# Patient Record
Sex: FEMALE | Race: WHITE | Employment: FULL TIME | URBAN - METROPOLITAN AREA
[De-identification: names, ages, dates, MRNs, and addresses within clinical notes are randomized per-mention and may not be internally consistent; named-entity substitution may affect disease eponyms.]

---

## 2019-12-16 LAB
ANTIBODY SCREEN, EXTERNAL: NEGATIVE
CHLAMYDIA, EXTERNAL: NEGATIVE
HBSAG, EXTERNAL: NEGATIVE
HIV, EXTERNAL: NEGATIVE
N. GONORRHEA, EXTERNAL: NEGATIVE
RPR, EXTERNAL: NORMAL
RUBELLA, EXTERNAL: NORMAL
TYPE, ABO & RH, EXTERNAL: NORMAL

## 2020-06-29 LAB — GRBS, EXTERNAL: NEGATIVE

## 2020-07-07 ENCOUNTER — HOSPITAL ENCOUNTER (OUTPATIENT)
Age: 25
Discharge: HOME | End: 2020-07-07
Attending: OBSTETRICS & GYNECOLOGY | Admitting: OBSTETRICS & GYNECOLOGY
Payer: COMMERCIAL

## 2020-07-07 VITALS
WEIGHT: 203 LBS | TEMPERATURE: 97.6 F | DIASTOLIC BLOOD PRESSURE: 68 MMHG | RESPIRATION RATE: 16 BRPM | HEIGHT: 67 IN | HEART RATE: 73 BPM | SYSTOLIC BLOOD PRESSURE: 114 MMHG | BODY MASS INDEX: 31.86 KG/M2

## 2020-07-07 PROBLEM — Z33.1 IUP (INTRAUTERINE PREGNANCY), INCIDENTAL: Status: ACTIVE | Noted: 2020-07-07
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
## 2020-07-07 PROBLEM — Z3A.37 37 WEEKS GESTATION OF PREGNANCY: Status: ACTIVE | Noted: 2020-07-07

## 2020-07-07 PROCEDURE — 99282 EMERGENCY DEPT VISIT SF MDM: CPT

## 2020-07-07 PROCEDURE — 74011250637 HC RX REV CODE- 250/637: Performed by: ADVANCED PRACTICE MIDWIFE

## 2020-07-07 PROCEDURE — 59025 FETAL NON-STRESS TEST: CPT

## 2020-07-07 RX ORDER — ACETAMINOPHEN 500 MG
1000 TABLET ORAL ONCE
Status: COMPLETED | OUTPATIENT
Start: 2020-07-07 | End: 2020-07-07

## 2020-07-07 RX ORDER — FAMOTIDINE 20 MG/1
20 TABLET, FILM COATED ORAL 2 TIMES DAILY
COMMUNITY
End: 2020-07-17

## 2020-07-07 RX ORDER — CALCIUM CARBONATE/VITAMIN D3 600 MG-125
TABLET ORAL
COMMUNITY
End: 2021-05-11 | Stop reason: HOSPADM

## 2020-07-07 RX ORDER — GLUCOSAMINE SULFATE 1500 MG
POWDER IN PACKET (EA) ORAL DAILY
COMMUNITY
End: 2021-05-11 | Stop reason: HOSPADM

## 2020-07-07 RX ORDER — ONDANSETRON 4 MG/1
4 TABLET, FILM COATED ORAL
COMMUNITY
End: 2020-07-17

## 2020-07-07 RX ADMIN — ACETAMINOPHEN 1000 MG: 500 TABLET ORAL at 16:08

## 2020-07-07 NOTE — PROGRESS NOTES
presents at 37. 3 weeks gestation due to a fall that occurred at 1400 20 while entering her home. EFM applied. Hx confirmed. Pt oriented to room.

## 2020-07-07 NOTE — PROGRESS NOTES
Discharge instructions given. Pt gives verbal teach back of all instructions. Pt denies further needs at this time. Pt ambulated off the unit at this time in stable condition undelivered to follow up as scheduled.

## 2020-07-07 NOTE — PROGRESS NOTES
FHR tracing for the past 3 hours reviewed with REHAN Naranjo CNM. Verbal oreders received for discharge at this time. No further orders received at this time.

## 2020-07-07 NOTE — PROGRESS NOTES
Kimmy Corona CNM on the phone and updated regarding pt's arrival and fall. Telephone orders received for 1000mg PO 1 time dose of Tylenol and continuous EFM until 1800 today. No further orders received at this time.

## 2020-07-13 ENCOUNTER — HOSPITAL ENCOUNTER (OUTPATIENT)
Age: 25
Discharge: HOME | End: 2020-07-13
Attending: OBSTETRICS & GYNECOLOGY | Admitting: OBSTETRICS & GYNECOLOGY
Payer: COMMERCIAL

## 2020-07-13 VITALS — HEIGHT: 67 IN | BODY MASS INDEX: 45.99 KG/M2 | WEIGHT: 293 LBS

## 2020-07-13 LAB
APPEARANCE UR: CLEAR
BILIRUB UR QL: NEGATIVE
COLOR UR: ABNORMAL
GLUCOSE UR QL STRIP.AUTO: NEGATIVE MG/DL
KETONES UR-MCNC: NEGATIVE MG/DL
LEUKOCYTE ESTERASE UR QL STRIP: NEGATIVE
NITRITE UR QL: NEGATIVE
PH UR: 7 [PH] (ref 5–9)
PROT UR QL: NEGATIVE MG/DL
RBC # UR STRIP: ABNORMAL /UL
SERVICE CMNT-IMP: ABNORMAL
SP GR UR: 1.02 (ref 1–1.02)
UROBILINOGEN UR QL: 0.2 EU/DL (ref 0.2–1)

## 2020-07-13 PROCEDURE — 81003 URINALYSIS AUTO W/O SCOPE: CPT

## 2020-07-13 PROCEDURE — 59025 FETAL NON-STRESS TEST: CPT

## 2020-07-13 PROCEDURE — 99283 EMERGENCY DEPT VISIT LOW MDM: CPT

## 2020-07-13 RX ORDER — CALCIUM CARBONATE 200(500)MG
1 TABLET,CHEWABLE ORAL DAILY
COMMUNITY
End: 2021-05-11 | Stop reason: HOSPADM

## 2020-07-13 RX ORDER — ACETAMINOPHEN 500 MG
1000 TABLET ORAL
COMMUNITY

## 2020-07-14 NOTE — PROGRESS NOTES
HPI:    Subjective:     Tyler Gonzales, a  30 y.o.   at 38w2d presents to L&D with c/o Uterine contractions: seen in office today with v/e 2 cm, ctx every 2-3 minutes since office visit. Denies VB, LOF, reports good FM    Assessment:  Past Medical History:   Diagnosis Date    Heart abnormality     Heart ablasion     Past Surgical History:   Procedure Laterality Date    HX OTHER SURGICAL      cyst removal L eye     HX SVT ABLATION N/A 2019    Cardiac Ablation due to PVC's. Allergies   Allergen Reactions    Amitriptyline Swelling     Prior to Admission medications    Medication Sig Start Date End Date Taking? Authorizing Provider   calcium carbonate (TUMS) 200 mg calcium (500 mg) chew Take 1 Tab by mouth daily. Yes Provider, Historical   acetaminophen (Tylenol Extra Strength) 500 mg tablet Take 1,000 mg by mouth every six (6) hours as needed for Pain. Indications: backache   Yes Provider, Historical   PNV No12-Iron-FA-DSS-OM-3 29 mg iron-1 mg -50 mg CPKD Take  by mouth. Yes Provider, Historical   calcium-cholecalciferol, d3, (CALCIUM 600 + D) 600-125 mg-unit tab Take  by mouth. Yes Provider, Historical   cholecalciferol (Vitamin D3) 25 mcg (1,000 unit) cap Take  by mouth daily. Yes Provider, Historical   ondansetron hcl (Zofran) 4 mg tablet Take 4 mg by mouth every eight (8) hours as needed for Nausea or Vomiting. Yes Provider, Historical   famotidine (Pepcid) 20 mg tablet Take 20 mg by mouth two (2) times a day. Yes Provider, Historical        Objective:     Vitals:  Vitals:    20   Weight: (!) 199 kg (438 lb 11.5 oz)   Height: 5' 7\" (1.702 m)        Physical Exam:  Patient without distress.   Abdomen: soft, nontender  Fundus: soft and non tender  Perineum: blood absent, amniotic fluid absent  Cervical Exam: 3 cm dilated    80% effaced    -3 station    Presenting Part: cephalic  Cervical Position: mid position  Consistency: Soft  Membranes:  Intact  Fetal Heart Rate: Reactive  Baseline: 145 per minute  Variability: moderate  Accelerations: yes  Decelerations: none  Uterine contractions: regular, every 1.5-3 minutes    U/A: Urine dipstick shows positive for red blood cells, S.G. 1.020. Assessment/Plan: no e/o active labor, reassuring fetal status     Plan: DC home with standard & ER labor precautions. Follow-up in office for routine visit.      Signed By:  Lenny Covington CNM     July 13, 2020

## 2020-07-14 NOTE — PROGRESS NOTES
2000 Received to L & D unit with c/o ctx every 3-4 minutes and spotting since appointment in office today. 38+2 G1 [facility] patient. 2040 BEBA Hadley informed of arrival, SVE and UA results. Orders received to  ambulate and recheck cervix in about 1 hour. 2045 Up to ambulate in triage. Birthing ball given. 2147 Placed back in EF states pain level has increased to 3-4/10.     2255 No change in SVE. Desires to be DC'd to home since plan is for a un medicated birth. Orders from BEBA Milligan Received. 2210  Written and verbal DC instructions given to include daily fetal kick counts, labor precautions. Verbalizes understanding. All questions answered. Up to get dressed. 26 DC'd to home ambulatory with .

## 2020-07-14 NOTE — DISCHARGE INSTRUCTIONS
Patient Education        Michelle Guerreros Contractions: Care Instructions  Your Care Instructions     Zhang Maddox contractions prepare your uterus for labor. Think of them as a \"warm-up\" exercise that your body does. You may begin to feel them between the 28th and 30th weeks of your pregnancy. But they start as early as the 20th week. Elma Maddox contractions usually occur more often during the ninth month. They may go away when you are active and return when you rest. These contractions are like mild contractions of true labor, but they occur less often. (You feel fewer than 8 in an hour.) They don't cause your cervix to open. It may be hard for you to tell the difference between Michelle More contractions and true labor, especially in your first pregnancy. Follow-up care is a key part of your treatment and safety. Be sure to make and go to all appointments, and call your doctor if you are having problems. It's also a good idea to know your test results and keep a list of the medicines you take. How can you care for yourself at home? · Try a warm bath to help relieve muscle tension and reduce pain. · Change positions every 30 minutes. Take breaks if you must sit for a long time. Get up and walk around. · Drink plenty of water, enough so that your urine is light yellow or clear like water. · Taking short walks may help you feel better. Your doctor needs to check any contractions that are getting stronger or closer together. Where can you learn more? Go to http://declan-ervin.info/  Enter Z402 in the search box to learn more about \"Elma Maddox Contractions: Care Instructions. \"  Current as of: February 11, 2020               Content Version: 12.5  © 5454-5440 [employer]. Care instructions adapted under license by [employer] (which disclaims liability or warranty for this information).  If you have questions about a medical condition or this instruction, always ask your healthcare professional. [address] any warranty or liability for your use of this information. Counting Your Baby's Kicks: Care Instructions  Your Care Instructions     Counting your baby's kicks is one way your doctor can tell that your baby is healthy. Most women--especially in a first pregnancy--feel their baby move for the first time between 16 and 22 weeks. The movement may feel like flutters rather than kicks. Your baby may move more at certain times of the day. When you are active, you may notice less kicking than when you are resting. At your prenatal visits, your doctor will ask whether the baby is active. In your last trimester, your doctor may ask you to count the number of times you feel your baby move. Follow-up care is a key part of your treatment and safety. Be sure to make and go to all appointments, and call your doctor if you are having problems. It's also a good idea to know your test results and keep a list of the medicines you take. How do you count fetal kicks? · A common method of checking your baby's movement is to count the number of kicks or moves you feel in 1 hour. Ten movements (such as kicks, flutters, or rolls) in 1 hour are normal. Some doctors suggest that you count in the morning until you get to 10 movements. Then you can quit for that day and start again the next day. · Pick your baby's most active time of day to count. This may be any time from morning to evening. · If you do not feel 10 movements in an hour, your baby may be sleeping. Wait for the next hour and count again. When should you call for help? Call your doctor now or seek immediate medical care if:  · You noticed that your baby has stopped moving or is moving much less than normal.  Watch closely for changes in your health, and be sure to contact your doctor if you have any problems. Where can you learn more?   Go to http://declan-ervin.info/  Enter [id number] in the search box to learn more about \"Counting Your Baby's Kicks: Care Instructions. \"  Current as of: February 11, 2020               Content Version: 12.5  © 9373-2512 [employer]. Care instructions adapted under license by [employer] (which disclaims liability or warranty for this information). If you have questions about a medical condition or this instruction, always ask your healthcare professional. [place] [address] any warranty or liability for your use of this information.

## 2020-07-15 ENCOUNTER — ANESTHESIA (OUTPATIENT)
Dept: LABOR AND DELIVERY | Age: 25
End: 2020-07-15
Payer: COMMERCIAL

## 2020-07-15 ENCOUNTER — ANESTHESIA EVENT (OUTPATIENT)
Dept: LABOR AND DELIVERY | Age: 25
End: 2020-07-15
Payer: COMMERCIAL

## 2020-07-15 ENCOUNTER — HOSPITAL ENCOUNTER (INPATIENT)
Age: 25
LOS: 2 days | Discharge: HOME | End: 2020-07-17
Attending: OBSTETRICS & GYNECOLOGY | Admitting: OBSTETRICS & GYNECOLOGY
Payer: COMMERCIAL

## 2020-07-15 DIAGNOSIS — R10.31 RIGHT INGUINAL PAIN: Primary | ICD-10-CM

## 2020-07-15 LAB
ABO + RH BLD: NORMAL
BASOPHILS # BLD: 0 K/UL (ref 0–0.1)
BASOPHILS NFR BLD: 0 % (ref 0–2)
BLOOD GROUP ANTIBODIES SERPL: NORMAL
DIFFERENTIAL METHOD BLD: ABNORMAL
EOSINOPHIL # BLD: 0.1 K/UL (ref 0–0.4)
EOSINOPHIL NFR BLD: 1 % (ref 0–5)
ERYTHROCYTE [DISTWIDTH] IN BLOOD BY AUTOMATED COUNT: 13.1 % (ref 11.6–14.5)
HCT VFR BLD AUTO: 37.3 % (ref 35–45)
HGB BLD-MCNC: 12.3 G/DL (ref 12–16)
LYMPHOCYTES # BLD: 2.4 K/UL (ref 0.9–3.6)
LYMPHOCYTES NFR BLD: 23 % (ref 21–52)
MCH RBC QN AUTO: 31.7 PG (ref 24–34)
MCHC RBC AUTO-ENTMCNC: 33 G/DL (ref 31–37)
MCV RBC AUTO: 96.1 FL (ref 74–97)
MONOCYTES # BLD: 1 K/UL (ref 0.05–1.2)
MONOCYTES NFR BLD: 9 % (ref 3–10)
NEUTS SEG # BLD: 7.1 K/UL (ref 1.8–8)
NEUTS SEG NFR BLD: 67 % (ref 40–73)
PLATELET # BLD AUTO: 146 K/UL (ref 135–420)
PMV BLD AUTO: 11.6 FL (ref 9.2–11.8)
RBC # BLD AUTO: 3.88 M/UL (ref 4.2–5.3)
SPECIMEN EXP DATE BLD: NORMAL
WBC # BLD AUTO: 10.5 K/UL (ref 4.6–13.2)

## 2020-07-15 PROCEDURE — 99281 EMR DPT VST MAYX REQ PHY/QHP: CPT

## 2020-07-15 PROCEDURE — 74011000250 HC RX REV CODE- 250: Performed by: SPECIALIST

## 2020-07-15 PROCEDURE — 0HQ9XZZ REPAIR PERINEUM SKIN, EXTERNAL APPROACH: ICD-10-PCS | Performed by: OBSTETRICS & GYNECOLOGY

## 2020-07-15 PROCEDURE — 4A1HXCZ MONITORING OF PRODUCTS OF CONCEPTION, CARDIAC RATE, EXTERNAL APPROACH: ICD-10-PCS | Performed by: OBSTETRICS & GYNECOLOGY

## 2020-07-15 PROCEDURE — 65270000029 HC RM PRIVATE

## 2020-07-15 PROCEDURE — 74011000250 HC RX REV CODE- 250: Performed by: OBSTETRICS & GYNECOLOGY

## 2020-07-15 PROCEDURE — 74011250637 HC RX REV CODE- 250/637: Performed by: MIDWIFE

## 2020-07-15 PROCEDURE — 75410000002 HC LABOR FEE PER 1 HR

## 2020-07-15 PROCEDURE — 77030007879 HC KT SPN EPDRL TELE -B: Performed by: SPECIALIST

## 2020-07-15 PROCEDURE — 86901 BLOOD TYPING SEROLOGIC RH(D): CPT

## 2020-07-15 PROCEDURE — 85025 COMPLETE CBC W/AUTO DIFF WBC: CPT

## 2020-07-15 PROCEDURE — 76060000078 HC EPIDURAL ANESTHESIA

## 2020-07-15 PROCEDURE — 75410000000 HC DELIVERY VAGINAL/SINGLE

## 2020-07-15 PROCEDURE — 74011250636 HC RX REV CODE- 250/636: Performed by: SPECIALIST

## 2020-07-15 PROCEDURE — 00HU33Z INSERTION OF INFUSION DEVICE INTO SPINAL CANAL, PERCUTANEOUS APPROACH: ICD-10-PCS | Performed by: SPECIALIST

## 2020-07-15 PROCEDURE — 74011250636 HC RX REV CODE- 250/636: Performed by: OBSTETRICS & GYNECOLOGY

## 2020-07-15 PROCEDURE — 75410000003 HC RECOV DEL/VAG/CSECN EA 0.5 HR

## 2020-07-15 PROCEDURE — 74011250637 HC RX REV CODE- 250/637: Performed by: OBSTETRICS & GYNECOLOGY

## 2020-07-15 PROCEDURE — 59025 FETAL NON-STRESS TEST: CPT

## 2020-07-15 PROCEDURE — 77030040830 HC CATH URETH FOL MDII -A

## 2020-07-15 RX ORDER — NALBUPHINE HYDROCHLORIDE 10 MG/ML
2.5 INJECTION, SOLUTION INTRAMUSCULAR; INTRAVENOUS; SUBCUTANEOUS
Status: DISCONTINUED | OUTPATIENT
Start: 2020-07-15 | End: 2020-07-15 | Stop reason: HOSPADM

## 2020-07-15 RX ORDER — NALOXONE HYDROCHLORIDE 0.4 MG/ML
0.2 INJECTION, SOLUTION INTRAMUSCULAR; INTRAVENOUS; SUBCUTANEOUS AS NEEDED
Status: DISCONTINUED | OUTPATIENT
Start: 2020-07-15 | End: 2020-07-15 | Stop reason: HOSPADM

## 2020-07-15 RX ORDER — MINERAL OIL
30 OIL (ML) ORAL AS NEEDED
Status: COMPLETED | OUTPATIENT
Start: 2020-07-15 | End: 2020-07-15

## 2020-07-15 RX ORDER — BUTORPHANOL TARTRATE 2 MG/ML
2 INJECTION INTRAMUSCULAR; INTRAVENOUS
Status: DISCONTINUED | OUTPATIENT
Start: 2020-07-15 | End: 2020-07-15 | Stop reason: HOSPADM

## 2020-07-15 RX ORDER — LIDOCAINE HYDROCHLORIDE 10 MG/ML
INJECTION INFILTRATION; PERINEURAL AS NEEDED
Status: DISCONTINUED | OUTPATIENT
Start: 2020-07-15 | End: 2020-07-15 | Stop reason: HOSPADM

## 2020-07-15 RX ORDER — METHYLERGONOVINE MALEATE 0.2 MG/ML
0.2 INJECTION INTRAVENOUS AS NEEDED
Status: DISCONTINUED | OUTPATIENT
Start: 2020-07-15 | End: 2020-07-15 | Stop reason: HOSPADM

## 2020-07-15 RX ORDER — IBUPROFEN 400 MG/1
800 TABLET ORAL
Status: DISCONTINUED | OUTPATIENT
Start: 2020-07-15 | End: 2020-07-17 | Stop reason: HOSPADM

## 2020-07-15 RX ORDER — FAMOTIDINE 20 MG/1
20 TABLET, FILM COATED ORAL EVERY 12 HOURS
Status: DISCONTINUED | OUTPATIENT
Start: 2020-07-15 | End: 2020-07-17 | Stop reason: HOSPADM

## 2020-07-15 RX ORDER — NALBUPHINE HYDROCHLORIDE 10 MG/ML
10 INJECTION, SOLUTION INTRAMUSCULAR; INTRAVENOUS; SUBCUTANEOUS
Status: DISCONTINUED | OUTPATIENT
Start: 2020-07-15 | End: 2020-07-15 | Stop reason: HOSPADM

## 2020-07-15 RX ORDER — ZOLPIDEM TARTRATE 5 MG/1
5 TABLET ORAL
Status: DISCONTINUED | OUTPATIENT
Start: 2020-07-15 | End: 2020-07-17 | Stop reason: HOSPADM

## 2020-07-15 RX ORDER — FENTANYL CITRATE 50 UG/ML
100 INJECTION, SOLUTION INTRAMUSCULAR; INTRAVENOUS ONCE
Status: DISPENSED | OUTPATIENT
Start: 2020-07-15 | End: 2020-07-15

## 2020-07-15 RX ORDER — OXYCODONE AND ACETAMINOPHEN 5; 325 MG/1; MG/1
2 TABLET ORAL
Status: DISCONTINUED | OUTPATIENT
Start: 2020-07-15 | End: 2020-07-17 | Stop reason: HOSPADM

## 2020-07-15 RX ORDER — FENTANYL CITRATE 50 UG/ML
INJECTION, SOLUTION INTRAMUSCULAR; INTRAVENOUS AS NEEDED
Status: DISCONTINUED | OUTPATIENT
Start: 2020-07-15 | End: 2020-07-15 | Stop reason: HOSPADM

## 2020-07-15 RX ORDER — PHENYLEPHRINE HCL IN 0.9% NACL 1 MG/10 ML
80 SYRINGE (ML) INTRAVENOUS AS NEEDED
Status: DISCONTINUED | OUTPATIENT
Start: 2020-07-15 | End: 2020-07-15 | Stop reason: HOSPADM

## 2020-07-15 RX ORDER — TERBUTALINE SULFATE 1 MG/ML
0.25 INJECTION SUBCUTANEOUS
Status: DISCONTINUED | OUTPATIENT
Start: 2020-07-15 | End: 2020-07-15 | Stop reason: HOSPADM

## 2020-07-15 RX ORDER — OXYTOCIN/0.9 % SODIUM CHLORIDE 20/1000 ML
999 PLASTIC BAG, INJECTION (ML) INTRAVENOUS ONCE
Status: COMPLETED | OUTPATIENT
Start: 2020-07-15 | End: 2020-07-15

## 2020-07-15 RX ORDER — SODIUM CHLORIDE, SODIUM LACTATE, POTASSIUM CHLORIDE, CALCIUM CHLORIDE 600; 310; 30; 20 MG/100ML; MG/100ML; MG/100ML; MG/100ML
125 INJECTION, SOLUTION INTRAVENOUS CONTINUOUS
Status: DISCONTINUED | OUTPATIENT
Start: 2020-07-15 | End: 2020-07-15 | Stop reason: HOSPADM

## 2020-07-15 RX ORDER — HYDROMORPHONE HYDROCHLORIDE 1 MG/ML
1 INJECTION, SOLUTION INTRAMUSCULAR; INTRAVENOUS; SUBCUTANEOUS
Status: DISCONTINUED | OUTPATIENT
Start: 2020-07-15 | End: 2020-07-15 | Stop reason: HOSPADM

## 2020-07-15 RX ORDER — AMOXICILLIN 250 MG
1 CAPSULE ORAL
Status: DISCONTINUED | OUTPATIENT
Start: 2020-07-15 | End: 2020-07-17 | Stop reason: HOSPADM

## 2020-07-15 RX ORDER — FENTANYL/ROPIVACAINE/NS/PF 2MCG/ML-.1
1-15 PLASTIC BAG, INJECTION (ML) EPIDURAL
Status: DISCONTINUED | OUTPATIENT
Start: 2020-07-15 | End: 2020-07-15 | Stop reason: HOSPADM

## 2020-07-15 RX ORDER — OXYTOCIN/0.9 % SODIUM CHLORIDE 20/1000 ML
125 PLASTIC BAG, INJECTION (ML) INTRAVENOUS CONTINUOUS
Status: DISCONTINUED | OUTPATIENT
Start: 2020-07-15 | End: 2020-07-15 | Stop reason: HOSPADM

## 2020-07-15 RX ORDER — PROMETHAZINE HYDROCHLORIDE 25 MG/ML
25 INJECTION, SOLUTION INTRAMUSCULAR; INTRAVENOUS
Status: DISCONTINUED | OUTPATIENT
Start: 2020-07-15 | End: 2020-07-17 | Stop reason: HOSPADM

## 2020-07-15 RX ORDER — LIDOCAINE HYDROCHLORIDE AND EPINEPHRINE 15; 5 MG/ML; UG/ML
INJECTION, SOLUTION EPIDURAL AS NEEDED
Status: DISCONTINUED | OUTPATIENT
Start: 2020-07-15 | End: 2020-07-15 | Stop reason: HOSPADM

## 2020-07-15 RX ORDER — DIPHENHYDRAMINE HYDROCHLORIDE 50 MG/ML
25 INJECTION, SOLUTION INTRAMUSCULAR; INTRAVENOUS
Status: DISCONTINUED | OUTPATIENT
Start: 2020-07-15 | End: 2020-07-15 | Stop reason: HOSPADM

## 2020-07-15 RX ORDER — LIDOCAINE HYDROCHLORIDE 10 MG/ML
20 INJECTION, SOLUTION EPIDURAL; INFILTRATION; INTRACAUDAL; PERINEURAL AS NEEDED
Status: COMPLETED | OUTPATIENT
Start: 2020-07-15 | End: 2020-07-15

## 2020-07-15 RX ORDER — ACETAMINOPHEN 325 MG/1
650 TABLET ORAL
Status: DISCONTINUED | OUTPATIENT
Start: 2020-07-15 | End: 2020-07-17 | Stop reason: HOSPADM

## 2020-07-15 RX ADMIN — LIDOCAINE HYDROCHLORIDE,EPINEPHRINE BITARTRATE 4.5 ML: 15; .005 INJECTION, SOLUTION EPIDURAL; INFILTRATION; INTRACAUDAL; PERINEURAL at 04:56

## 2020-07-15 RX ADMIN — ACETAMINOPHEN 650 MG: 325 TABLET ORAL at 20:19

## 2020-07-15 RX ADMIN — FENTANYL CITRATE 100 MCG: 50 INJECTION, SOLUTION INTRAMUSCULAR; INTRAVENOUS at 04:56

## 2020-07-15 RX ADMIN — LIDOCAINE HYDROCHLORIDE 20 ML: 10 INJECTION, SOLUTION EPIDURAL; INFILTRATION; INTRACAUDAL; PERINEURAL at 14:41

## 2020-07-15 RX ADMIN — FAMOTIDINE 20 MG: 20 TABLET, FILM COATED ORAL at 07:47

## 2020-07-15 RX ADMIN — LIDOCAINE HYDROCHLORIDE 6 ML: 10 INJECTION, SOLUTION INFILTRATION; PERINEURAL at 04:48

## 2020-07-15 RX ADMIN — Medication 19980 MILLI-UNITS/HR: at 14:37

## 2020-07-15 RX ADMIN — FAMOTIDINE 20 MG: 20 TABLET, FILM COATED ORAL at 20:19

## 2020-07-15 RX ADMIN — IBUPROFEN 800 MG: 400 TABLET, FILM COATED ORAL at 16:23

## 2020-07-15 RX ADMIN — SODIUM CHLORIDE, SODIUM LACTATE, POTASSIUM CHLORIDE, AND CALCIUM CHLORIDE 125 ML/HR: 600; 310; 30; 20 INJECTION, SOLUTION INTRAVENOUS at 04:53

## 2020-07-15 RX ADMIN — SODIUM CHLORIDE, SODIUM LACTATE, POTASSIUM CHLORIDE, AND CALCIUM CHLORIDE 125 ML/HR: 600; 310; 30; 20 INJECTION, SOLUTION INTRAVENOUS at 08:54

## 2020-07-15 RX ADMIN — MINERAL 30 ML: 999 OIL ORAL at 13:54

## 2020-07-15 RX ADMIN — Medication 15 ML/HR: at 05:00

## 2020-07-15 RX ADMIN — SODIUM CHLORIDE, SODIUM LACTATE, POTASSIUM CHLORIDE, AND CALCIUM CHLORIDE 1000 ML: 600; 310; 30; 20 INJECTION, SOLUTION INTRAVENOUS at 03:00

## 2020-07-15 NOTE — PROGRESS NOTES
0710 Bedside and Verbal shift change report given to [facility], RN (oncoming nurse) by CECILIA Celaya RN (offgoing nurse). Report included the following information SBAR, Kardex, Intake/Output, MAR and Recent Results. Patient resting in bed on right side with peanut ball between her legs. Significant other is at bedside. Call bell is within reach. No further needs at this time. Will continue to monitor. Patients significant other came out to the nurses station and asked if the patient could get something for heartburn/reflux. [facility] with KIRAN Menendez CNM and received order for Pepcid Q12.    0800 Patient stated she is feeling pretty constant pressure. SVE 7-8/100/-1. Assisted patient to left lateral side with right leg in stirrup. EFM and TOCO adjusted. 1005 SVE 9.5/100/0    1008 Assisted patient to left lateral side with right leg in stirrup. 1153 At bedside with CNM. SVE unchanged. 1157 Assisted patient to left sided cowgirl position with peanut ball between legs    1305 CNM at bedside. SVE complete    1309 Pushing started. CNM and RN at bedside continuously monitoring FHT    1325 CNM left room. Pushing continued. RN continuously monitoring FHT    1346 CNM at bedside. Pushing continued. CNM and RN continuously monitoring FHT    329 3803 Joyner removed. 350 mL    1354 Mineral oil applied. 0 CNM and RN at bedside continuously monitoring FHT    1434  of viable male infant. Tight nuchal noted and delivered through. Cord also wrapped around infant's right hand. Infant placed skin to skin on mother's abdomen. Tactile stimulation applied. 1437 Cord clamped and cut. Infant placed skin to skin on mother's chest.    1439  of placenta. 1442 Repair of first degree perineal laceration started. [address] care provided. Ice pack and pad applied. 1610 Epidural catheter removed. Tip intact.     1616 Patient has non-skid socks on, assisted up out of bed and ambulated to bathroom without difficulty. Patient voided urine. Sepideh care and teaching, ice sepideh pad, pad, mesh panties provided. Assisted patient back to bed. 1830 TRANSFER - OUT REPORT:    Verbal report given to MONSE Maza (name) on Tim Weiner  being transferred to mother/baby (unit) for routine progression of care       Report consisted of patients Situation, Background, Assessment and   Recommendations(SBAR). Information from the following report(s) SBAR, Kardex, Intake/Output, MAR and Recent Results was reviewed with the receiving nurse. Lines:   Peripheral IV 07/15/20 Left;Posterior Hand (Active)   Site Assessment Clean, dry, & intact 07/15/20 0712   Phlebitis Assessment 0 07/15/20 0712   Infiltration Assessment 0 07/15/20 0712   Dressing Status Clean, dry, & intact 07/15/20 0712   Dressing Type Tape;Trach dressing 07/15/20 7996   Hub Color/Line Status Pink; Infusing 07/15/20 3831   Action Taken Open ports on tubing capped 07/15/20 0229   Alcohol Cap Used Yes 07/15/20 2932        Opportunity for questions and clarification was provided.       Patient transported with:   Registered Nurse

## 2020-07-15 NOTE — ROUTINE PROCESS
1830 TRANSFER - IN REPORT:    Verbal report received from KIRAN Morrow RN (name) on Zonia Noyola  being received from L&D (unit) for routine progression of care      Report consisted of patients Situation, Background, Assessment and   Recommendations(SBAR). Information from the following report(s) SBAR, Kardex, Procedure Summary, Intake/Output, MAR, Accordion and Recent Results was reviewed with the receiving nurse. Opportunity for questions and clarification was provided. Assessment completed upon patients arrival to unit and care assumed. 1845 Pt ambulating to the bathroom, Voiding. Lulu care, new pad and ice pack provided. 1855 Pt in low fowlers. Pt oriented to the room and call bell. Call bell within reach. 1910 Bedside and Verbal shift change report given to JOSE Connelly RN (oncoming nurse) by Can Schroeder RN (offgoing nurse). Report included the following information SBAR, Kardex, Procedure Summary, Intake/Output, MAR, Accordion and Recent Results.

## 2020-07-15 NOTE — PROGRESS NOTES
Problem: Patient Education: Go to Patient Education Activity  Goal: Patient/Family Education  Outcome: Progressing Towards Goal     Problem: Vaginal Delivery: Day of Deliver-Laboring  Goal: Off Pathway (Use only if patient is Off Pathway)  Outcome: Progressing Towards Goal  Goal: Activity/Safety  Outcome: Progressing Towards Goal  Goal: Consults, if ordered  Outcome: Progressing Towards Goal  Goal: Diagnostic Test/Procedures  Outcome: Progressing Towards Goal  Goal: Nutrition/Diet  Outcome: Progressing Towards Goal  Goal: Discharge Planning  Outcome: Progressing Towards Goal  Goal: Medications  Outcome: Progressing Towards Goal  Goal: Respiratory  Outcome: Progressing Towards Goal  Goal: Treatments/Interventions/Procedures  Outcome: Progressing Towards Goal  Goal: *Vital signs within defined limits  Outcome: Progressing Towards Goal  Goal: *Labs within defined limits  Outcome: Progressing Towards Goal  Goal: *Hemodynamically stable  Outcome: Progressing Towards Goal  Goal: *Optimal pain control at patient's stated goal  Outcome: Progressing Towards Goal     Problem: Pain  Goal: *Control of Pain  Outcome: Progressing Towards Goal  Goal: *PALLIATIVE CARE:  Alleviation of Pain  Outcome: Progressing Towards Goal     Problem: Patient Education: Go to Patient Education Activity  Goal: Patient/Family Education  Outcome: Progressing Towards Goal

## 2020-07-15 NOTE — ANESTHESIA PROCEDURE NOTES
Epidural Block    Start time: 7/15/2020 4:48 AM  End time: 7/15/2020 4:59 AM  Performed by: Pranav Donis MD  Authorized by: Pranav Donis MD     Pre-Procedure  Indication: labor epidural    Preanesthetic Checklist: risks and benefits discussed and timeout performed      Epidural:   Patient position:  Seated  Prep region:  Lumbar  Prep: Chlorhexidine    Location:  L3-4    Needle and Epidural Catheter:   Needle Type:  Tuohy  Needle Gauge:  17 G  Injection Technique:  Loss of resistance using saline  Attempts:  1  Catheter Size:  20 G  Events: no blood with aspiration, no cerebrospinal fluid with aspiration, no paresthesia and negative aspiration test    Test Dose:  Negative    Assessment:   Catheter Secured:  Tegaderm and tape  Insertion:  Uncomplicated  Patient tolerance:  Patient tolerated the procedure well with no immediate complications    Andrei Rios   = [id number]  CSN = [id number]    Epidural Catheter Placement    Risks, benefits, and alternatives discussed including:  A severe headache that can last for days or weeks  Permanent paralysis  Injury to baby  Chronic back pain    Time out performed, correct patient and site identified. Standard monitors applied. 1% lidocaine infiltrated,     Loss of Resistance distinct first pass @ 5.5 cm  NS 10 ml injected prior to catheter placement. Catheter 10.5 cm at skin. Negative test dose through filter. Secured  BP check  Pump Started  Patient tolerated the procedure well, VSS throughout, no apparent complications.       Andrei Rios   = [id number]  CSN = [id number]

## 2020-07-15 NOTE — L&D DELIVERY NOTE
Delivery Summary    Patient: Gayle Aguayo MRN: [id number]  SSN: [id number]    YOB: 1995  Age: 30 y.o.   Sex: female       Information for the patient's :  Hampton Fonder [[id number]]       Labor Events:    Labor: No    Steroids:     Cervical Ripening Date/Time:       Cervical Ripening Type: None   Antibiotics During Labor:     Rupture Identifier:      Rupture Date/Time: 7/15/2020 12:30 AM   Rupture Type: SROM   Amniotic Fluid Volume:      Amniotic Fluid Description: Clear    Amniotic Fluid Odor: None    Induction: None       Induction Date/Time:        Indications for Induction:      Augmentation: None   Augmentation Date/Time:      Indications for Augmentation:     Labor complications: None       Additional complications:        Delivery Events:  Indications For Episiotomy:     Episiotomy: None   Perineal Laceration(s): 1st   Repaired: Yes   Periurethral Laceration Location:      Repaired:     Labial Laceration Location:     Repaired:     Sulcal Laceration Location:     Repaired:     Vaginal Laceration Location:     Repaired:     Cervical Laceration Location:     Repaired:     Repair Suture: Vicryl 3-0   Number of Repair Packets:     Estimated Blood Loss (ml):  ml   Quantitative Blood Loss (ml)                Delivery Date: 7/15/2020    Delivery Time: 2:34 PM  Delivery Type: Vaginal, Spontaneous  Sex:  Male    Gestational Age: 38w3d   Delivery Clinician:  Harpal Limon  Living Status: Living   Delivery Location: L&D            APGARS  One minute Five minutes Ten minutes   Skin color: 1   1        Heart rate: 2   2        Grimace: 2   2        Muscle tone: 2   2        Breathin   2        Totals: 9   9            Presentation: Vertex    Position:   Occiput Anterior  Resuscitation Method:  Tactile Stimulation     Meconium Stained: None      Cord Information: 3 Vessels  Complications: Nuchal Cord With Compressions  Cord around: right upper extremity  Delayed cord clamping? Yes  Cord clamped date/time:   Disposition of Cord Blood:      Blood Gases Sent?: No    Placenta:  Date/Time: 7/15/2020  2:39 PM  Removal: Spontaneous      Appearance: Intact      Measurements:  Birth Weight:        Birth Length:        Head Circumference:        Chest Circumference:       Abdominal Girth: Other Providers:   NEHEMIAS CUEVAS;ALEE SMITH;JACK ESPINOSA;YAHAIRA COSBY, Primary Nurse;Primary [place] Nurse; Anesthesiologist;Midwife           Group B Strep:   Lab Results   Component Value Date/Time    GrJANEtrep, External Negative 2020     Information for the patient's :  Yunior Springerwhitney [[id number]]   No results found for: ABORH, PCTABR, PCTDIG, BILI, ABORHEXT, ABORH     No results for input(s): PCO2CB, PO2CB, HCO3I, SO2I, IBD, PTEMPI, SPECTI, PHICB, ISITE, IDEV, IALLEN in the last 72 hours.          Delivery Note    Obstetrician:  Delaine Soulier, CNM    Assistant: none    Pre-Delivery Diagnosis: Term pregnancy, Spontaneous labor, Single fetus and Uncomplicated pregnancy    Post-Delivery Diagnosis: Living  infant(s) and Male    Intrapartum Event: None    Procedure: Spontaneous vaginal delivery    Epidural: YES    Monitor:  Fetal Heart Tones - External and Uterine Contractions - External    Indications for instrumental delivery: none    Estimated Blood Loss: 300    Episiotomy: n/a    Laceration(s):  1st degree    Laceration(s) repair: YES    Presentation: Cephalic    Fetal Description: granda    Fetal Position: Occiput Anterior    Birth Weight: not yet assessed    Birth Length: not yet asessed    Apgar - One Minute: 9    Apgar - Five Minutes: 9    Umbilical Cord: Nuchal Cord x  1 and right arm, 3 vessels present, Cord blood sent to lab for type, Rh, and Cheyanne' test   Specimens: none  Complications:  none           Cord Blood Results:   Information for the patient's :  Yunior Springerwhitney [[id number]]   No results found for: PCTABR, PCTDIG, BILI, ABORH     Prenatal Labs:     Lab Results   Component Value Date/Time    ABO/Rh(D) O POSITIVE 07/15/2020 01:54 AM    HBsAg, External Negative 12/16/2019    HIV, External Negative 12/16/2019    Rubella, External Immune 12/16/2019    RPR, External Non-reactive 12/16/2019    Gonorrhea, External Negative 12/16/2019    Chlamydia, External Negative 12/16/2019    GrBStrep, External Negative 06/29/2020        Attending Attestation: I was present and scrubbed for the entire procedure

## 2020-07-15 NOTE — ANESTHESIA PREPROCEDURE EVALUATION
Relevant Problems   No relevant active problems       Anesthetic History   No history of anesthetic complications     Pertinent negatives: No PONV       Review of Systems / Medical History  Patient summary reviewed, nursing notes reviewed and pertinent labs reviewed    Pulmonary              Pertinent negatives: No COPD and smoker     Neuro/Psych   Within defined limits           Cardiovascular  Within defined limits          Dysrhythmias ( s/p ablation) : PVC    Pertinent negatives: No hypertension, past MI and CAD       GI/Hepatic/Renal  Within defined limits           Pertinent negatives: No liver disease and renal disease   Endo/Other  Within defined limits        Pertinent negatives: No diabetes   Other Findings              Physical Exam    Airway  Mallampati: II  TM Distance: > 6 cm  Neck ROM: normal range of motion   Mouth opening: Normal     Cardiovascular               Dental         Pulmonary                 Abdominal         Other Findings            Anesthetic Plan    ASA: 2  Anesthesia type: epidural            Anesthetic plan and risks discussed with: Patient      Epidural Risks    Risks, benefits, and alternatives discussed including:  Some work better than others. Some can work for a while then quit. A severe headache that can last for days or weeks  Permanent paralysis  Injury to baby  Chronic back pain    no questions.

## 2020-07-15 NOTE — PROGRESS NOTES
0155-Pt is a 30 y.o.  at 38w4d, arrived to L&D triage with c/o of SROM. EFM and TOCO applied, Nitrazine positive. Pt admitted. 0300-EFM tracing reactive. CNM Brault on site and okay with intermittent monitoring. TOCO and US removed. Pt given birthday ball.     0402-Paged Dr. Rhonda Suazo for epidural placement    0440-Dr Rhonda Suazo at bedside for epidural placement  044-Time out completed at this time  0455-100mcg Fentanyl given to Dr. Rhonda Suazo  0456-Test dose/bolus dose given at this time  0458-epidural complete      -Bedside and verbal shift change report given to KIRAN Castellano, RN (oncoming nurse) by CECILIA Reece RN (offgoing nurse). Report included the following information SBAR, Kardex and MAR.

## 2020-07-15 NOTE — LACTATION NOTE
This note was copied from a baby's chart. 1640 Mom educated on breastfeeding basics--hunger cues, feeding on demand, waking baby if baby sleeps too long between feeds, importance of skin to skin, positioning and latching, risk of pacifier use and supplemental feedings, and importance of rooming in--and use of log sheet. Mom also educated on benefits of breastfeeding for herself and baby. Mom verbalized understanding. No questions at this time. Per mom, infant latching and nursing well for 30 minutes. Will page for next feeding. 200 LC called to room, but mom had already fed for 15 minutes. Encouraged to call for next feeding.  [facility] asked to come to room. Attempted to get  to latch as  was displaying hunger signs.  was not willing to latch at this time. Placed  skin to skin with mom at . Encouraged mom to attempt again in an hour. Mom verbalized understanding.  Taught hand expressing and spoon fed 2/3 spoon of colostrum to .  Parents verbalized understanding of education

## 2020-07-16 LAB
HCT VFR BLD AUTO: 30.7 % (ref 35–45)
HGB BLD-MCNC: 10 G/DL (ref 12–16)

## 2020-07-16 PROCEDURE — 85018 HEMOGLOBIN: CPT

## 2020-07-16 PROCEDURE — 85014 HEMATOCRIT: CPT

## 2020-07-16 PROCEDURE — 36415 COLL VENOUS BLD VENIPUNCTURE: CPT

## 2020-07-16 PROCEDURE — 74011250637 HC RX REV CODE- 250/637: Performed by: MIDWIFE

## 2020-07-16 PROCEDURE — 65270000029 HC RM PRIVATE

## 2020-07-16 RX ORDER — OXYCODONE AND ACETAMINOPHEN 5; 325 MG/1; MG/1
1 TABLET ORAL
Qty: 10 TAB | Refills: 0 | Status: SHIPPED | OUTPATIENT
Start: 2020-07-16 | End: 2020-07-19

## 2020-07-16 RX ORDER — IBUPROFEN 800 MG/1
800 TABLET ORAL
Qty: 40 TAB | Refills: 0 | Status: SHIPPED | OUTPATIENT
Start: 2020-07-16 | End: 2021-05-11 | Stop reason: HOSPADM

## 2020-07-16 RX ADMIN — IBUPROFEN 800 MG: 400 TABLET, FILM COATED ORAL at 00:04

## 2020-07-16 RX ADMIN — OXYCODONE HYDROCHLORIDE AND ACETAMINOPHEN 2 TABLET: 5; 325 TABLET ORAL at 23:46

## 2020-07-16 RX ADMIN — FAMOTIDINE 20 MG: 20 TABLET, FILM COATED ORAL at 08:43

## 2020-07-16 RX ADMIN — FAMOTIDINE 20 MG: 20 TABLET, FILM COATED ORAL at 20:57

## 2020-07-16 RX ADMIN — ACETAMINOPHEN 650 MG: 325 TABLET ORAL at 00:04

## 2020-07-16 RX ADMIN — IBUPROFEN 800 MG: 400 TABLET, FILM COATED ORAL at 12:40

## 2020-07-16 RX ADMIN — IBUPROFEN 800 MG: 400 TABLET, FILM COATED ORAL at 20:57

## 2020-07-16 RX ADMIN — OXYCODONE HYDROCHLORIDE AND ACETAMINOPHEN 2 TABLET: 5; 325 TABLET ORAL at 06:57

## 2020-07-16 NOTE — PROGRESS NOTES
1930-Bedside and Verbal shift change report given to PRASAD Kennedy RN  (oncoming nurse) by CECILIA Campbell LPN (offgoing nurse). Report given with SBAR, Kardex, Intake/Output, MAR and Recent Results. 0715- Bedside and Verbal shift change report given to SRINATH Campbell LPN (oncoming nurse) by Cata Nails RN (offgoing nurse). Report included the following information SBAR, Intake/Output, MAR and Recent Results.

## 2020-07-16 NOTE — PROGRESS NOTES
Bedside and Verbal shift change report given to JOSE Connelly,Rn (oncoming nurse) by MONSE Fernandez RN (offgoing nurse). Report included the following information SBAR, Kardex, Procedure Summary, Intake/Output, MAR and Recent Results. 1945 Assessment completed    0140 Pt assisted to bathroom. Large amount of urine voided. Lulu-care and clean pads provided. 0524 Pt requests this RN to come to bedside. PT states she has pain in pelvic/hips bilateral. Pt has decreased range of motion and asks this RN to turn her on her back. Asked pt to move legs upward and pt states: \"I have not been able to do that since delivery\". KIRAN Menendez CNM called and informed her of symptoms above and requests bedside assessment. Orders received to administer percocet and consult anesthesiology for assessment and might  possibly have to start PT.     0650 Pt assisted to bathroom. Pt takes a long time getting out of bed. Difficulty shifting hips. Lulu-care and clean pads provided. 0720 Bedside and Verbal shift change report given to KIRAN Villareal (oncoming nurse) by Abdifatah Cruz (offgoing nurse). Report included the following information SBAR, Kardex, Procedure Summary, Intake/Output, MAR and Recent Results.

## 2020-07-16 NOTE — PROGRESS NOTES
Problem: Patient Education: Go to Patient Education Activity  Goal: Patient/Family Education  Outcome: Progressing Towards Goal     Problem: Vaginal Delivery: Day of Deliver-Laboring  Goal: Consults, if ordered  Outcome: Progressing Towards Goal     Problem: Lactation Care Plan  Goal: *Infant latching appropriately  Outcome: Progressing Towards Goal  Goal: *Weight loss less than 10% of birth weight  Outcome: Progressing Towards Goal     Problem: Patient Education: Go to Patient Education Activity  Goal: Patient/Family Education  Outcome: Progressing Towards Goal

## 2020-07-16 NOTE — PROGRESS NOTES
Problem: Pain  Goal: *Control of Pain  Outcome: Progressing Towards Goal     Problem: Vaginal Delivery: Day of Deliver-Laboring  Goal: Off Pathway (Use only if patient is Off Pathway)  Outcome: Resolved/Met  Goal: Activity/Safety  Outcome: Resolved/Met  Goal: Consults, if ordered  Outcome: Resolved/Met  Goal: Diagnostic Test/Procedures  Outcome: Resolved/Met  Goal: Nutrition/Diet  Outcome: Resolved/Met  Goal: Discharge Planning  Outcome: Resolved/Met  Goal: Medications  Outcome: Resolved/Met  Goal: Respiratory  Outcome: Resolved/Met  Goal: Treatments/Interventions/Procedures  Outcome: Resolved/Met  Goal: *Vital signs within defined limits  Outcome: Resolved/Met  Goal: *Labs within defined limits  Outcome: Resolved/Met  Goal: *Hemodynamically stable  Outcome: Resolved/Met  Goal: *Optimal pain control at patient's stated goal  Outcome: Resolved/Met     Problem: Vaginal Delivery: Day of Delivery-Post delivery  Goal: Off Pathway (Use only if patient is Off Pathway)  Outcome: Resolved/Met  Goal: Activity/Safety  Outcome: Resolved/Met  Goal: Consults, if ordered  Outcome: Resolved/Met  Goal: Nutrition/Diet  Outcome: Resolved/Met  Goal: Discharge Planning  Outcome: Resolved/Met  Goal: Medications  Outcome: Resolved/Met  Goal: Treatments/Interventions/Procedures  Outcome: Resolved/Met  Goal: *Vital signs within defined limits  Outcome: Resolved/Met  Goal: *Labs within defined limits  Outcome: Resolved/Met  Goal: *Hemodynamically stable  Outcome: Resolved/Met  Goal: *Optimal pain control at patient's stated goal  Outcome: Resolved/Met  Goal: *Participates in infant care  Outcome: Resolved/Met  Goal: *Demonstrates progressive activity  Outcome: Resolved/Met  Goal: *Tolerating diet  Outcome: Resolved/Met

## 2020-07-16 NOTE — PROGRESS NOTES
Assumed care of pt.  0835-assessment completed. Denies needs. 0940-asleep in bed. NAD. 1015-asleep in bed. 1140-resting in bed.    1235-baby out to room. Pt sitting in chair. 1240-pain med given. 1340-pain med effective. Denies any other needs. 1435-sitting in chair. Baby to nsy. Pt denies needs. 1525-baby out to room. Bands verified. Denies needs. 1625-fundus firm, lochia small. Denies needs. 1715-resting in bed. Denies needs. 1755-holding infant. Notified pt pt will be in to see her in am.  Denies needs. 1920-Bedside and Verbal shift change report given to PRASAD Kennedy RN (oncoming nurse) by CECILIA Campbell LPN (offgoing nurse). Report given with SBAR, Kardex, Intake/Output, MAR and Recent Results.

## 2020-07-16 NOTE — PROGRESS NOTES
Progress Note    Patient: Elizabeth Galvez MRN: [id number]     YOB: 1995  Age: 30 y.o. Subjective:     Postpartum Day: 1    The patient is feeling well. Pain is  well controlled with current medications. Baby is feeding via breast without difficulty. Urinary output is adequate. Objective:      Patient Vitals for the past 8 hrs:   BP Temp Pulse Resp SpO2   20 0920 125/60 97.9 °F (36.6 °C) 77 17 98 %     General:    alert, cooperative   Lochia:  appropriate   Uterine Fundus:  Ext:   firm @ U-2  FROM except for right leg/groin. Continues to have increased leg pain in same area that she had it before delivery. Hx of fall where she stretched that area. Also states she felt \"popping\" today when walking to the bathroom. Perineum:  Intact    DVT Evaluation:  No evidence of DVT seen on physical exam.     Lab/Data Review:  Recent Results (from the past 24 hour(s))   HEMOGLOBIN    Collection Time: 20  2:00 AM   Result Value Ref Range    HGB 10.0 (L) 12.0 - 16.0 g/dL   HEMATOCRIT    Collection Time: 20  2:00 AM   Result Value Ref Range    HCT 30.7 (L) 35.0 - 45.0 %     All lab results for the last 24 hours reviewed. Assessment:     Delivery:   Groin pain    Plan:     Doing well postpartum vaginal delivery. Pt reports severe groin pain on right side. States this hurt before delivery but is now having a hard time walking and getting comfortable. Requests to work with PT today. Has brace at home and recommended to use. Also discussed using chiropractor as soon as possible due perhaps needing pelvic realignment. Pt agrees. Will send home with a few Percocet to take to help rest until she can get in. Will call office if she needs referral to outpatient PT or ortho. REquests however to go home this evening if baby can go so she can get more comfortable in her own bed or sofa. Continue current postpartum care. Encouraged hydration, nutrition and ambulation.  DC home this evening if baby cleared by pediatrician. Follow-up in office in 6 weeks. Call prn. Current Discharge Medication List      START taking these medications    Details   ibuprofen (MOTRIN) 800 mg tablet Take 1 Tab by mouth every eight (8) hours as needed for Pain. Qty: 40 Tab, Refills: 0      oxyCODONE-acetaminophen (PERCOCET) 5-325 mg per tablet Take 1 Tab by mouth every six (6) hours as needed for Pain for up to 3 days. Max Daily Amount: 4 Tabs. Indications: groin/leg/hip pain, ongoing since before delivery  Qty: 10 Tab, Refills: 0    Associated Diagnoses: Right inguinal pain         CONTINUE these medications which have NOT CHANGED    Details   calcium carbonate (TUMS) 200 mg calcium (500 mg) chew Take 1 Tab by mouth daily. acetaminophen (Tylenol Extra Strength) 500 mg tablet Take 1,000 mg by mouth every six (6) hours as needed for Pain. Indications: backache      PNV No12-Iron-FA-DSS-OM-3 29 mg iron-1 mg -50 mg CPKD Take  by mouth.      calcium-cholecalciferol, d3, (CALCIUM 600 + D) 600-125 mg-unit tab Take  by mouth. cholecalciferol (Vitamin D3) 25 mcg (1,000 unit) cap Take  by mouth daily. STOP taking these medications       ondansetron hcl (Zofran) 4 mg tablet Comments:   Reason for Stopping:         famotidine (Pepcid) 20 mg tablet Comments:   Reason for Stopping:               Reviewed education: S/sx of DVT, mastitis, and pp depression. Also reviewed reasons to call including DVT, issues with breasts, fever above 101, pp depression, increased bleeding (pad/hr) after rest. Included continuing good hydration at home, diet, rest when baby is sleeping, and pericare.      Signed By: Rakel Gil CNM     July 16, 2020

## 2020-07-16 NOTE — PROGRESS NOTES
Problem: Pain  Goal: *Control of Pain  7/16/2020 0018 by Hubert Seth RN  Outcome: Progressing Towards Goal  7/16/2020 0017 by Hubert Seth RN  Outcome: Progressing Towards Goal     Problem: Patient Education: Go to Patient Education Activity  Goal: Patient/Family Education  7/16/2020 0018 by Hubert Seth RN  Outcome: Progressing Towards Goal  7/16/2020 0017 by Hubert Seth RN  Outcome: Progressing Towards Goal     Problem: Lactation Care Plan  Goal: *Infant latching appropriately  7/16/2020 0018 by Hubert Seth RN  Outcome: Progressing Towards Goal  7/16/2020 0017 by Hubert Seth RN  Outcome: Progressing Towards Goal  Goal: *Weight loss less than 10% of birth weight  7/16/2020 0018 by Hubert Seth RN  Outcome: Progressing Towards Goal  7/16/2020 0017 by Hubert Seth RN  Outcome: Progressing Towards Goal     Problem: Patient Education: Go to Patient Education Activity  Goal: Patient/Family Education  7/16/2020 0018 by Hubert Seth RN  Outcome: Progressing Towards Goal  7/16/2020 0017 by Hubert Seth RN  Outcome: Progressing Towards Goal     Problem: Vaginal Delivery: Day of Delivery-Post delivery  Goal: Off Pathway (Use only if patient is Off Pathway)  Outcome: Progressing Towards Goal  Goal: Activity/Safety  Outcome: Progressing Towards Goal  Goal: Nutrition/Diet  Outcome: Progressing Towards Goal  Goal: Discharge Planning  Outcome: Progressing Towards Goal  Goal: Medications  Outcome: Progressing Towards Goal  Goal: Treatments/Interventions/Procedures  Outcome: Progressing Towards Goal  Goal: *Vital signs within defined limits  Outcome: Progressing Towards Goal  Goal: *Labs within defined limits  Outcome: Progressing Towards Goal  Goal: *Hemodynamically stable  Outcome: Progressing Towards Goal  Goal: *Optimal pain control at patient's stated goal  Outcome: Progressing Towards Goal  Goal: *Participates in infant care  Outcome: Progressing Towards Goal  Goal: *Demonstrates progressive activity  Outcome: Progressing Towards Goal  Goal: *Tolerating diet  Outcome: Progressing Towards Goal

## 2020-07-16 NOTE — PROGRESS NOTES
7/16/2020 PT note: consult received and chart reviewed. Evaluation attempted. Pt received call from physician while interviewing pt. Advised pt spouse PT will f/u at later time to allow pt time for conversation with physician. Nurse Catia Ruiz notified of above. Pt most likely will spend tonight at [facility] as well. Will f/u at later time as pt schedule allows for PT evaluation. Thank you.    Katie Anton, PT

## 2020-07-16 NOTE — LACTATION NOTE
Per mom, was sleepy last night so spoonfed infant colostrum, but was able to nurse this morning. Educated on normal  behaviors and DOL expectations. Will page for feeds. [address] [facility] well at [phone] for 7 solid minutes, but then got hiccups and would not continue nursing. Encouraged to burp infant and if hunger cues are present after hiccups subside, then to latch on same breast. Mom verbalized understanding.

## 2020-07-16 NOTE — DISCHARGE SUMMARY
Discharge Summary     Patient: Elizabeth Galvez MRN: [id number]  SSN: [id number]    YOB: 1995  Age: 30 y.o. Sex: female       Admit Date: 7/15/2020    Discharge Date: 7/16/2020      Admission Diagnoses: IUP (intrauterine pregnancy), incidental [Z33.1]    Discharge Diagnoses:   Problem List as of 7/16/2020 Never Reviewed          Codes Class Noted - Resolved    * (Principal) Vaginal delivery ICD-10-CM: O80  ICD-9-CM: 650  7/16/2020 - Present        Postpartum care following vaginal delivery ICD-10-CM: Z39.2  ICD-9-CM: V24.2  7/16/2020 - Present        Fall ICD-10-CM: W19. Renae Vazquez  ICD-9-CM: E888.9  7/7/2020 - Present        IUP (intrauterine pregnancy), incidental ICD-10-CM: Z33.1  ICD-9-CM: V22.2  7/7/2020 - Present        37 weeks gestation of pregnancy ICD-10-CM: Z3A.37  ICD-9-CM: V22.2  7/7/2020 - Present               Discharge Condition: Good    Hospital Course: uncomplicated    Consults: None    Significant Diagnostic Studies: labs: none    Disposition: home    Discharge Medications:   Current Discharge Medication List      START taking these medications    Details   ibuprofen (MOTRIN) 800 mg tablet Take 1 Tab by mouth every eight (8) hours as needed for Pain. Qty: 40 Tab, Refills: 0      oxyCODONE-acetaminophen (PERCOCET) 5-325 mg per tablet Take 1 Tab by mouth every six (6) hours as needed for Pain for up to 3 days. Max Daily Amount: 4 Tabs. Indications: groin/leg/hip pain, ongoing since before delivery  Qty: 10 Tab, Refills: 0    Associated Diagnoses: Right inguinal pain         CONTINUE these medications which have NOT CHANGED    Details   calcium carbonate (TUMS) 200 mg calcium (500 mg) chew Take 1 Tab by mouth daily. acetaminophen (Tylenol Extra Strength) 500 mg tablet Take 1,000 mg by mouth every six (6) hours as needed for Pain.  Indications: backache      PNV No12-Iron-FA-DSS-OM-3 29 mg iron-1 mg -50 mg CPKD Take  by mouth.      calcium-cholecalciferol, d3, (CALCIUM 600 + D) 600-125 mg-unit tab Take  by mouth. cholecalciferol (Vitamin D3) 25 mcg (1,000 unit) cap Take  by mouth daily. STOP taking these medications       ondansetron hcl (Zofran) 4 mg tablet Comments:   Reason for Stopping:         famotidine (Pepcid) 20 mg tablet Comments:   Reason for Stopping:               Activity: Activity as tolerated and pelvic rest  Diet: Regular Diet  Wound Care: pericare    Follow-up Appointments   Procedures    FOLLOW UP VISIT Appointment in: 6 Weeks     Standing Status:   Standing     Number of Occurrences:   1     Order Specific Question:   Appointment in     Answer:   6 Weeks     Pt states she is having a hard time walking due pre-existing groin/leg pain. Has brace at home. Will put in PT consult for today but wishes to go home-thinks she will be more comfortable. Recommended trying chiropractor. If continues to have issues will call office for referral to PT and ortho. Sending home with a few Percocet to help with pain during the night until she can be seen. Pt understand she will not receive any more after this prescription.      Signed By: Jocelyn Woody CNM     July 16, 2020

## 2020-07-16 NOTE — LACTATION NOTE
This note was copied from a baby's chart. 1625 Attempted to get  latched for several minutes. The  will roll in the bottom lip when latching, causing a shallow latch. Attempted to assist with rolling out the lower lip once latched, but this causes the  to un-latch. Elizabeth and mom both becoming frustrated. Placed  skin to skin at 1640 with mom. Encouraged to call for assistance. 1730 infant latched and nursed well for 25 minutes. Breastfeeding discharge teaching completed to include feeding on demand, foremilk and hindmilk importance, engorgement, mastitis, clogged ducts, pumping, breastmilk storage, and returning to work. Information given about unit and office phone numbers and encouraged mom to reach out if concerns arise, but that  [facility] would be calling her in the next few days to follow up on breastfeeding. Mom verbalized understanding and no questions at this time.

## 2020-07-17 VITALS
SYSTOLIC BLOOD PRESSURE: 117 MMHG | BODY MASS INDEX: 31.23 KG/M2 | TEMPERATURE: 98 F | DIASTOLIC BLOOD PRESSURE: 60 MMHG | OXYGEN SATURATION: 98 % | HEART RATE: 72 BPM | RESPIRATION RATE: 17 BRPM | WEIGHT: 199 LBS | HEIGHT: 67 IN

## 2020-07-17 PROCEDURE — 74011250637 HC RX REV CODE- 250/637: Performed by: MIDWIFE

## 2020-07-17 PROCEDURE — 97161 PT EVAL LOW COMPLEX 20 MIN: CPT

## 2020-07-17 PROCEDURE — 97162 PT EVAL MOD COMPLEX 30 MIN: CPT

## 2020-07-17 PROCEDURE — 97116 GAIT TRAINING THERAPY: CPT

## 2020-07-17 RX ADMIN — IBUPROFEN 800 MG: 400 TABLET, FILM COATED ORAL at 05:04

## 2020-07-17 RX ADMIN — FAMOTIDINE 20 MG: 20 TABLET, FILM COATED ORAL at 08:50

## 2020-07-17 NOTE — LACTATION NOTE
Infant latched and nursing well, but concerned that baby \"ate a lot last night. \" Discussed normal  behaviors for second night and cluster feeding. Breastfeeding discharge teaching completed to include feeding on demand, foremilk and hindmilk importance, engorgement, mastitis, clogged ducts, pumping, breastmilk storage, and returning to work. Information given about unit and office phone numbers and encouraged mom to reach out if concerns arise, but that [facility] would be calling her in the next few days to follow up on breastfeeding. Mom verbalized understanding and no questions at this time.

## 2020-07-17 NOTE — DISCHARGE INSTRUCTIONS
POST DELIVERY DISCHARGE INSTRUCTIONS    Name: Lulu Duarte  YOB: 1995  Primary Diagnosis: Principal Problem:    Vaginal delivery (7/16/2020)    Active Problems:    IUP (intrauterine pregnancy), incidental (7/7/2020)      Postpartum care following vaginal delivery (7/16/2020)        General:     Diet/Diet Restrictions:  Eight 8-ounce glasses of fluid daily (water, juices); avoid excessive caffeine intake. Meals/snacks as desired which are high in fiber and carbohydrates and low in fat and cholesterol. Physical Activity / Restrictions / Safety:     Avoid heavy lifting, no more than the baby alone (not the baby in the car seat). Avoid intercourse until you are seen at your postpartum visit. No douching or tampon use. Check with obstetrician before starting or resuming an exercise program.         Discharge Instructions/Special Treatment/Home Care Needs:     Continue prenatal vitamins. Continue to use squirt bottle with warm water on your perineum after each bathroom use until bleeding stops. Call your doctor for the following:     Fever over 101 degrees by mouth. Vaginal bleeding that soaks two pads per hour for more than one hour. Red streaks or increased swelling of legs, painful red streaks on your breast.  If you feel extremely anxious or overwhelmed. If you have thoughts of harming yourself and/or your baby. Pain Management:     Pain Management:   Take Acetaminophen (Tylenol) or Ibuprofen (Advil, Motrin), as directed for pain. Use a warm Sitz bath 3 times daily to relieve perineal or hemorrhoidal discomfort. For hemorrhoidal discomfort, use Tucks and Anusol cream as needed and directed.     Follow-Up Care:     Appointment with MD:   Follow-up Appointments   Procedures    FOLLOW UP VISIT Appointment in: 6 Weeks     Standing Status:   Standing     Number of Occurrences:   1     Order Specific Question:   Appointment in     Answer:   6 Weeks     Telephone number: [phone]      Signed By: Drew Carrillo CNM                                                                                                      Patient armband removed and given to patient to take home.   Patient was informed of the privacy risks if armband lost or stolen

## 2020-07-17 NOTE — PROGRESS NOTES
Problem: Patient Education: Go to Patient Education Activity  Goal: Patient/Family Education  7/17/2020 1133 by Angelia Vargas LPN  Outcome: Resolved/Met  7/17/2020 1133 by Angelia Vargas LPN  Outcome: Resolved/Met     Problem: Pain  Goal: *Control of Pain  7/17/2020 1133 by Angelia Vargas LPN  Outcome: Resolved/Met  7/17/2020 1133 by Angelia Vargas LPN  Outcome: Resolved/Met  Goal: *PALLIATIVE CARE:  Alleviation of Pain  7/17/2020 1133 by Angelia Vargas LPN  Outcome: Resolved/Met  7/17/2020 1133 by Angelia Vargas LPN  Outcome: Resolved/Met     Problem: Patient Education: Go to Patient Education Activity  Goal: Patient/Family Education  Outcome: Resolved/Met     Problem: Lactation Care Plan  Goal: *Infant latching appropriately  Outcome: Resolved/Met  Goal: *Weight loss less than 10% of birth weight  Outcome: Resolved/Met     Problem: Patient Education: Go to Patient Education Activity  Goal: Patient/Family Education  Outcome: Resolved/Met     Problem: Vaginal Delivery: Postpartum Day 1  Goal: Activity/Safety  Outcome: Resolved/Met  Goal: Consults, if ordered  Outcome: Resolved/Met  Goal: Diagnostic Test/Procedures  Outcome: Resolved/Met  Goal: Nutrition/Diet  Outcome: Resolved/Met  Goal: Discharge Planning  Outcome: Resolved/Met  Goal: Medications  Outcome: Resolved/Met  Goal: Treatments/Interventions/Procedures  Outcome: Resolved/Met  Goal: Psychosocial  Outcome: Resolved/Met  Goal: *Vital signs within defined limits  Outcome: Resolved/Met  Goal: *Labs within defined limits  Outcome: Resolved/Met  Goal: *Hemodynamically stable  Outcome: Resolved/Met  Goal: *Optimal pain control at patient's stated goal  Outcome: Resolved/Met  Goal: *Participates in infant care  Outcome: Resolved/Met  Goal: *Demonstrates progressive activity  Outcome: Resolved/Met  Goal: *Performs self perineal care  Outcome: Resolved/Met  Goal: *Appropriate parent-infant bonding  Outcome: Resolved/Met  Goal: *Tolerating diet  Outcome: Resolved/Met  Goal: *Performs self breast care  Outcome: Resolved/Met     Problem: Falls - Risk of  Goal: *Absence of Falls  Description: Document Geronimo Fall Risk and appropriate interventions in the flowsheet.   Outcome: Resolved/Met     Problem: Patient Education: Go to Patient Education Activity  Goal: Patient/Family Education  Outcome: Resolved/Met     Problem: Vaginal Delivery: Postpartum 2  Goal: Activity/Safety  Outcome: Resolved/Met  Goal: Consults, if ordered  Outcome: Resolved/Met  Goal: Nutrition/Diet  Outcome: Resolved/Met  Goal: Discharge Planning  Outcome: Resolved/Met  Goal: Medications  Outcome: Resolved/Met  Goal: Treatments/Interventions/Procedures  Outcome: Resolved/Met  Goal: Psychosocial  Outcome: Resolved/Met     Problem: Vaginal Delivery: Discharge Outcomes  Goal: *Verbalizes name, dosage, time, side effects, and number of days to continue medications  Outcome: Resolved/Met  Goal: *Describes available resources and support systems  Outcome: Resolved/Met  Goal: *No signs and symptoms of infection  Outcome: Resolved/Met  Goal: *Birth certificate information completed  Outcome: Resolved/Met  Goal: *Received and verbalizes understanding of discharge plan and instructions  Outcome: Resolved/Met  Goal: *Vital signs within defined limits  Outcome: Resolved/Met  Goal: *Labs within defined limits  Outcome: Resolved/Met  Goal: *Hemodynamically stable  Outcome: Resolved/Met  Goal: *Optimal pain control at patient's stated goal  Outcome: Resolved/Met  Goal: *Participates in infant care  Outcome: Resolved/Met  Goal: *Demonstrates progressive activity  Outcome: Resolved/Met  Goal: *Appropriate parent-infant bonding  Outcome: Resolved/Met  Goal: *Tolerating diet  Outcome: Resolved/Met

## 2020-07-17 NOTE — PROGRESS NOTES
Problem: Mobility Impaired (Adult and Pediatric)  Goal: *Acute Goals and Plan of Care (Insert Text)  Description: Physical Therapy Goals   Initiated 2020 and to be accomplished within 1 day(s)  1. Patient will ambulate 100 feet with LRAD/S for improved functional mobility/safe discharge. Outcome: Resolved/Met  PHYSICAL THERAPY EVALUATION & DISCHARGE    Patient: Alonso Ortiz (38 y.o. female)  Date: 2020  Primary Diagnosis: IUP (intrauterine pregnancy), incidental [Z33.1]  Precautions:Fall    ASSESSMENT AND RECOMMENDATIONS:  Based on the objective data described below, the patient is a 29 yo F post partum 1 day with c/o bilat groin pain since trip and near fall ~ 1 wk ago. Pt with increased difficulty ambulating since delivery (epidural utilized), though reports significant improvement today, noting ability to ambulate without assist today, whereas yesterday  Needed assist of spouse to walk. Pt found seated EOB. Reports pain 1-2/10 at rest; increases to 6/10 with in/OOB. Pt with decreased AROM hip flexion/abduction/adduction, limited by pain. PROM grossly decreased but functional. Pt completes STS with mod I. Able to participate with GT in pearl with S/mod I. Gait antalgic, with slow, reciprocal angelo and lateral trunk sway. Returned to bed and to supine with min/mod A to LE's primarily d/t decreased proximal pelvic girdle/hip strength and discomfort. Pain most likely related to proximal LE soft tissue injury during near fall, in addition to probable jt laxity d/t hormonal changes r/t pregnancy. Pt educated in gentle HEP as noted below and reports she has appt with  Chiropractor specializing in post partum issues on next Tuesday. Advised to avoid carrying  at this time while gait slow/antalgic with gt deficit as noted below for safety reasons. Pt expressed understanding. No further IP PT indicated at this time. Nurse Catia Ruiz notified.       Pt Education: Role of physical therapy in acute care setting, fall prevention and safety/technique during functional mobility tasks   Discharge Recommendations: pt has appointment with Chiropractor specializing in post partum issues on next Tuesday  Further Equipment Recommendations for Discharge: N/A      SUBJECTIVE:   Patient stated I'm doing much better today.     OBJECTIVE DATA SUMMARY:     Past Medical History:   Diagnosis Date    Heart abnormality 2019    Heart ablasion     Past Surgical History:   Procedure Laterality Date    HX OTHER SURGICAL      cyst removal L eye 1996    HX SVT ABLATION N/A 2019    Cardiac Ablation due to PVC's. Barriers to Learning/Limitations: None  Compensate with: visual, verbal, tactile, kinesthetic cues/model  Prior Level of Function/Home Situation: amb slowly with discomfort since trip and near fall ~ 1 wk ago  [address]: Private residence  # Steps to Enter: 3  One/Two Story Residence: Two story(will be downstairs for next few days)  Living Alone: No  Support Systems: Spouse/Significant Other/Partner  Patient Expects to be Discharged to[de-identified] Private residence  Current DME Used/Available at Home: None  Critical Behavior:  Neurologic State: Alert; Appropriate for age  Orientation Level: Oriented X4  Cognition: Follows commands; Appropriate safety awareness; Appropriate for age attention/concentration; Appropriate decision making  Safety/Judgement: Awareness of environment; Fall prevention  Psychosocial  Patient Behaviors: Calm; Cooperative  Family  Behaviors: Calm;Supportive  Purposeful Interaction: Yes  Caritas Process: Nurture loving kindness;Establish trust;Teaching/learning  Caring Interventions: Reassure  Reassure: Informing; Acceptance  Family  Behaviors: Calm;Supportive  Strength:    Strength: Generally decreased, functional(LE's)  Tone & Sensation:   Tone: Normal  Sensation: Intact  Range Of Motion:  AROM: Generally decreased, functional(LE's )  PROM: Generally decreased, functional(LE's)  Functional Mobility:  Bed Mobility:  Sit to Supine: Minimum assistance(LE's)  Transfers:  Sit to Stand: Modified independent  Stand to Sit: Modified independent  Balance:   Sitting: Intact  Standing: Intact; Without support  Standing - Static: Good  Standing - Dynamic : Good(/good -)  Ambulation/Gait Training:  Distance (ft): 100 Feet (ft)  Assistive Device: Gait belt  Ambulation - Level of Assistance: Supervision;Modified independent  Gait Description (WDL): Exceptions to WDL  Gait Abnormalities: Antalgic;Decreased step clearance;Trunk sway increased(lateral sway L/R)  Base of Support: Widened  Speed/Lluvia: Pace decreased (<100 feet/min)  Step Length: Right shortened;Left shortened  Swing Pattern: Right asymmetrical;Left asymmetrical  Interventions: Safety awareness training  Therapeutic Exercises:   Pt educated in AP (frequently performed) and gentle HS 5-10 rep 2-3x/day as tolerated and performed same accuraely  Pain:  Pain Scale 1: Numeric (0 - 10)  Pain Intensity 1: 1(1-2/10 at rest; increases to 6/10 wiht bed mobility/gt)  Pain Location 1: Groin  Pain Orientation 1: Right;Left  Pain Description 1: Aching  Activity Tolerance:   Fair   Please refer to the flowsheet for vital signs taken during this treatment. After treatment:   []         Patient left in no apparent distress sitting up in chair  [x]         Patient left in no apparent distress in bed  [x]         Call bell left within reach  [x]         Nursing notified-Neris  []         Caregiver present  []         Bed alarm activated    COMMUNICATION/EDUCATION:   [x]         Fall prevention education was provided and the patient/caregiver indicated understanding. []         Patient/family have participated as able in goal setting and plan of care. []         Patient/family agree to work toward stated goals and plan of care. []         Patient understands intent and goals of therapy, but is neutral about his/her participation.   []         Patient is unable to participate in goal setting and plan of care.     Eval Complexity: History: MEDIUM  Complexity : 1-2 comorbidities / personal factors will impact the outcome/ POC Exam:MEDIUM Complexity : 3 Standardized tests and measures addressing body structure, function, activity limitation and / or participation in recreation  Presentation: MEDIUM Complexity : Evolving with changing characteristics  Clinical Decision Making:Medium Complexity    Overall Complexity:MEDIUM    Thank you for this referral.  Turner Barahona, PT   Time Calculation: 25 mins

## 2020-10-20 ENCOUNTER — HOSPITAL ENCOUNTER (OUTPATIENT)
Dept: PHYSICAL THERAPY | Age: 25
Discharge: HOME | End: 2020-10-20
Payer: COMMERCIAL

## 2020-10-20 PROCEDURE — 97162 PT EVAL MOD COMPLEX 30 MIN: CPT

## 2020-10-20 PROCEDURE — 97112 NEUROMUSCULAR REEDUCATION: CPT

## 2020-10-20 PROCEDURE — 97110 THERAPEUTIC EXERCISES: CPT

## 2020-10-20 NOTE — PROGRESS NOTES
Physical Therapy Evaluation        Patient Name: Vielka Gonzalez  Date:10/20/2020  : 1995  [x]  Patient  Verified  Payor: Thierno Tao / Plan: Declan Cleveland / Product Type: HMO /    In time:930  Out time:101  Total Treatment Time (min): 45  Visit #: 1 of 16    Medicare/BCBS Only   Total Timed Codes (min):  25 1:1 Treatment Time:  45       Treatment Area: Pelvic pain [R10.2]    SUBJECTIVE  Pain Level (0-10 scale): 4  []constant []intermittent []improving []worsening []no change since onset    Any medication changes, allergies to medications, adverse drug reactions, diagnosis change, or new procedure performed?: [x] No    [] Yes (see summary sheet for update)  Subjective functional status/changes:     PLOF: prior to pregnancy, no pelvic pain, functional with ADLs and IADLs   Limitations to PLOF: Decreased strength, coordination and endurance  Mechanism of Injury: insidious onset pregnancy and worsened/changed since delivery   Current symptoms/Complaints: Patient states she started having pelvic pain during recent pregnancy for no apparent reason; worsening after a \"twist and fall\" just before delivery.   States pain following delivery, noted worsening pubic symphysis pain, somewhat resolving with chirpractic care but still painful with sleeping on side and walking   Previous Treatment/Compliance: support belt in pregnancy no resolve; chiropractic care, minimal resolution; still breastfeeding  PMHx/Surgical Hx: July 15, 2020 vaginal delivery; tearing no known grade \"minor\"  Work Hx: mental health therapist light duty, just returned to work  Living Situation: spouse and child  Pt Goals: \" eliminate pain and be able to work out again\"   Barriers: []pain []financial [x]time []transportation []other  Motivation: high  Substance use: [x]Alcohol []Tobacco []other:   Cognition: A & O x 4    Other:    Current urinary complaint  None, normal urinary function and void intervals    Patient has failed previous pelvic floor muscle training? [] Yes    [x] No    Bowel function:  Regular BM, 5-7 per week  Stool Type ([place]): 4  Toileting position/modifications: standard, no issues with BM    Pain complaint:  suprapubic pain/discomfort  lower abdomen  sporadic vaginal/rectal with walking \"shooting\"    Pain scale:  Verbal Analog scale: average daily pain 4, best day 2, worst pain 6    Pain description:  daily: constant  worsens with: increased activity/movement and walking; sitting with bend/twist; transfers, stairs  improves with: rest and chiropractic    Diet:well balanced diet    Fluid intake:    Fluid  Amount per day   Water 60 oz   Caffeine    Alcohol occassional             Physical Exam Objective Findings:  Other Tests / Comments:   supine BERNARDINO with head raise at umbilicus just over 2 fingers; less than 2 fingers 1\" above/below umbilicus    Pelvic Floor Assessment  Patient was educated on pelvic floor anatomy, structure, and function and implications for current presentation of s/s. Patient consents to pelvic floor assessment.     Pelvic girdle alignment:  level pelvis, no SI joint tenderness, no dysfunction or obliquity       Postural assessment:  neutral sitting and standing posture- good alignment    External trigger point/ muscle tenderness:  bilateral adductors  Superficial PFM tenderness/restriction   Right Left   Bulbocavernosus (bulbospongiosus) mod min   Ischiocavernosus none none   Superficial Transverse Perineal min mod   Perineal body none na   Levator Ani no no              PFM Screen:    Skin Integrity:  [x] Healthy [] Red  [] Labia Atrophy [] Fragile    Sensation: [x] Intact [] Diminished:    Muscle Bulk: [x] Symmetrical  [] Well-developed [] Atrophied:  []L   []R   []B    Ability to actively bulge: min  Bulge with cough min; bulge min with knack prior to cough    Pelvic floor manual exam: Performed via internal vaginal assessment  female-upon vaginal palpation of the pelvic floor, the following was noted: moderate hypertonicity throughout with painful palpation but tolerated  Introitus restriction/TTP (reported as hands on a clock): 10-11      Deep PFM Tenderness/Restriction:    Right Left   pubococcygeus min no   Ischiococcygeus min min   Iliococcygeus min min   Obturator Internus mod mod   coccyx NT NT            Pelvic floor MMT  3 - good contraction, 3-5 seconds  PERF (Performance/Endurance/Repetitions//Flicks): 8/2/9//5  Good isolation; poor endurance      Pelvic muscle release pattern  1 - poor release, no sensation of release    EMG Evaluation of pelvic floor musculature    Not performed at this visit           20 min [x]Eval                  []Re-Eval       5 min Self Care: Review and handout provided on the following: PFM anatomy, structure and function as it pertains to current s/s, complaints and condition. Reviewed expectations for PFPT and POC. Provided todays therex and PFM HEP   Rationale:  Increase awareness and understanding of current condition to improve patients ability to independently and effectively attain goals and progress towards long term management of current condition.      10 min Therapeutic Exercise:  [] See flow sheet :   Rationale: increase ROM to improve the patients ability to decrease pain with walking and functional activities     10 min Neuromuscular Re-education:  []  See flow sheet : pelvic floor muscles via internal digital facilitation   Rationale: increase strength, improve coordination and increase proprioception  to improve the patients ability to improve pelvic stability            With   [] TE   [] TA   [] neuro   [] other: Patient Education: [x] Review HEP  : seated 3 and PFM 5/5/10, 2/2/10 BID  [] Progressed/Changed HEP based on:   [] positioning   [] body mechanics   [] transfers   [] heat/ice application    [] other:           Pain Level (0-10 scale) post treatment: 2    ASSESSMENT/Changes in Function: Patient is a 30year old female who presents to Physical Therapy with pelvic pain. Patient presents with soft tissue restrictions, scar adhesions, and strength. Patient also presents with tightness and tenderness of the pelvic floor muscles, and decreased ability to contract, relax, and elongate the pelvic floor muscles. I feel patient will benefit from skilled therapeutic intervention to improve their ability to function at highest level possible. Patient will continue to benefit from skilled PT services to modify and progress therapeutic interventions, address functional mobility deficits, address ROM deficits, address strength deficits, analyze and address soft tissue restrictions, analyze and cue movement patterns, analyze and modify body mechanics/ergonomics and assess and modify postural abnormalities to attain remaining goals.      [x]  See Plan of Care  []  See progress note/recertification  []  See Discharge Summary         Progress towards goals / Updated goals:  See POC    PLAN  []  Upgrade activities as tolerated     [x]  Continue plan of care  []  Update interventions per flow sheet       []  Discharge due to:_  []  Other:_      Rad Cornell, PT 10/20/2020  9:36 AM

## 2020-10-20 NOTE — PROGRESS NOTES
[facility]  [address] [place], [address] [place]  Ph [phone]  Fx [phone]    Plan of Care/ Statement of Necessity for Physical Therapy Services    Patient name: Gurdeep Beauchamp Start of Care: 10/20/2020   Referral source: Simon Hernandez CNM : 1995    Medical Diagnosis: Pelvic pain [R10.2]   Onset Date:recent pregnancy, worsening since 2020 delivery    Treatment Diagnosis: Pelvic and perineal pain                        ICD-10: R10.2      Prior Hospitalization: see medical history Provider#: [id number]   Medications: Verified on Patient summary List    Comorbidities: July 15, 2020 vaginal delivery; tearing no known grade \"minor\", breastfeeding, heart palpitations   Prior Level of Function:  prior to pregnancy, no pelvic pain; functional with ADLs and IADLs             The Plan of Care and following information is based on the information from the initial evaluation. Assessment/ key information: Patient is a 30year old female who presents to Physical Therapy with pelvic pain. Patient presents with soft tissue restrictions, scar adhesions, and strength. Patient also presents with tightness and tenderness of the pelvic floor muscles, and decreased ability to contract, relax, and elongate the pelvic floor muscles. I feel patient will benefit from skilled therapeutic intervention to improve their ability to function at highest level possible.      Patient will continue to benefit from skilled PT services to modify and progress therapeutic interventions, address functional mobility deficits, address ROM deficits, address strength deficits, analyze and address soft tissue restrictions, analyze and cue movement patterns, analyze and modify body mechanics/ergonomics and assess and modify postural abnormalities to attain remaining goals.          Evaluation Complexity History MEDIUM  Complexity : 1-2 comorbidities / personal factors will impact the outcome/ POC ; Examination MEDIUM Complexity : 3 Standardized tests and measures addressing body structure, function, activity limitation and / or participation in recreation  ;Presentation MEDIUM Complexity : Evolving with changing characteristics  ; Clinical Decision Making MEDIUM Complexity : FOTO score of 26-74  Overall Complexity Rating: MEDIUM    Problem List: Pelvic pain/dysfunction, Decreased pelvic floor mm awareness, Decreased pelvic floor mm strength and Hypertonus of pelvic floor    Treatment Plan may include any combination of the following:   Therapeutic exercise, Neuromuscular re-education, Manual therapy, Physical agent/modality and Patient education  Patient / Family readiness to learn indicated by: asking questions, trying to perform skills and interest    Persons(s) to be included in education: patient (P)    Barriers to Learning/Limitations: None  Measures taken if barriers to learning: NA    Patient Goal (s): eliminate pain and be able to work out again    Patient Self Reported Health Status: excellent    Rehabilitation Potential: good    Short Term Goals: To be accomplished in 4 weeks:  Patient will be able to walk/run 1 mile with decreased pelvic pain, no more than 2/10  Status at eval: patient walk/run VAS pain rating 6/10    Patient will be able to maintain pelvic floor contraction for 10 seconds, 10 repetitions with no accessory substitution or breath holding. Status at eval: [facility] 3 seconds, 4 repetitions      Patient will be able to perform proper TrA contraction with functional activities ie squat, transfers independently with no VC/TC  Status at eval: patient requires TC/VC to perform TrA contraction in sitting/supine    Long Term Goals: To be accomplished in 8 weeks:  Patient will be able to lift 40# from floor with proper mechanics and no report of pain  Status at eval: Patient unable to lift/exercise without report of pain.     Patient will tolerate advanced ADLs including social, housework, and sporting/exercise activities with no abdominal pain or limitation. Status at eval: patient unable to run or perform previous exercise 2/2 pelvic discomfort      Patient will demonstrate pelvic floor muscle contraction 4/5 and ability to maintain contraction for 10 seconds, 10 repetitions. Status at eval: PFM 3/5, 3 second hold, 4 repetitions, with substitution  Frequency / Duration: Patient to be seen 2 times per week for 8 weeks. Patient/ Caregiver education and instruction: Diagnosis, prognosis, Proper Voiding Habits, Diet, Pain Management, Exercises and Bladder Retraining   [x]  Plan of care has been reviewed with PTA    Certification Period: KYLER To, PT 10/20/2020 9:35 AM    ________________________________________________________________________    I certify that the above Therapy Services are being furnished while the patient is under my care. I agree with the treatment plan and certify that this therapy is necessary.     Physician's Signature:____________________  Date:____________Time:____________    Please sign and return to [facility]  [address], [address] [place]  Ph [phone]  Fx [phone]

## 2020-10-23 ENCOUNTER — APPOINTMENT (OUTPATIENT)
Dept: PHYSICAL THERAPY | Age: 25
End: 2020-10-23
Payer: COMMERCIAL

## 2020-10-23 ENCOUNTER — HOSPITAL ENCOUNTER (OUTPATIENT)
Dept: PHYSICAL THERAPY | Age: 25
Discharge: HOME | End: 2020-10-23
Payer: COMMERCIAL

## 2020-10-23 PROCEDURE — 97110 THERAPEUTIC EXERCISES: CPT

## 2020-10-23 PROCEDURE — 97140 MANUAL THERAPY 1/> REGIONS: CPT

## 2020-10-23 NOTE — PROGRESS NOTES
PT DAILY TREATMENT NOTE    Patient Name: Shaka Polanco  MDAX:  : 1995  [x]  Patient  Verified  Payor: Raoul Banerjee / Plan: Ken Donahue / Product Type: HMO /    In time:930  Out time:102  Total Treatment Time (min): 50  Total Timed Codes (min): 50  1:1 Treatment Time ( [address] only): 50   Visit #: 2 of 16    Treatment Area: Pelvic pain [R10.2]    SUBJECTIVE  Pain Level (0-10 scale): 2/10 SP  Any medication changes, allergies to medications, adverse drug reactions, diagnosis change, or new procedure performed?: [x] No    [] Yes (see summary sheet for update)  Subjective functional status/changes:   [] No changes reported  Patient reports:  compliance with current HEP. Stretches have been easy, pain about same primarily with walking and rolling over at night. OBJECTIVE      15 min Therapeutic Exercise:  [x] See flow sheet :   Rationale: increase strength to improve the patients ability to improve stability and decrease pain and dysfunction    35 min Manual Therapy:  Bilat STM: iliopsoas, QL, piriformis, glut med, ITB, adductors   Rationale: increase ROM, increase tissue extensibility and decrease trigger points to improve the patients ability to restore pelvic symmetry and improve function            With   [] TE   [] TA   [] neuro   [] other: Patient Education: [x] Review HEP    [] Progressed/Changed HEP based on:   [] positioning   [] body mechanics   [] transfers   [] heat/ice application    [] other:      Other Objective/Functional Measures: initially with L ant/R post innominate; corrected with STM and stabs     Pain Level (0-10 scale) post treatment: 0    ASSESSMENT/Changes in Function: Patient tolerated today's session well with no c/o pain. Patient demonstrated understanding of today's instruction, education, and recommendations. Patient is progressing appropriately towards goals. Pelvic landmarks symmetrical, no torsions noted following session.  Patient reported decrease SP pain with bed mobility and transfers. Patient will continue to benefit from skilled PT services to modify and progress therapeutic interventions, address functional mobility deficits, address ROM deficits, address strength deficits, analyze and address soft tissue restrictions, analyze and cue movement patterns, analyze and modify body mechanics/ergonomics and assess and modify postural abnormalities to attain remaining goals. [x]  See Plan of Care  []  See progress note/recertification  []  See Discharge Summary         Progress towards goals / Updated goals:  Short Term Goals: To be accomplished in 4 weeks:  Patient will be able to walk/run 1 mile with decreased pelvic pain, no more than 2/10  Status at eval: patient walk/run VAS pain rating 6/10     Patient will be able to maintain pelvic floor contraction for 10 seconds, 10 repetitions with no accessory substitution or breath holding. Status at eval: PFMC 3 seconds, 4 repetitions       Patient will be able to perform proper TrA contraction with functional activities ie squat, transfers independently with no VC/TC  Status at eval: patient requires TC/VC to perform TrA contraction in sitting/supine  10/23/2020 introduced TrA contraction today with transfers and intro to core stabs.      Long Term Goals: To be accomplished in 8 weeks:  Patient will be able to lift 40# from floor with proper mechanics and no report of pain  Status at eval: Patient unable to lift/exercise without report of pain.     Patient will tolerate advanced ADLs including social, housework, and sporting/exercise activities with no abdominal pain or limitation. Status at eval: patient unable to run or perform previous exercise 2/2 pelvic discomfort       Patient will demonstrate pelvic floor muscle contraction 4/5 and ability to maintain contraction for 10 seconds, 10 repetitions.    Status at eval: PFM 3/5, 3 second hold, 4 repetitions, with substitution    PLAN  []  Upgrade activities as tolerated     [x]  Continue plan of care  [x]  Update interventions per flow sheet       []  Discharge due to:_  []  Other:_  Progress core staryan/SANTOSH Doss, PT 10/23/2020  9:35 AM    Future Appointments   Date Time Provider Viraj Ramirez   10/26/2020 11:15 AM Miguel A Hemami, PT [facility]   10/28/2020  8:45 AM Miguel A Hemami, PT [facility]   11/3/2020  9:30 AM Miguel A Heft, PT [facility]   11/5/2020  9:30 AM Miguel A Heft, PT [facility]

## 2020-10-26 ENCOUNTER — HOSPITAL ENCOUNTER (OUTPATIENT)
Dept: PHYSICAL THERAPY | Age: 25
Discharge: HOME | End: 2020-10-26
Payer: COMMERCIAL

## 2020-10-26 PROCEDURE — 97112 NEUROMUSCULAR REEDUCATION: CPT

## 2020-10-26 PROCEDURE — 97110 THERAPEUTIC EXERCISES: CPT

## 2020-10-26 NOTE — PROGRESS NOTES
PT DAILY TREATMENT NOTE    Patient Name: Saul Cerda  WCCZ:  : 1995  [x]  Patient  Verified  Payor: Consuelo Frazier / Plan: Brenda Miller / Product Type: HMO /    In time:1115  Out time:1200  Total Treatment Time (min): 45  Total Timed Codes (min): 45  1:1 Treatment Time ( [address] only): 39   Visit #: 3 of 16    Treatment Area: Pelvic pain [R10.2]    SUBJECTIVE  Pain Level (0-10 scale): 3 PS and R LQ  Any medication changes, allergies to medications, adverse drug reactions, diagnosis change, or new procedure performed?: [x] No    [] Yes (see summary sheet for update)  Subjective functional status/changes:   [] No changes reported  Patient reports:  compliance with current HEP. States she had no pain following last session for about 2 hours. States she went for a walk following last appt and was able to make it further in the walk than normal before having pain, 5 minutes vs usually about 90 seconds prior to pain onset. OBJECTIVE      15 min Therapeutic Exercise:  [x] See flow sheet :   Rationale: increase ROM and increase strength to improve the patients ability to decrease pain and increase functional stability      5 min Self Care: Thorough review and handout provided on the following: KT care and instructions   Rationale:  Increase awareness and understanding of current condition to improve patients ability to independently and effectively attain goals and progress towards long term management of current condition.       25 min Neuromuscular Re-education:  [x]  See flow sheet : TrA and breathing; stabilization   Rationale: increase strength and improve coordination  to improve the patients ability to decrease pain and increase functional stability and endurance      With   [] TE   [] TA   [] neuro   [] other: Patient Education: [x] Review HEP    [] Progressed/Changed HEP based on:   [] positioning   [] body mechanics   [] transfers   [] heat/ice application    [] other:      Other Objective/Functional Measures: Pelvic ladmarks neutral, no rotations or torsions noted     Pain Level (0-10 scale) post treatment: 1-2    ASSESSMENT/Changes in Function: Patient tolerated today's session well with no c/o pain. Patient demonstrated understanding of today's instruction, education, and recommendations. Patient is progressing appropriately towards goals. Patient still demo decreased endurance of postural stabilizers    Patient will continue to benefit from skilled PT services to modify and progress therapeutic interventions, address functional mobility deficits, address ROM deficits, address strength deficits, analyze and address soft tissue restrictions, analyze and cue movement patterns, analyze and modify body mechanics/ergonomics and assess and modify postural abnormalities to attain remaining goals. [x]  See Plan of Care  []  See progress note/recertification  []  See Discharge Summary         Progress towards goals / Updated goals:  Short Term Goals: To be accomplished in 4 weeks:  Patient will be able to walk/run 1 mile with decreased pelvic pain, no more than 2/10  Status at eval: patient walk/run VAS pain rating 6/10     Patient will be able to maintain pelvic floor contraction for 10 seconds, 10 repetitions with no accessory substitution or breath holding.   Status at eval: PFMC 3 seconds, 4 repetitions       Patient will be able to perform proper TrA contraction with functional activities ie squat, transfers independently with no VC/TC  Status at eval: patient requires TC/VC to perform TrA contraction in sitting/supine  10/26/2020 pt continues to demo decreased endurance and delayed firing with TrA during functional activities and therex     Long Term Goals: To be accomplished in 8 weeks:  Patient will be able to lift 40# from floor with proper mechanics and no report of pain  Status at eval: Patient unable to lift/exercise without report of pain.     Patient will tolerate advanced ADLs including social, housework, and sporting/exercise activities with no abdominal pain or limitation. Status at eval: patient unable to run or perform previous exercise 2/2 pelvic discomfort       Patient will demonstrate pelvic floor muscle contraction 4/5 and ability to maintain contraction for 10 seconds, 10 repetitions. Status at eval: PFM 3/5, 3 second hold, 4 repetitions, with substitution    PLAN  []  Upgrade activities as tolerated     [x]  Continue plan of care  [x]  Update interventions per flow sheet       []  Discharge due to:_  []  Other:_  Progress functional strengthening and stabs; PFM?     Phan Samaniego, PT 10/26/2020  11:19 AM    Future Appointments   Date Time Provider Viraj Ramirez   10/28/2020  8:45 AM Karina To, PT [facility]   11/3/2020  9:30 AM Tomas Arreaga PT [facility]   11/5/2020  9:30 AM Tomas Arreaga PT [facility]

## 2020-10-28 ENCOUNTER — HOSPITAL ENCOUNTER (OUTPATIENT)
Dept: PHYSICAL THERAPY | Age: 25
Discharge: HOME | End: 2020-10-28
Payer: COMMERCIAL

## 2020-10-28 PROCEDURE — 97140 MANUAL THERAPY 1/> REGIONS: CPT

## 2020-10-28 PROCEDURE — 97530 THERAPEUTIC ACTIVITIES: CPT

## 2020-10-28 PROCEDURE — 97110 THERAPEUTIC EXERCISES: CPT

## 2020-10-28 NOTE — PROGRESS NOTES
PT DAILY TREATMENT NOTE    Patient Name: Daniela Raza  ITIU:  : 1995  [x]  Patient  Verified  Payor: Ashlie Linares / Plan: Gregory Crawford / Product Type: HMO /    In MXIX:[id number]  Out BGWY:[id number]  Total Treatment Time (min): 70  Total Timed Codes (min): 70  1:1 Treatment Time ([facility] only): 79   Visit #: 4 of 16    Treatment Area: Pelvic pain [R10.2]    SUBJECTIVE  Pain Level (0-10 scale): 3/0 SP and RLQ  Any medication changes, allergies to medications, adverse drug reactions, diagnosis change, or new procedure performed?: [x] No    [] Yes (see summary sheet for update)  Subjective functional status/changes:   [] No changes reported  Patient reports:  compliance with current HEP. States exercises are getting easier and can feel abdominals firing more. Like KT from last session as she felt it stabilized her well. States pain is about the same. OBJECTIVE      15 min Therapeutic Exercise:  [x] See flow sheet : SIJ stabs   Rationale: increase strength to improve the patients ability to maintain proper pelvic alignment for decreased pain and increased function      10 min Therapeutic Activity:  [x]  See flow sheet :  TrA with transfers  KT SIJ and PS, taught pt how to apply for self-management  [facility] dena provided and instructions given for HEP   Rationale: improve coordination and learn self management tools  to improve the patients ability to manage pain and improve function     45 min Manual Therapy:  MFR lower abdomen and lumbopelvic region  Bilat STM Iliacus, psoas, QL, piriformis, glut med, ITB, adductors, obt internus (s/l and in figure 4)   Rationale: decrease pain, increase ROM, increase tissue extensibility, decrease trigger points and increase postural awareness to improve the patients ability to decrease pain and restore PLOF    With   [x] TE   [x] TA   [] neuro   [] other: Patient Education: [x] Review HEP    [] Progressed/Changed HEP based on: SIJ stabs added today  [] positioning   [] body mechanics   [] transfers   [] heat/ice application    [] other:      Other Objective/Functional Measures: no pelvic torsions/rotations noted     Pain Level (0-10 scale) post treatment: 0    ASSESSMENT/Changes in Function: Patient tolerated today's session well with no c/o pain. Patient demonstrated understanding of today's instruction, education, and recommendations. Patient is progressing appropriately towards goals. Patient will continue to benefit from skilled PT services to modify and progress therapeutic interventions, address functional mobility deficits, address ROM deficits, address strength deficits, analyze and address soft tissue restrictions, analyze and cue movement patterns, analyze and modify body mechanics/ergonomics and assess and modify postural abnormalities to attain remaining goals. [x]  See Plan of Care  []  See progress note/recertification  []  See Discharge Summary         Progress towards goals / Updated goals:  Short Term Goals: To be accomplished in 4 weeks:  Patient will be able to walk/run 1 mile with decreased pelvic pain, no more than 2/10  Status at eval: patient walk/run VAS pain rating 6/10     Patient will be able to maintain pelvic floor contraction for 10 seconds, 10 repetitions with no accessory substitution or breath holding.   Status at eval: PFMC 3 seconds, 4 repetitions       Patient will be able to perform proper TrA contraction with functional activities ie squat, transfers independently with no VC/TC  Status at eval: patient requires TC/VC to perform TrA contraction in sitting/supine  10/26/2020 pt continues to demo decreased endurance and delayed firing with TrA during functional activities and therex     Long Term Goals: To be accomplished in 8 weeks:  Patient will be able to lift 40# from floor with proper mechanics and no report of pain  Status at eval: Patient unable to lift/exercise without report of pain.     Patient will tolerate advanced ADLs including social, housework, and sporting/exercise activities with no abdominal pain or limitation. Status at eval: patient unable to run or perform previous exercise 2/2 pelvic discomfort       Patient will demonstrate pelvic floor muscle contraction 4/5 and ability to maintain contraction for 10 seconds, 10 repetitions. Status at eval: PFM 3/5, 3 second hold, 4 repetitions, with substitution    PLAN  []  Upgrade activities as tolerated     [x]  Continue plan of care  [x]  Update interventions per flow sheet       []  Discharge due to:_  []  Other:_  Continue progress stabs and functional strengthening; biofeedback?     Margaret Warren, PT 10/28/2020  8:47 AM    Future Appointments   Date Time Provider Viraj Ramirez   11/3/2020  9:30 AM Megha To, PT [facility]   11/5/2020  9:30 AM Yury Pereira, PT [facility]

## 2020-11-03 ENCOUNTER — HOSPITAL ENCOUNTER (OUTPATIENT)
Dept: PHYSICAL THERAPY | Age: 25
Discharge: HOME | End: 2020-11-03
Payer: COMMERCIAL

## 2020-11-03 PROCEDURE — 97110 THERAPEUTIC EXERCISES: CPT

## 2020-11-03 PROCEDURE — 97140 MANUAL THERAPY 1/> REGIONS: CPT

## 2020-11-03 NOTE — PROGRESS NOTES
PT DAILY TREATMENT NOTE    Patient Name: Tamiko Amos  Date:11/3/2020  : 1995  [x]  Patient  Verified  Payor: Izaiah Olivia / Plan: Alfa Herron / Product Type: HMO /    In time:930  Out time:101  Total Treatment Time (min): 45  Total Timed Codes (min): 45  1:1 Treatment Time ([address] only): 39   Visit #: 5 of 16    Treatment Area: Pelvic pain [R10.2]    SUBJECTIVE  Pain Level (0-10 scale): 2 right groin/perineum into R LQ  Any medication changes, allergies to medications, adverse drug reactions, diagnosis change, or new procedure performed?: [x] No    [] Yes (see summary sheet for update)  Subjective functional status/changes:   [] No changes reported  Patient reports: compliance with current HEP. States she is having longer periods of pain relief after PT sessions, however still pain increasing 2-3 hours following session. States she has been doing a lot more walking this last week and noting her pelvis feels \"off\" States pain has changed, no longer at pubis symphysis but rather now in right perineum, groin and lower abdomen. Liked Kinesiotape, ordered for self-management and should arrive today. OBJECTIVE  10 min Therapeutic Exercise:  [x] See flow sheet :   Rationale: increase ROM to improve the patients ability to maintain pelvic symmetry and decrease pain/tightness    35 min Manual Therapy:  MFR/STM perineum throughout superficial PFM  Deep PFM STM/trigger point release throughout; extensive time spent bilat obturator internus, mobs with movement.     Rationale: decrease pain, increase ROM, increase tissue extensibility and decrease trigger points to improve the patients ability to decrease pain and restore PLOF and pelvic symmetry            With   [] TE   [] TA   [] neuro   [] other: Patient Education: [x] Review HEP    [] Progressed/Changed HEP based on:   [] positioning   [] body mechanics   [] transfers   [] heat/ice application    [] other:      Other Objective/Functional Measures: no pelvic rotation noted however R LE excessive ER prior to manual; left SIJ hypomobile     Pain Level (0-10 scale) post treatment: 1 in low back/SIJ, no pain in groin/ant hip    ASSESSMENT/Changes in Function: Patient tolerated today's session well with no c/o pain. Patient demonstrated understanding of today's instruction, education, and recommendations. Patient is progressing appropriately towards goals. Patient demo neutral pelvic alignment and decrease pain following todays session. Patient will continue to benefit from skilled PT services to modify and progress therapeutic interventions, address functional mobility deficits, address ROM deficits, address strength deficits, analyze and address soft tissue restrictions, analyze and cue movement patterns, analyze and modify body mechanics/ergonomics and assess and modify postural abnormalities to attain remaining goals. [x]  See Plan of Care  []  See progress note/recertification  []  See Discharge Summary         Progress towards goals / Updated goals:  Short Term Goals: To be accomplished in 4 weeks:  Patient will be able to walk/run 1 mile with decreased pelvic pain, no more than 2/10  Status at eval: patient walk/run VAS pain rating 6/10       Patient will be able to maintain pelvic floor contraction for 10 seconds, 10 repetitions with no accessory substitution or breath holding.   Status at eval: PFMC 3 seconds, 4 repetitions       Patient will be able to perform proper TrA contraction with functional activities ie squat, transfers independently with no VC/TC  Status at eval: patient requires TC/VC to perform TrA contraction in sitting/supine  10/26/2020 pt continues to demo decreased endurance and delayed firing with TrA during functional activities and therex     Long Term Goals: To be accomplished in 8 weeks:  Patient will be able to lift 40# from floor with proper mechanics and no report of pain  Status at eval: Patient unable to lift/exercise without report of pain.     Patient will tolerate advanced ADLs including social, housework, and sporting/exercise activities with no abdominal pain or limitation. Status at eval: patient unable to run or perform previous exercise 2/2 pelvic discomfort       Patient will demonstrate pelvic floor muscle contraction 4/5 and ability to maintain contraction for 10 seconds, 10 repetitions.    Status at eval: PFM 3/5, 3 second hold, 4 repetitions, with substitution    PLAN  []  Upgrade activities as tolerated     [x]  Continue plan of care  [x]  Update interventions per flow sheet       []  Discharge due to:_  []  Other:_  Look at pelvic mobility in standing; progress core stabs and functional lifting    Buddy Londono PT 11/3/2020  11:50 AM    Future Appointments   Date Time Provider Viraj Ramirez   11/5/2020  9:30 AM Moon Alexander PT [facility]   11/10/2020  4:00 PM Moon Alexander PT [facility]   11/13/2020 10:15 AM Moon Alexander PT [facility]   11/17/2020 12:45 PM Moon Alexander PT [facility]   11/25/2020  8:00 AM Gato To, PT [facility]

## 2020-11-05 ENCOUNTER — HOSPITAL ENCOUNTER (OUTPATIENT)
Dept: PHYSICAL THERAPY | Age: 25
Discharge: HOME | End: 2020-11-05
Payer: COMMERCIAL

## 2020-11-05 PROCEDURE — 97112 NEUROMUSCULAR REEDUCATION: CPT

## 2020-11-05 PROCEDURE — 97530 THERAPEUTIC ACTIVITIES: CPT

## 2020-11-05 PROCEDURE — 97110 THERAPEUTIC EXERCISES: CPT

## 2020-11-05 NOTE — PROGRESS NOTES
PT DAILY TREATMENT NOTE    Patient Name: Nhung Machuca  Date:2020  : 1995  [x]  Patient  Verified  Payor: Rodney nAna / Plan: Michelle Gonzalez / Product Type: HMO /    In time:930  Out time:1030  Total Treatment Time (min): 60  Total Timed Codes (min): 60  1:1 Treatment Time ([facility] only): 60   Visit #: 6 of 16    Treatment Area: Pelvic pain [R10.2]    SUBJECTIVE  Pain Level (0-10 scale): 2/10 lower abdomen  Any medication changes, allergies to medications, adverse drug reactions, diagnosis change, or new procedure performed?: [x] No    [] Yes (see summary sheet for update)  Subjective functional status/changes:   [] No changes reported  Patient reports: compliance with current HEP. Following last session pain remained subsided for about 16 hours and then gradually increased to about 2-3/10 pain and was able to stretch and bring pain back down to manageable amount of discomfort.      OBJECTIVE    40 min Therapeutic Exercise:  [x] See flow sheet :   Rationale: increase ROM, increase strength and improve coordination to improve the patients ability to reduce pain and restore PLOF    10 min Therapeutic Activity:  [x]  See flow sheet : Ed on activity modification and pain/self-management tools; s/s monitoring with exercise and dialing back reps/holds/weight/sets for staying in pain free range  Performed self-release piriformis and obt internus with tennis ball for independent pain management    Rationale: increase proprioception and decrease pain  to improve the patients ability to restore PLOF and have pain management tools      10 min Neuromuscular Re-education:  [x]  See flow sheet : progress core stabilization exercises, TrA 20%    Rationale: increase strength and improve coordination  to improve the patients ability to progress towards functional and dynamic strengthening          With   [] TE   [] TA   [] neuro   [] other: Patient Education: [x] Review HEP    [] Progressed/Changed HEP based on: [] positioning   [] body mechanics   [] transfers   [] heat/ice application    [] other:        Pain Level (0-10 scale) post treatment: 1/10 lower abdomen    ASSESSMENT/Changes in Function: Patient tolerated today's session well with no c/o pain. Patient demonstrated understanding of today's instruction, education, and recommendations. Patient is progressing appropriately towards goals. Patient will continue to benefit from skilled PT services to modify and progress therapeutic interventions, address functional mobility deficits, address ROM deficits, address strength deficits, analyze and address soft tissue restrictions, analyze and cue movement patterns, analyze and modify body mechanics/ergonomics and assess and modify postural abnormalities to attain remaining goals. [x]  See Plan of Care  []  See progress note/recertification  []  See Discharge Summary         Progress towards goals / Updated goals:  Short Term Goals: To be accomplished in 4 weeks:  Patient will be able to walk/run 1 mile with decreased pelvic pain, no more than 2/10  Status at eval: patient walk/run VAS pain rating 6/10        Patient will be able to maintain pelvic floor contraction for 10 seconds, 10 repetitions with no accessory substitution or breath holding.   Status at eval: PFMC 3 seconds, 4 repetitions       Patient will be able to perform proper TrA contraction with functional activities ie squat, transfers independently with no VC/TC  Status at eval: patient requires TC/VC to perform TrA contraction in sitting/supine  11/5/2020 progressed more dynamic TrA stabilization exercises today in supine and with transfers; pt demo improved motor recruitment and coordination     Long Term Goals: To be accomplished in 8 weeks:  Patient will be able to lift 40# from floor with proper mechanics and no report of pain  Status at eval: Patient unable to lift/exercise without report of pain.     Patient will tolerate advanced ADLs including social, housework, and sporting/exercise activities with no abdominal pain or limitation. Status at eval: patient unable to run or perform previous exercise 2/2 pelvic discomfort       Patient will demonstrate pelvic floor muscle contraction 4/5 and ability to maintain contraction for 10 seconds, 10 repetitions.    Status at eval: PFM 3/5, 3 second hold, 4 repetitions, with substitution    PLAN  []  Upgrade activities as tolerated     [x]  Continue plan of care  [x]  Update interventions per flow sheet       []  Discharge due to:_  []  Other:_  Look at pelvic mobility in standing; progress core stabs and functional lifting    Kristina Moralez PT 11/5/2020  9:33 AM    Future Appointments   Date Time Provider Viraj Ramirez   11/10/2020  4:00 PM Judie Lopez PT [facility]   11/13/2020 10:15 AM Jackie Romero, [facility]   11/17/2020 12:45 PM Judie Lopez PT [facility]   11/25/2020  8:00 AM Jackie To, [facility]

## 2020-11-10 ENCOUNTER — HOSPITAL ENCOUNTER (OUTPATIENT)
Dept: PHYSICAL THERAPY | Age: 25
Discharge: HOME | End: 2020-11-10
Payer: COMMERCIAL

## 2020-11-10 PROCEDURE — 97140 MANUAL THERAPY 1/> REGIONS: CPT

## 2020-11-10 NOTE — PROGRESS NOTES
PT DAILY TREATMENT NOTE    Patient Name: Bronson Cloud  Date:11/10/2020  : 1995  [x]  Patient  Verified  Payor: Yvrose Goodman / Plan: Karis Collet / Product Type: HMO /    In time:400  Out time:445  Total Treatment Time (min): 45  Total Timed Codes (min): 45  1:1 Treatment Time ([address] only): 39   Visit #: 7 of 16    Treatment Area: Pelvic pain [R10.2]    SUBJECTIVE  Pain Level (0-10 scale): 4/10 LBP/glut and lower abdomen  Any medication changes, allergies to medications, adverse drug reactions, diagnosis change, or new procedure performed?: [x] No    [] Yes (see summary sheet for update)  Subjective functional status/changes:   [] No changes reported  Patient reports: compliance with current HEP. Patient reports she has noted some transitions with no pain and able to lay on side now. However feels like increased LBP and \"radiating into gluts\"    OBJECTIVE    45 min Manual Therapy:  STM/MFR lower abdomen: iliacus, psoas  MFR/STM superficial PFM primarily at [facility] and right sacrotuberous ligament, MWM  Deep PFM STM: throughout posterior PFM and trigger points  MET to correct L ant/R post innominate   Rationale: decrease pain, increase ROM, decrease trigger points and increase postural awareness to improve the patients ability to decrease pain and restore PLOF    With   [] TE   [] TA   [] neuro   [] other: Patient Education: [x] Review HEP    [] Progressed/Changed HEP based on:   [] positioning   [] body mechanics   [] transfers   [] heat/ice application    [] other:      Other Objective/Functional Measures: Left anterior/Right posterior innominate     Pain Level (0-10 scale) post treatment: .5/10 LBP 0/10 pelvic pain/abdomen    ASSESSMENT/Changes in Function: Patient tolerated today's session well with decrease c/o pain. Patient demonstrated understanding of today's instruction, education, and recommendations. Patient is progressing appropriately towards goals.  Patient demo neutral pelvic alignment following todays session    Patient will continue to benefit from skilled PT services to modify and progress therapeutic interventions, address functional mobility deficits, address ROM deficits, address strength deficits, analyze and address soft tissue restrictions, analyze and cue movement patterns, analyze and modify body mechanics/ergonomics and assess and modify postural abnormalities to attain remaining goals. [x]  See Plan of Care  []  See progress note/recertification  []  See Discharge Summary         Progress towards goals / Updated goals:  Short Term Goals: To be accomplished in 4 weeks:  Patient will be able to walk/run 1 mile with decreased pelvic pain, no more than 2/10  Status at eval: patient walk/run VAS pain rating 6/10        Patient will be able to maintain pelvic floor contraction for 10 seconds, 10 repetitions with no accessory substitution or breath holding. Status at eval: PFMC 3 seconds, 4 repetitions       Patient will be able to perform proper TrA contraction with functional activities ie squat, transfers independently with no VC/TC  Status at eval: patient requires TC/VC to perform TrA contraction in sitting/supine  11/5/2020 progressed more dynamic TrA stabilization exercises today in supine and with transfers; pt demo improved motor recruitment and coordination     Long Term Goals: To be accomplished in 8 weeks:  Patient will be able to lift 40# from floor with proper mechanics and no report of pain  Status at eval: Patient unable to lift/exercise without report of pain.     Patient will tolerate advanced ADLs including social, housework, and sporting/exercise activities with no abdominal pain or limitation. Status at eval: patient unable to run or perform previous exercise 2/2 pelvic discomfort       Patient will demonstrate pelvic floor muscle contraction 4/5 and ability to maintain contraction for 10 seconds, 10 repetitions.    Status at eval: PFM 3/5, 3 second hold, 4 repetitions, with substitution       PLAN  []  Upgrade activities as tolerated     [x]  Continue plan of care  [x]  Update interventions per flow sheet       []  Discharge due to:_  []  Other:_look at goals; 11/17 prog;   Look at pelvic mobility in standing; progress core stabs and functional lifting    Nicolás Montalvo, PT 11/10/2020  4:03 PM    Future Appointments   Date Time Provider Viraj Ramirez   11/13/2020 10:15 AM Celia To, PT [facility]   11/17/2020 12:45 PM Celia To, PT [facility]   11/25/2020  8:00 AM Celia To, [address] [facility]

## 2020-11-13 ENCOUNTER — HOSPITAL ENCOUNTER (OUTPATIENT)
Dept: PHYSICAL THERAPY | Age: 25
Discharge: HOME | End: 2020-11-13
Payer: COMMERCIAL

## 2020-11-13 PROCEDURE — 97140 MANUAL THERAPY 1/> REGIONS: CPT

## 2020-11-13 PROCEDURE — 97535 SELF CARE MNGMENT TRAINING: CPT

## 2020-11-13 NOTE — PROGRESS NOTES
PT DAILY TREATMENT NOTE    Patient Name: Nahomi Cavazos  Date:2020  : 1995  [x]  Patient  Verified  Payor: Bart Islas / Plan: Prashant Hamm / Product Type: HMO /    In time:1015  Out time:1100  Total Treatment Time (min): 45  Total Timed Codes (min): 45  1:1 Treatment Time ([address] only): 45   Visit #: 8 of 16    Treatment Area: Pelvic pain [R10.2]    SUBJECTIVE  Pain Level (0-10 scale): 2/10 pelvic pain ache; LBP 0/10 and abdomen . 5/10   Any medication changes, allergies to medications, adverse drug reactions, diagnosis change, or new procedure performed?: [x] No    [] Yes (see summary sheet for update)  Subjective functional status/changes:   [] No changes reported  Patient reports: compliance with current HEP. States she has not been walking a lot in the last week, initially because of continued pain with walking but then because of rain. States she has noted a decrease in LBP overall but increase in pubic pain, dull ache now and tender to touch. OBJECTIVE    10 min Self Care: Thorough review and handout provided on the following: pelvic wand rationale, instruction and purchasing recommendations for self- management   Rationale:  Increase awareness and understanding of current condition to improve patients ability to independently and effectively attain goals and progress towards long term management of current condition.      35 min Manual Therapy:  Deep PFM STM/trigger point release throughout deep pelvic floor muscles   Rationale: decrease pain, increase tissue extensibility, decrease trigger points and increase postural awareness to improve the patients ability to decrease pain and improve function    With   [] TE   [] TA   [] neuro   [] other: Patient Education: [x] Review HEP    [] Progressed/Changed HEP based on:   [] positioning   [] body mechanics   [] transfers   [] heat/ice application    [] other:      Other Objective/Functional Measures: pelvic landmarks neutral, no torsions or rotations noted     Pain Level (0-10 scale) post treatment: 0    ASSESSMENT/Changes in Function: Patient tolerated today's session well with decreased c/o pain. Patient demonstrated understanding of today's instruction, education, and recommendations. Patient is progressing appropriately towards goals. Patient will continue to benefit from skilled PT services to modify and progress therapeutic interventions, address functional mobility deficits, address ROM deficits, address strength deficits, analyze and address soft tissue restrictions, analyze and cue movement patterns, analyze and modify body mechanics/ergonomics and assess and modify postural abnormalities to attain remaining goals. [x]  See Plan of Care  []  See progress note/recertification  []  See Discharge Summary         Progress towards goals / Updated goals:  Short Term Goals: To be accomplished in 4 weeks:  Patient will be able to walk/run 1 mile with decreased pelvic pain, no more than 2/10  Status at eval: patient walk/run VAS pain rating 6/10   11/13/2020 Pain about 6/10 with walking one mile about a week ago.      Patient will be able to maintain pelvic floor contraction for 10 seconds, 10 repetitions with no accessory substitution or breath holding.   Status at eval: PFMC 3 seconds, 4 repetitions       Patient will be able to perform proper TrA contraction with functional activities ie squat, transfers independently with no VC/TC  Status at eval: patient requires TC/VC to perform TrA contraction in sitting/supine  11/5/2020 progressed more dynamic TrA stabilization exercises today in supine and with transfers; pt demo improved motor recruitment and coordination     Long Term Goals: To be accomplished in 8 weeks:  Patient will be able to lift 40# from floor with proper mechanics and no report of pain  Status at eval: Patient unable to lift/exercise without report of pain.     Patient will tolerate advanced ADLs including social, housework, and sporting/exercise activities with no abdominal pain or limitation. Status at eval: patient unable to run or perform previous exercise 2/2 pelvic discomfort       Patient will demonstrate pelvic floor muscle contraction 4/5 and ability to maintain contraction for 10 seconds, 10 repetitions.    Status at eval: PFM 3/5, 3 second hold, 4 repetitions, with substitution    PLAN  []  Upgrade activities as tolerated     [x]  Continue plan of care  [x]  Update interventions per flow sheet       []  Discharge due to:_  []  Other:_      Katlin De La Torre PT 11/13/2020  10:21 AM    Future Appointments   Date Time Provider Viraj Ramirez   11/17/2020 12:45 PM Len To, PT [facility]   11/25/2020  8:00 AM Stephen Arenas PT [facility]   11/30/2020 11:15 AM Stephen Arenas PT [facility]   12/4/2020  1:30 PM Stephen Arenas PT [facility]

## 2020-11-17 ENCOUNTER — APPOINTMENT (OUTPATIENT)
Dept: PHYSICAL THERAPY | Age: 25
End: 2020-11-17
Payer: COMMERCIAL

## 2020-11-25 ENCOUNTER — APPOINTMENT (OUTPATIENT)
Dept: PHYSICAL THERAPY | Age: 25
End: 2020-11-25
Payer: COMMERCIAL

## 2020-11-27 ENCOUNTER — HOSPITAL ENCOUNTER (OUTPATIENT)
Dept: PHYSICAL THERAPY | Age: 25
Discharge: HOME | End: 2020-11-27
Payer: COMMERCIAL

## 2020-11-27 PROCEDURE — 97530 THERAPEUTIC ACTIVITIES: CPT

## 2020-11-27 PROCEDURE — 97535 SELF CARE MNGMENT TRAINING: CPT

## 2020-11-27 PROCEDURE — 97110 THERAPEUTIC EXERCISES: CPT

## 2020-11-27 NOTE — PROGRESS NOTES
[facility]  [address] [place], [address] [facility] [place]  Ph [phone]  Fx [phone]    Pelvic Floor Progress Note  Patient name: Bella Jang Start of Care: 10/20/20   Referral source: Sigrid Trejo CNM : 1995   Medical/Treatment Diagnosis: Pelvic pain [R10.2] Onset Date:recent pregnancy, worsening since 2020 delivery     Prior Hospitalization: see medical history Provider#: [id number]   Medications: Verified on Patient Summary List    Comorbidities: July 15, 2020 vaginal delivery; tearing no known grade \"minor\", breastfeeding, heart palpitations   Prior Level of Function:  prior to pregnancy, no pelvic pain; functional with ADLs and IADLs     Visits from Start of Care: 8    Missed Visits: 0    Established Goals:           Excellent Good         Limited           None  [x] Increase Pelvic MM strength       []  [x]  [x]  []  [x] Decrease Pelvic MM hypertonus     []  [x]  []  []  [] Decrease Incontinence Episodes    []  []  []  []   [] Improve Voiding Habits        []  []  []  []   [] Decreased Urgency        []  []  []  []  [x] Decrease Pelvic Pain        []  [x]  []  []    Key Functional Changes: Patient progressing towards goals appropriately, less overall pelvic pain with ADLs and increasing activities with minimal pelvic pain/limitation.  Patient still with c/o pain with intercourse and inability to progress exercise/activity levels 2/2 fear of pain and instability    Updated Goals: to be achieved in 8 weeks:  Progress towards goals / Updated goals:  Short Term Goals: To be accomplished in 4 weeks:  Patient will be able to walk/run 1 mile with decreased pelvic pain, no more than 2/10  Status at eval: patient walk/run VAS pain rating 6/10  20 walking 1 mile with main at 2/10 in PS and lower abdomen but unable to run 2/2 fear of pain and instability; GOAL MET        Patient will be able to maintain pelvic floor contraction for 10 seconds, 10 repetitions with no accessory substitution or breath holding. Status at eval: PFMC 3 seconds, 4 repetitions   11/13/20: patient doing kegels BID sitting, reports losing contraction at about 7 seconds, 6 reps; to be assessed next session.       Patient will be able to perform proper TrA contraction with functional activities ie squat, transfers independently with no VC/TC  Status at eval: patient requires TC/VC to perform TrA contraction in sitting/supine  11/13/20 performing TrA functionally with  independently, no pain     Long Term Goals: To be accomplished in 8 weeks:  Patient will be able to lift 40# from floor with proper mechanics and no report of pain  Status at eval: Patient unable to lift/exercise without report of pain. 11/13/20 no longer pain with childcare, child 15# however not yet lifting heavier     Patient will tolerate advanced ADLs including social, housework, and sporting/exercise activities with no abdominal pain or limitation. Status at eval: patient unable to run or perform previous exercise 2/2 pelvic discomfort    11/13/20 generally reporting 0-2/10 pain with ADLs, unable to run 2/2 fear of pain and instability     Patient will demonstrate pelvic floor muscle contraction 4/5 and ability to maintain contraction for 10 seconds, 10 repetitions.    Status at eval: PFM 3/5, 3 second hold, 4 repetitions, with substitution  11/13/20: NT, performing kegels BID sitting     UPDATED GOALS:    Patient will tolerate med dilator, dynamic insertion for 10 minutes (or intercourse) with pain no more than 2/10  Status at 11/13/20: all insertion elicits 3/24 pain, intercourse 4/10 pain (initial and deep)    ASSESSMENT/RECOMMENDATIONS:  []Continue therapy per initial plan/protocol at a frequency of  2 x per week for 8 weeks  []Continue therapy with the following recommended changes:_____________________      _____________________________________________________________________  []Discontinue therapy progressing towards or have reached established goals  []Discontinue therapy due to lack of appreciable progress towards goals  []Discontinue therapy due to lack of attendance or compliance  []Await Physician's recommendations/decisions regarding therapy  []Other:________________________________________________________________    Thank you for this referral.   Van Paulino, PT 11/27/2020 12:42 PM  NOTE TO PHYSICIAN:  Kathy Paulino [address]   FAX TO [facility]: [phone]  If you are unable to process this request in 24 hours please contact our office: [phone]      ? I have read the above report and request that my patient continue as recommended. ? I have read the above report and request that my patient continue therapy with the following changes/special instructions:___________________________________________________________  ? I have read the above report and request that my patient be discharged from therapy.

## 2020-11-30 ENCOUNTER — APPOINTMENT (OUTPATIENT)
Dept: PHYSICAL THERAPY | Age: 25
End: 2020-11-30
Payer: COMMERCIAL

## 2020-12-04 ENCOUNTER — HOSPITAL ENCOUNTER (OUTPATIENT)
Dept: PHYSICAL THERAPY | Age: 25
Discharge: HOME | End: 2020-12-04
Payer: COMMERCIAL

## 2020-12-04 PROCEDURE — 97140 MANUAL THERAPY 1/> REGIONS: CPT

## 2020-12-04 PROCEDURE — 97112 NEUROMUSCULAR REEDUCATION: CPT

## 2020-12-04 NOTE — PROGRESS NOTES
PT DAILY TREATMENT NOTE    Patient Name: Mic Anna  ULOC:  : 1995  [x]  Patient  Verified  Payor: Jodee Whitney / Plan: Liban Wen / Product Type: HMO /    In time:130  Out time:225  Total Treatment Time (min): 55  Total Timed Codes (min): 55  1:1 Treatment Time ( [address] only): 54   Visit #: 10 of 30    Treatment Area: Pelvic pain [R10.2]    SUBJECTIVE  Pain Level (0-10 scale): 0  Any medication changes, allergies to medications, adverse drug reactions, diagnosis change, or new procedure performed?: [x] No    [] Yes (see summary sheet for update)  Subjective functional status/changes:   [] No changes reported  Patient reports: compliance with current HEP. States she is still skipping stretches that irritate pelvic pain but exercises have been feeling good; pain over the last week has not gone higher than 3/10. Today feeling good.      OBJECTIVE    15 min Neuromuscular Re-education:  [x]  See flow sheet : PFM 10/10/10 with basement drops between reps for full relaxation to improve motor recruitment and endurance  2/2/10  Biofeedback via internal digital palpation   Rationale: increase ROM, increase strength, improve coordination and increase proprioception  to improve the patients ability to improve quality of pelvic floor contraction and overall motor recruitment    40 min Manual Therapy:  Introitus 1 dimensional stretch throughout  Deep PFM: trigger point release throughout, primarily bilat obt internus  Left proximal LE STM/Triggerpoint release: ITB, HS, gastroc, quads  Patellar mobs sup/inf   Rationale: decrease pain, increase tissue extensibility, decrease trigger points and increase postural awareness to improve the patients ability to restore PLOF and return to exercise with less pain            With   [] TE   [] TA   [x] neuro   [] other: Patient Education: [x] Review HEP    [] Progressed/Changed HEP based on:   [] positioning   [] body mechanics   [] transfers   [] heat/ice application    [] other:        Pain Level (0-10 scale) post treatment: 0    ASSESSMENT/Changes in Function: Patient tolerated today's session well with no c/o pain. Patient demonstrated understanding of today's instruction, education, and recommendations. Patient is progressing appropriately towards goals. Pt initially demo incomplete relaxation following kegel; demo improved motor recruitment following elongation prior to contractions. Patient will continue to benefit from skilled PT services to modify and progress therapeutic interventions, address functional mobility deficits, address ROM deficits, address strength deficits, analyze and address soft tissue restrictions, analyze and cue movement patterns, analyze and modify body mechanics/ergonomics and assess and modify postural abnormalities to attain remaining goals. [x]  See Plan of Care  []  See progress note/recertification  []  See Discharge Summary         Progress towards goals / Updated goals:  Short Term Goals: To be accomplished in 4 weeks:  Patient will be able to walk/run 1 mile with decreased pelvic pain, no more than 2/10  Status at eval: patient walk/run VAS pain rating 6/10  11/13/20 walking 1 mile with main at 2/10 in PS and lower abdomen but unable to run 2/2 fear of pain and instability; GOAL MET        Patient will be able to maintain pelvic floor contraction for 10 seconds, 10 repetitions with no accessory substitution or breath holding.   Status at eval: PFMC 3 seconds, 4 repetitions   11/13/20: patient doing kegels BID sitting, reports losing contraction at about 7 seconds, 6 reps; to be assessed next session.       Patient will be able to perform proper TrA contraction with functional activities ie squat, transfers independently with no VC/TC  Status at eval: patient requires TC/VC to perform TrA contraction in sitting/supine  11/13/20 performing TrA functionally with  independently, no pain     Long Term Goals: To be accomplished in 8 weeks:  Patient will be able to lift 40# from floor with proper mechanics and no report of pain  Status at eval: Patient unable to lift/exercise without report of pain. 11/13/20 no longer pain with childcare, child 15# however not yet lifting heavier  11/27/2020 intro to functional lifting today     Patient will tolerate advanced ADLs including social, housework, and sporting/exercise activities with no abdominal pain or limitation. Status at eval: patient unable to run or perform previous exercise 2/2 pelvic discomfort    11/13/20 generally reporting 0-2/10 pain with ADLs, unable to run 2/2 fear of pain and instability     Patient will demonstrate pelvic floor muscle contraction 4/5 and ability to maintain contraction for 10 seconds, 10 repetitions.    Status at eval: PFM 3/5, 3 second hold, 4 repetitions, with substitution  12/4/20: 3/5 7 second hold; 10 reps, no sub     UPDATED GOALS:    Patient will tolerate med dilator, dynamic insertion for 10 minutes (or intercourse) with pain no more than 2/10  Status at 11/13/20: all insertion elicits 9/25 pain, intercourse 4/10 pain (initial and deep)    PLAN  []  Upgrade activities as tolerated     [x]  Continue plan of care  [x]  Update interventions per flow sheet       []  Discharge due to:_  []  Other:_      Buddy Londono PT 12/4/2020  1:34 PM    Future Appointments   Date Time Provider Viraj Ramirez   12/8/2020 12:45 PM Gato To, PT [facility]   12/11/2020 11:15 AM Gato Jerez, PT [facility]   12/14/2020 12:45 PM Gato To PT [facility]   12/17/2020 11:00 AM Moon Alexander [facility]

## 2020-12-08 ENCOUNTER — HOSPITAL ENCOUNTER (OUTPATIENT)
Dept: PHYSICAL THERAPY | Age: 25
Discharge: HOME | End: 2020-12-08
Payer: COMMERCIAL

## 2020-12-08 PROCEDURE — 97140 MANUAL THERAPY 1/> REGIONS: CPT

## 2020-12-08 NOTE — PROGRESS NOTES
PT DAILY TREATMENT NOTE    Patient Name: Frank Hernandez  Date:2020  : 1995  [x]  Patient  Verified  Payor: Slim Hollis / Plan: Tad Hair / Product Type: HMO /    In time:1245  Out time:130  Total Treatment Time (min): 45  Total Timed Codes (min): 45  1:1 Treatment Time ([address] only): 39   Visit #: 11 of 30    Treatment Area: Pelvic pain [R10.2]    SUBJECTIVE  Pain Level (0-10 scale): 0  Any medication changes, allergies to medications, adverse drug reactions, diagnosis change, or new procedure performed?: [x] No    [] Yes (see summary sheet for update)  Subjective functional status/changes:   [] No changes reported  Patient reports: compliance with current HEP. States she accidentally increased DL weight to 40# and did so without any pain; sumo squats not bothering knee since last session. Has had minimal to no abdominopelvic pain in the last week; reports she is feeling really good. Pain as high as 1/10 after prolonged positions. OBJECTIVE    45 min Manual Therapy:  Introitus prolonged holds, 1-3 dimensional   Deep PFM: STM/MFR throughout posterior PFM  Contract/relax for elongation and ROM   Rationale: decrease pain, increase tissue extensibility and decrease trigger points to improve the patients ability to progress towards pain free penetration and intercourse            With   [] TE   [x] TA   [] neuro   [] other: Patient Education: [x] Review HEP    [] Progressed/Changed HEP based on:   [] positioning   [] body mechanics   [] transfers   [] heat/ice application    [] other:         Pain Level (0-10 scale) post treatment: 0    ASSESSMENT/Changes in Function: Patient tolerated today's session well with no c/o pain. Patient demonstrated understanding of today's instruction, education, and recommendations. Patient is progressing appropriately towards goals.      Patient will continue to benefit from skilled PT services to modify and progress therapeutic interventions, address functional mobility deficits, address ROM deficits, address strength deficits, analyze and address soft tissue restrictions, analyze and cue movement patterns, analyze and modify body mechanics/ergonomics and assess and modify postural abnormalities to attain remaining goals. [x]  See Plan of Care  []  See progress note/recertification  []  See Discharge Summary         Progress towards goals / Updated goals:  Short Term Goals: To be accomplished in 4 weeks:  Patient will be able to walk/run 1 mile with decreased pelvic pain, no more than 2/10  Status at eval: patient walk/run VAS pain rating 6/10  11/13/20 walking 1 mile with main at 2/10 in PS and lower abdomen but unable to run 2/2 fear of pain and instability; GOAL MET        Patient will be able to maintain pelvic floor contraction for 10 seconds, 10 repetitions with no accessory substitution or breath holding. Status at eval: PFMC 3 seconds, 4 repetitions   11/13/20: patient doing kegels BID sitting, reports losing contraction at about 7 seconds, 6 reps; to be assessed next session.       Patient will be able to perform proper TrA contraction with functional activities ie squat, transfers independently with no VC/TC  Status at eval: patient requires TC/VC to perform TrA contraction in sitting/supine  11/13/20 performing TrA functionally with  independently, no pain     Long Term Goals: To be accomplished in 8 weeks:  Patient will be able to lift 40# from floor with proper mechanics and no report of pain  Status at eval: Patient unable to lift/exercise without report of pain. 11/13/20 no longer pain with childcare, child 15# however not yet lifting heavier  11/27/2020 intro to functional lifting today     Patient will tolerate advanced ADLs including social, housework, and sporting/exercise activities with no abdominal pain or limitation.    Status at eval: patient unable to run or perform previous exercise 2/2 pelvic discomfort    11/13/20 generally reporting 0-2/10 pain with ADLs, unable to run 2/2 fear of pain and instability     Patient will demonstrate pelvic floor muscle contraction 4/5 and ability to maintain contraction for 10 seconds, 10 repetitions.    Status at eval: PFM 3/5, 3 second hold, 4 repetitions, with substitution  12/4/20: 3/5 7 second hold; 10 reps, no sub     UPDATED GOALS:    Patient will tolerate med dilator, dynamic insertion for 10 minutes (or intercourse) with pain no more than 2/10  Status FR 36/32/81: IGZ SNBBSHWOI elicits 5/83 pain, VKCRDCNTOPT 9/37 pain (initial and deep)       PLAN  []  Upgrade activities as tolerated     [x]  Continue plan of care  [x]  Update interventions per flow sheet       []  Discharge due to:_  []  Other:_  Next: progress note/ROXY Moody PT 12/8/2020  12:50 PM    Future Appointments   Date Time Provider Viraj Ramirez   12/11/2020 11:15 AM Dorothy To, PT [facility]   12/17/2020 11:00 AM Mayte James [facility]   12/24/2020 11:00 AM Mayte James PT [facility]   12/28/2020 12:45 PM Dorothy To, PT [facility]   12/31/2020  9:30 AM Dorothy To, [facility]

## 2020-12-11 ENCOUNTER — HOSPITAL ENCOUNTER (OUTPATIENT)
Dept: PHYSICAL THERAPY | Age: 25
Discharge: HOME | End: 2020-12-11
Payer: COMMERCIAL

## 2020-12-11 PROCEDURE — 97112 NEUROMUSCULAR REEDUCATION: CPT

## 2020-12-11 PROCEDURE — 97535 SELF CARE MNGMENT TRAINING: CPT

## 2020-12-11 NOTE — PROGRESS NOTES
PT DAILY TREATMENT NOTE    Patient Name: Romi Rodriguez  LMLI:  : 1995  [x]  Patient  Verified  Payor: Edwar Chaudhary / Plan: Lizette Stewart / Product Type: HMO /    In time:1115  Out time:1200  Total Treatment Time (min): 45  Total Timed Codes (min): 45  1:1 Treatment Time ( [address] only): 39   Visit #: 12 of 30    Treatment Area: Pelvic pain [R10.2]    SUBJECTIVE  Pain Level (0-10 scale): 0  Any medication changes, allergies to medications, adverse drug reactions, diagnosis change, or new procedure performed?: [x] No    [] Yes (see summary sheet for update)  Subjective functional status/changes:   [] No changes reported  Patient reports: compliance with current HEP. Patient reports overall 90% improvement since starting PFPT in terms of pain and management tools. Still would like to work on strength progressions and \"tying all the things together. \"     OBJECTIVE        15 min Self Care: Thorough review progress and goals to date  Ed on exercise progressions  Biofeedback expectations, results, and implications for tx   Rationale:  Increase awareness and understanding of current condition to improve patients ability to independently and effectively attain goals and progress towards long term management of current condition.      30 min Neuromuscular Re-education:  [x]  See flow sheet :   Rationale: increase strength, improve coordination and increase proprioception  to improve the patients ability to restore PLOF and physical activity     Biofeedback expectations and implications were reviewed with patient prior to intervention,   Performed sEMG with perianal electrodes as follows:    Position, initial resting tone Work/Rest/Reps Comments   Supine       Sitting   10/10/15    /10   with exhale With rest breaks for review; decreasing motor recruitment after about 3 seconds; decreased endurance but still with 10 second hold; benefits from squeeze cue    Good coordination with quicks,     Slight delay in relaxation with long and short holds    Pt benefits from gradual ramp up with longer holds     Standing       sitting   Elevator rise/fall, mass practice Mid ranges with good coordination, notable fatigue however min VC for normal breathing and proper form. Neuromuscular Re-education was provided with visual, verbal and tactile cueing throughout intervention  Results and implications for treatment and HEP were reviewed in detail with patient during and following today's intervention. With   [] TE   [] TA   [x] neuro   [] other: Patient Education: [x] Review HEP    [] Progressed/Changed HEP based on:   [] positioning   [] body mechanics   [] transfers   [] heat/ice application    [] other:         Pain Level (0-10 scale) post treatment: 0    ASSESSMENT/Changes in Function: Patient tolerated today's session well with no c/o pain. Patient demonstrated understanding of today's instruction, education, and recommendations. Patient is progressing appropriately towards goals. Patient will continue to benefit from skilled PT services to modify and progress therapeutic interventions, address functional mobility deficits, address ROM deficits, address strength deficits, analyze and address soft tissue restrictions, analyze and cue movement patterns, analyze and modify body mechanics/ergonomics and assess and modify postural abnormalities to attain remaining goals.      []  See Plan of Care  [x]  See progress note/recertification  []  See Discharge Summary         Progress towards goals / Updated goals:  Short Term Goals: To be accomplished in 4 weeks:  Patient will be able to walk/run 1 mile with decreased pelvic pain, no more than 2/10  Status at eval: patient walk/run VAS pain rating 6/10  11/13/20 walking 1 mile with main at 2/10 in PS and lower abdomen but unable to run 2/2 fear of pain and instability; GOAL MET        Patient will be able to maintain pelvic floor contraction for 10 seconds, 10 repetitions with no accessory substitution or breath holding. Status at eval: PFMC 3 seconds, 4 repetitions   11/13/20: patient doing kegels BID sitting, reports losing contraction at about 7 seconds, 6 reps; to be assessed next session.   12/11/2020 8 seconds, 10 reps; [address] will be able to perform proper TrA contraction with functional activities ie squat, transfers independently with no VC/TC  Status at eval: patient requires TC/VC to perform TrA contraction in sitting/supine  11/13/20 performing TrA functionally with  independently, no pain  12/11/2020 Performing functional lifts and strengthening with TrA , proper form and no pain; GOAL MET     Long Term Goals: To be accomplished in 8 weeks:  Patient will be able to lift 40# from floor with proper mechanics and no report of pain  Status at eval: Patient unable to lift/exercise without report of pain. 11/13/20 no longer pain with childcare, child 15# however not yet lifting heavier  12/11/2020 Patient lifting 40# with proper form with HEP without any pain and proper form; GOAL MET    Patient will tolerate advanced ADLs including social, housework, and sporting/exercise activities with no abdominal pain or limitation. Status at eval: patient unable to run or perform previous exercise 2/2 pelvic discomfort    11/13/20 generally reporting 0-2/10 pain with ADLs, unable to run 2/2 fear of pain and instability  12/11/2020 Pain 0-1 with ADLs, have not tried running so unable to assess but reports more 2/2 \"lack of time rather than instability or fear of pain\"       Patient will demonstrate pelvic floor muscle contraction 4/5 and ability to maintain contraction for 10 seconds, 10 repetitions.    Status at eval: PFM 3/5, 3 second hold, 4 repetitions, with substitution  12/11/2020 via sEMG decreased motor recruitment and endurance however able to maintain PFM contraction 10 seconds, min VC     UPDATED GOALS:    Patient will tolerate med dilator, dynamic insertion for 10 minutes (or intercourse) with pain no more than 2/10  Status CF 32/69/19: TGS WLPRSYZOA elicits 6/71 pain, EEOGOMCXIKT 6/84 pain (initial and deep)  12/11/2020 Progressing towards goal in management tools and in therapy, has not had intercourse so unable to assess but using pelvic wand as management tool for initial and deep penetration pain; Pain with internal palpation up to 4/10 and pelvic wand 3/10       PLAN  []  Upgrade activities as tolerated     [x]  Continue plan of care  [x]  Update interventions per flow sheet       []  Discharge due to:_  []  Other:_      Landy Simeon, PT 12/11/2020  11:19 AM    Future Appointments   Date Time Provider Viraj Ramirez   12/17/2020 11:00 AM Ashley Reynolds, PT [facility]   12/24/2020 11:00 AM Ashley Reynolds PT [facility]   12/28/2020 12:45 PM Xiomara To, PT [facility]   12/31/2020  9:30 AM Xiomara Alcantara, [facility]

## 2020-12-11 NOTE — PROGRESS NOTES
[facility]  [address] [place], [address] [facility] [place]  Ph [phone]  Fx [phone]    Pelvic Floor Progress Note  Patient name: Yulissa Dorado Start of Care: 10/20/20   Referral source: Libbie Boas, CNM : 1995   Medical/Treatment Diagnosis: Pelvic pain [R10.2] Onset Date:recent pregnancy, worsening since 2020 delivery     Prior Hospitalization: see medical history Provider#: [id number]   Medications: Verified on Patient Summary List    Comorbidities: July 15, 2020 vaginal delivery; tearing no known grade \"minor\", breastfeeding, heart palpitations   Prior Level of Function:  prior to pregnancy, no pelvic pain; functional with ADLs and IADLs     Visits from Start of Care: 12    Missed Visits: 1    Established Goals:           Excellent Good         Limited           None  [x] Increase Pelvic MM strength       []  [x]  []  []  [x] Decrease Pelvic MM hypertonus     []  [x]  []  []  [] Decrease Incontinence Episodes    []  []  []  []   [] Improve Voiding Habits        []  []  []  []   [] Decreased Urgency        []  []  []  []  [x] Decrease Pelvic Pain        []  [x]  []  []    Key Functional Changes: Patient reports overall 90% improvement in pain and function since starting PFPT. Now progressing towards dynamic strengthening goals and pain with intercourse goals. Updated Goals: to be achieved in 8 weeks:   Short Term Goals: To be accomplished in 4 weeks:  Patient will be able to walk/run 1 mile with decreased pelvic pain, no more than 2/10  Status at eval: patient walk/run VAS pain rating 6/10  20 walking 1 mile with main at 2/10 in PS and lower abdomen but unable to run 2/2 fear of pain and instability; GOAL MET        Patient will be able to maintain pelvic floor contraction for 10 seconds, 10 repetitions with no accessory substitution or breath holding.   Status at eval: PF 3 seconds, 4 repetitions   20: patient doing kegels BID sitting, reports losing contraction at about 7 seconds, 6 reps; to be assessed next session.   12/11/2020 8 seconds, 10 reps; PROGRESSING TOWARDS GOAL      Patient will be able to perform proper TrA contraction with functional activities ie squat, transfers independently with no VC/TC  Status at eval: patient requires TC/VC to perform TrA contraction in sitting/supine  11/13/20 performing TrA functionally with  independently, no pain  12/11/2020 Performing functional lifts and strengthening with TrA , proper form and no pain; GOAL MET     Long Term Goals: To be accomplished in 8 weeks:  Patient will be able to lift 40# from floor with proper mechanics and no report of pain  Status at eval: Patient unable to lift/exercise without report of pain. 11/13/20 no longer pain with childcare, child 15# however not yet lifting heavier  12/11/2020 Patient lifting 40# with proper form with HEP without any pain and proper form; GOAL MET     Patient will tolerate advanced ADLs including social, housework, and sporting/exercise activities with no abdominal pain or limitation. Status at eval: patient unable to run or perform previous exercise 2/2 pelvic discomfort    11/13/20 generally reporting 0-2/10 pain with ADLs, unable to run 2/2 fear of pain and instability  12/11/2020 Pain 0-1 with ADLs, have not tried running so unable to assess but reports more 2/2 \"lack of time rather than instability or fear of pain\"        Patient will demonstrate pelvic floor muscle contraction 4/5 and ability to maintain contraction for 10 seconds, 10 repetitions.    Status at eval: PFM 3/5, 3 second hold, 4 repetitions, with substitution  12/11/2020 via sEMG decreased motor recruitment and endurance however able to maintain PFM contraction 10 seconds, min VC     UPDATED GOALS:    Patient will tolerate med dilator, dynamic insertion for 10 minutes (or intercourse) with pain no more than 2/10  Status GW 12/18/56: ZCG EECVWMHOO elicits 9/21 pain, intercourse 4/10 pain (initial and deep)  12/11/2020 Progressing towards goal in management tools and in therapy, has not had intercourse so unable to assess but using pelvic wand as management tool for initial and deep penetration pain; Pain with internal palpation up to 4/10 and pelvic wand 3/10     ASSESSMENT/RECOMMENDATIONS:  [x]Continue therapy per initial plan/protocol at a frequency of  2 x per week for 8 weeks and decreasing to once/week as pt becomes independent and comfortable with Updated HEP and management tools  []Continue therapy with the following recommended changes:_____________________      _____________________________________________________________________  []Discontinue therapy progressing towards or have reached established goals  []Discontinue therapy due to lack of appreciable progress towards goals  []Discontinue therapy due to lack of attendance or compliance  []Await Physician's recommendations/decisions regarding therapy  []Other:________________________________________________________________    Thank you for this referral.   Codey Albright, PT 12/11/2020 11:19 AM  NOTE TO PHYSICIAN:  PLEASE COMPLETE THE ORDERS BELOW AND   FAX TO [facility]: [phone]  If you are unable to process this request in 24 hours please contact our office: [phone]      ? I have read the above report and request that my patient continue as recommended. ? I have read the above report and request that my patient continue therapy with the following changes/special instructions:___________________________________________________________  ? I have read the above report and request that my patient be discharged from therapy.

## 2020-12-14 ENCOUNTER — APPOINTMENT (OUTPATIENT)
Dept: PHYSICAL THERAPY | Age: 25
End: 2020-12-14
Payer: COMMERCIAL

## 2020-12-17 ENCOUNTER — HOSPITAL ENCOUNTER (OUTPATIENT)
Dept: PHYSICAL THERAPY | Age: 25
Discharge: HOME | End: 2020-12-17
Payer: COMMERCIAL

## 2020-12-17 PROCEDURE — 97110 THERAPEUTIC EXERCISES: CPT

## 2020-12-17 PROCEDURE — 97140 MANUAL THERAPY 1/> REGIONS: CPT

## 2020-12-17 NOTE — PROGRESS NOTES
PT DAILY TREATMENT NOTE    Patient Name: Gurdeep Beauchamp  MEOI:  : 1995  [x]  Patient  Verified  Payor: Melville Cranker / Plan: Lupe Loza / Product Type: HMO /    In time:1100  Out time:1145  Total Treatment Time (min): 45  Total Timed Codes (min): 45  1:1 Treatment Time ([facility] only): 39   Visit #: 13 of 30    Treatment Area: Pelvic pain [R10.2]    SUBJECTIVE  Pain Level (0-10 scale): 2/10 pubic symph and right lower glut  Any medication changes, allergies to medications, adverse drug reactions, diagnosis change, or new procedure performed?: [x] No    [] Yes (see summary sheet for update)  Subjective functional status/changes:   [] No changes reported  Patient reports: compliance with current HEP. States she had been feeling really good, then walked a lot for exercise 2 days in a row and then ran a lot of errands Saturday; woke up  morning in pain 4/10 and now 2/10 but still spiking in pubic pain. OBJECTIVE    15 min Therapeutic Exercise:  [x] See flow sheet :   Rationale: increase strength and improve coordination to improve the patients ability to stabilize lumbopelvic girdle    30 min Manual Therapy:  STM: bilat obt internus, piriformis, ITB, adductors, glut med, psoas, iliacus  MET to correct L ant/R post innominate  Ktape SIJ star   Rationale: decrease pain, increase tissue extensibility, decrease trigger points and increase postural awareness to improve the patients ability to decrease pain and restore PLOF            With   [x] TE   [] TA   [] neuro   [] other: Patient Education: [x] Review HEP    [] Progressed/Changed HEP based on:   [] positioning   [] body mechanics   [] transfers   [] heat/ice application    [] other:      Other Objective/Functional Measures: L ant R post innominate     Pain Level (0-10 scale) post treatment: .5    ASSESSMENT/Changes in Function: Patient tolerated today's session well with decreased c/o pain.  Patient demonstrated understanding of today's instruction, education, and recommendations. Patient is progressing appropriately towards goals. Pt demo neutral pelvic landmarks following session    Patient will continue to benefit from skilled PT services to modify and progress therapeutic interventions, address functional mobility deficits, address ROM deficits, address strength deficits, analyze and address soft tissue restrictions, analyze and cue movement patterns, analyze and modify body mechanics/ergonomics and assess and modify postural abnormalities to attain remaining goals. [x]  See Plan of Care  []  See progress note/recertification  []  See Discharge Summary         Progress towards goals / Updated goals:   Short Term Goals: To be accomplished in 4 weeks:  Patient will be able to walk/run 1 mile with decreased pelvic pain, no more than 2/10  Status at eval: patient walk/run VAS pain rating 6/10  11/13/20 walking 1 mile with main at 2/10 in PS and lower abdomen but unable to run 2/2 fear of pain and instability; GOAL MET        Patient will be able to maintain pelvic floor contraction for 10 seconds, 10 repetitions with no accessory substitution or breath holding.   Status at eval: PFMC 3 seconds, 4 repetitions   11/13/20: patient doing kegels BID sitting, reports losing contraction at about 7 seconds, 6 reps; to be assessed next session.   12/11/2020 8 seconds, 10 reps; [address] will be able to perform proper TrA contraction with functional activities ie squat, transfers independently with no VC/TC  Status at eval: patient requires TC/VC to perform TrA contraction in sitting/supine  11/13/20 performing TrA functionally with  independently, no pain  12/11/2020 Performing functional lifts and strengthening with TrA , proper form and no pain; GOAL MET     Long Term Goals: To be accomplished in 8 weeks:  Patient will be able to lift 40# from floor with proper mechanics and no report of pain  Status at eval: Patient unable to lift/exercise without report of pain. 11/13/20 no longer pain with childcare, child 15# however not yet lifting heavier  12/11/2020 Patient lifting 40# with proper form with HEP without any pain and proper form; GOAL MET     Patient will tolerate advanced ADLs including social, housework, and sporting/exercise activities with no abdominal pain or limitation. Status at eval: patient unable to run or perform previous exercise 2/2 pelvic discomfort    11/13/20 generally reporting 0-2/10 pain with ADLs, unable to run 2/2 fear of pain and instability  12/17/2020 pt had pain flare up this weekend following increased levels of exercise/ADLs and activity; resolved to 0-1 following MT and glut strengthening     Patient will demonstrate pelvic floor muscle contraction 4/5 and ability to maintain contraction for 10 seconds, 10 repetitions.    Status at [facility]: PFM 3/5, 3 second hold, 4 repetitions, with substitution  12/11/2020 via sEMG decreased motor recruitment and endurance however able to maintain PFM contraction 10 seconds, min VC     UPDATED GOALS:    Patient will tolerate med dilator, dynamic insertion for 10 minutes (or intercourse) with pain no more than 2/10  Status ZN 94/83/35: GNK XMXMJQMHX elicits 8/27 pain, AREHVOTMVEY 8/15 pain (initial and deep)  12/11/2020 Progressing towards goal in management tools and in therapy, has not had intercourse so unable to assess but using pelvic wand as management tool for initial and deep penetration pain; Pain with internal palpation up to 4/10 and pelvic wand 3/10    PLAN  []  Upgrade activities as tolerated     [x]  Continue plan of care  [x]  Update interventions per flow sheet       []  Discharge due to:_  []  Other:_      Buddy Londono, PT 12/17/2020  11:00 AM    Future Appointments   Date Time Provider Viraj Ramirez   12/24/2020 11:00 AM Moon Alexander, PT [facility]   12/28/2020 12:45 PM Gato To, PT [facility]   12/31/2020  9:30 AM Moon Alexander PT [facility]

## 2020-12-24 ENCOUNTER — HOSPITAL ENCOUNTER (OUTPATIENT)
Dept: PHYSICAL THERAPY | Age: 25
Discharge: HOME | End: 2020-12-24
Payer: COMMERCIAL

## 2020-12-24 PROCEDURE — 97140 MANUAL THERAPY 1/> REGIONS: CPT

## 2020-12-24 PROCEDURE — 97110 THERAPEUTIC EXERCISES: CPT

## 2020-12-24 NOTE — PROGRESS NOTES
PT DAILY TREATMENT NOTE    Patient Name: Cherie Villar  YSVM:  : 1995  [x]  Patient  Verified  Payor: Yfn Land / Plan: Ernesto Galvin / Product Type: HMO /    In time:1105  Out time:1150  Total Treatment Time (min): 45  Total Timed Codes (min): 45  1:1 Treatment Time ( [address] only): 39   Visit #: 14 of 30    Treatment Area: Pelvic pain [R10.2]    SUBJECTIVE  Pain Level (0-10 scale): 2/10  Any medication changes, allergies to medications, adverse drug reactions, diagnosis change, or new procedure performed?: [x] No    [] Yes (see summary sheet for update)  Subjective functional status/changes:   [] No changes reported  Patient reports: compliance with current HEP. States she has had a feeling like \"things were moving backwards\" having feeling of heaviness in pelvic floor and lower     OBJECTIVE    15 min Therapeutic Exercise:  [x] See flow sheet : prolapse group   Rationale: increase strength and decrease pelvic heaviness/pressure to improve the patients ability to manage current complaints      5 min Self Care: Thorough review and handout provided on the following: update for heaviness/pressure management: prolapse group to manage and nightly   Rationale:  Increase awareness and understanding of current condition to improve patients ability to independently and effectively attain goals and progress towards long term management of current condition.      25 min Manual Therapy:  STM: bilat gluts, adductors, piriformis, psoas, iliacus  Visceral mob: bladder, uterus, clay pouch   Rationale: increase tissue extensibility and decrease trigger points to improve the patients ability to decrease tension and tenderness and restore PLOF    With   [x] TE   [] TA   [] neuro   [] other: Patient Education: [x] Review HEP    [] Progressed/Changed HEP based on:   [] positioning   [] body mechanics   [] transfers   [] heat/ice application    [] other:         Pain Level (0-10 scale) post treatment: 0    ASSESSMENT/Changes in Function: Patient tolerated today's session well with decreased c/o pain. Patient demonstrated understanding of today's instruction, education, and recommendations. Patient is progressing appropriately towards goals. Patient will benefit from nightly hip elevation and lumbopelvic stabilization to manage pelvic heaviness/pressure    Patient will continue to benefit from skilled PT services to modify and progress therapeutic interventions, address functional mobility deficits, address ROM deficits, address strength deficits, analyze and address soft tissue restrictions, analyze and cue movement patterns, analyze and modify body mechanics/ergonomics and assess and modify postural abnormalities to attain remaining goals. [x]  See Plan of Care  []  See progress note/recertification  []  See Discharge Summary         Progress towards goals / Updated goals:Patient will be able to maintain pelvic floor contraction for 10 seconds, 10 repetitions with no accessory substitution or breath holding. Status at [facility]: [facility] 3 seconds, 4 repetitions   11/13/20: patient doing kegels BID sitting, reports losing contraction at about 7 seconds, 6 reps; to be assessed next session.   12/11/2020 8 seconds, 10 reps; [address]     Patient will tolerate advanced ADLs including social, housework, and sporting/exercise activities with no abdominal pain or limitation. Status at eval: patient unable to run or perform previous exercise 2/2 pelvic discomfort    11/13/20 generally reporting 0-2/10 pain with ADLs, unable to run 2/2 fear of pain and instability  12/17/2020 pt had pain flare up this weekend following increased levels of exercise/ADLs and activity; resolved to 0-1 following MT and glut strengthening     Patient will demonstrate pelvic floor muscle contraction 4/5 and ability to maintain contraction for 10 seconds, 10 repetitions.    Status at eval: PFM 3/5, 3 second hold, 4 repetitions, with substitution  12/11/2020 via sEMG decreased motor recruitment and endurance however able to maintain PFM contraction 10 seconds, min VC     UPDATED GOALS:    Patient will tolerate med dilator, dynamic insertion for 10 minutes (or intercourse) with pain no more than 2/10  Status IA 46/94/97: TIY BHFHSCTDC elicits 3/33 pain, VIMTWZRRRBY 6/84 pain (initial and deep)  12/11/2020 Progressing towards goal in management tools and in therapy, has not had intercourse so unable to assess but using pelvic wand as management tool for initial and deep penetration pain; Pain with internal palpation up to 4/10 and pelvic wand 3/10    PLAN  []  Upgrade activities as tolerated     [x]  Continue plan of care  [x]  Update interventions per flow sheet       []  Discharge due to:_  []  Other:_      Buddy Londono, PT 12/24/2020  11:07 AM    Future Appointments   Date Time Provider Viraj Ramirez   12/28/2020 12:45 PM Gato To, PT [facility]   12/31/2020  9:30 AM Moon Alexander, PT [facility]

## 2020-12-28 ENCOUNTER — HOSPITAL ENCOUNTER (OUTPATIENT)
Dept: PHYSICAL THERAPY | Age: 25
Discharge: HOME | End: 2020-12-28
Payer: COMMERCIAL

## 2020-12-28 PROCEDURE — 97530 THERAPEUTIC ACTIVITIES: CPT

## 2020-12-28 PROCEDURE — 97110 THERAPEUTIC EXERCISES: CPT

## 2020-12-28 NOTE — PROGRESS NOTES
PT DAILY TREATMENT NOTE    Patient Name: Misty Bustillo  YKB:  : 1995  [x]  Patient  Verified  Payor: Luis Preciado / Plan: [employer] / Product Type: HMO /    In time:1250  Out time:130  Total Treatment Time (min): 45  Total Timed Codes (min): 45  1:1 Treatment Time ( [address] only): 39   Visit #: 15 of 30    Treatment Area: Pelvic pain [R10.2]    SUBJECTIVE  Pain Level (0-10 scale): 10  Any medication changes, allergies to medications, adverse drug reactions, diagnosis change, or new procedure performed?: [x] No    [] Yes (see summary sheet for update)  Subjective functional status/changes:   [] No changes reported  Patient reports: compliance with current HEP. States she has not had any of the pelvic floor \"dropping\" sensation since last session; slept on a friends bed this weekend and bed was too soft, now feeling like \"hips are off\"     OBJECTIVE    30 min Therapeutic Exercise:  [x] See flow sheet :   Rationale: increase strength, improve coordination and increase proprioception to improve the patients ability to restore PLOF and decrease pain      15 min Therapeutic Activity:  [x]  See flow sheet :  Ktape for SIJ stability  RDL to simulate proper lifting/childcare   Rationale: increase strength and improve coordination  to improve the patients ability to tolerate ADLs with proper form, less pain and dysfunction        With   [] TE   [] TA   [] neuro   [] other: Patient Education: [x] Review HEP    [] Progressed/Changed HEP based on:   [] positioning   [] body mechanics   [] transfers   [] heat/ice application    [] other:      Other Objective/Functional Measures:  Left anterior right post innominate     Pain Level (0-10 scale) post treatment: 1/10    ASSESSMENT/Changes in Function: Patient tolerated today's session well with decreased c/o pain. Patient demonstrated understanding of today's instruction, education, and recommendations. Patient is progressing appropriately towards goals. Pt demo improved pelvic landmarks following MET and maintained with todays interventions    Patient will continue to benefit from skilled PT services to modify and progress therapeutic interventions, address functional mobility deficits, address ROM deficits, address strength deficits, analyze and address soft tissue restrictions, analyze and cue movement patterns, analyze and modify body mechanics/ergonomics and assess and modify postural abnormalities to attain remaining goals. [x]  See Plan of Care  []  See progress note/recertification  []  See Discharge Summary         Progress towards goals / Updated goals:  Patient will be able to maintain pelvic floor contraction for 10 seconds, 10 repetitions with no accessory substitution or breath holding. Status at [facility]: [facility] 3 seconds, 4 repetitions   11/13/20: patient doing kegels BID sitting, reports losing contraction at about 7 seconds, 6 reps; to be assessed next session.   12/11/2020 8 seconds, 10 reps; [address]     Patient will tolerate advanced ADLs including social, housework, and sporting/exercise activities with no abdominal pain or limitation. Status at [facility]: patient unable to run or perform previous exercise 2/2 pelvic discomfort    11/13/20 generally reporting 0-2/10 pain with ADLs, unable to run 2/2 fear of pain and instability  12/17/2020 pt had pain flare up this weekend following increased levels of exercise/ADLs and activity; resolved to 0-1 following MT and glut strengthening     Patient will demonstrate pelvic floor muscle contraction 4/5 and ability to maintain contraction for 10 seconds, 10 repetitions.    Status at [facility]: PFM 3/5, 3 second hold, 4 repetitions, with substitution  12/11/2020 via sEMG decreased motor recruitment and endurance however able to maintain PFM contraction 10 seconds, min VC     UPDATED GOALS:    Patient will tolerate med dilator, dynamic insertion for 10 minutes (or intercourse) with pain no more than 2/10  Status  34/88/17: XSF BIXNGXWEZ elicits 9/79 pain, IWGJOBMXPGA 3/84 pain (initial and deep)  12/11/2020 Progressing towards goal in management tools and in therapy, has not had intercourse so unable to assess but using pelvic wand as management tool for initial and deep penetration pain; Pain with internal palpation up to 4/10 and pelvic wand 3/10    PLAN  []  Upgrade activities as tolerated     [x]  Continue plan of care  [x]  Update interventions per flow sheet       []  Discharge due to:_  []  Other:_  Internal/pelvic pain goals, left deep glut pain    Zoran Brantley, PT 12/28/2020  1:02 PM    Future Appointments   Date Time Provider Viraj Ramirez   12/31/2020  9:30 AM Joesph To, PT [facility]   1/4/2021 12:45 PM Joesph To, PT [facility]   1/11/2021 12:45 PM Joesph To, PT [facility]   1/19/2021 12:45 PM Myrna Lora, PT [facility]

## 2020-12-31 ENCOUNTER — HOSPITAL ENCOUNTER (OUTPATIENT)
Dept: PHYSICAL THERAPY | Age: 25
Discharge: HOME | End: 2020-12-31
Payer: COMMERCIAL

## 2020-12-31 PROCEDURE — 97140 MANUAL THERAPY 1/> REGIONS: CPT

## 2020-12-31 PROCEDURE — 97110 THERAPEUTIC EXERCISES: CPT

## 2020-12-31 NOTE — PROGRESS NOTES
PT DAILY TREATMENT NOTE    Patient Name: Deann Laguerre  FTNL:  : 1995  [x]  Patient  Verified  Payor: Brandyn Nair / Plan: Carol Villalobos / Product Type: HMO /    In time:930  Out time:101  Total Treatment Time (min): 45  Total Timed Codes (min): 45  1:1 Treatment Time ( [address] only): 39   Visit #: 16 of 30    Treatment Area: Pelvic pain [R10.2]    SUBJECTIVE  Pain Level (0-10 scale): 3-4/10 right glut  Any medication changes, allergies to medications, adverse drug reactions, diagnosis change, or new procedure performed?: [x] No    [] Yes (see summary sheet for update)  Subjective functional status/changes:   [] No changes reported  Patient reports:  compliance with current HEP. States she is still having right glut/OI pain, was able to decrease it with wand but then within an hour would come back. OBJECTIVE    10 min Therapeutic Exercise:  [x] See flow sheet :   Rationale: increase ROM and increase strength to improve the patients ability to manage pain and improve stability      35 min Manual Therapy:  Sacrotuberous ligament supine with hip flex, IR/ER; external obt internus STM sidelying bilat, figure 4  Internal STM/MFR throughout PFM primarily obt internus   Rationale: decrease pain, increase tissue extensibility, decrease trigger points and increase postural awareness to improve the patients ability to restore PLOF             With   [x] TE   [] TA   [] neuro   [] other: Patient Education: [x] Review HEP    [] Progressed/Changed HEP based on:   [] positioning   [] body mechanics   [] transfers   [] heat/ice application    [] other:         Pain Level (0-10 scale) post treatment:  .5    ASSESSMENT/Changes in Function: Patient tolerated today's session well with decreased c/o pain. Patient demonstrated understanding of today's instruction, education, and recommendations. Patient is progressing appropriately towards goals.      Patient will continue to benefit from skilled PT services to modify and progress therapeutic interventions, address functional mobility deficits, address ROM deficits, address strength deficits, analyze and address soft tissue restrictions, analyze and cue movement patterns, analyze and modify body mechanics/ergonomics and assess and modify postural abnormalities to attain remaining goals. [x]  See Plan of Care  []  See progress note/recertification  []  See Discharge Summary         Progress towards goals / Updated goals:  Patient will be able to maintain pelvic floor contraction for 10 seconds, 10 repetitions with no accessory substitution or breath holding. Status at eval: [facility]MC 3 seconds, 4 repetitions   11/13/20: patient doing kegels BID sitting, reports losing contraction at about 7 seconds, 6 reps; to be assessed next session.   12/11/2020 8 seconds, 10 reps; [address]     Patient will tolerate advanced ADLs including social, housework, and sporting/exercise activities with no abdominal pain or limitation. Status at [facility]: patient unable to run or perform previous exercise 2/2 pelvic discomfort    11/13/20 generally reporting 0-2/10 pain with ADLs, unable to run 2/2 fear of pain and instability  12/17/2020 pt had pain flare up this weekend following increased levels of exercise/ADLs and activity; resolved to 0-1 following MT and glut strengthening     Patient will demonstrate pelvic floor muscle contraction 4/5 and ability to maintain contraction for 10 seconds, 10 repetitions.    Status at [facility]: PFM 3/5, 3 second hold, 4 repetitions, with substitution  12/11/2020 via sEMG decreased motor recruitment and endurance however able to maintain PFM contraction 10 seconds, min VC     UPDATED GOALS:    Patient will tolerate med dilator, dynamic insertion for 10 minutes (or intercourse) with pain no more than 2/10  Status JE 79/60/56: OZF EUNWHLFDN elicits 7/29 pain, EEKTEIRVLRH 9/65 pain (initial and deep)  12/11/2020 Progressing towards goal in management tools and in therapy, has not had intercourse so unable to assess but using pelvic wand as management tool for initial and deep penetration pain; Pain with internal palpation up to 4/10 and pelvic wand 3/10  12/31/2020 continue to work on pelvic pain; patient reports decreased pain with intercourse       PLAN  []  Upgrade activities as tolerated     [x]  Continue plan of care  [x]  Update interventions per flow sheet       []  Discharge due to:_  []  Other:_      Padmaja Wang, PT 12/31/2020  9:37 AM    Future Appointments   Date Time Provider Viraj Ramirez   1/4/2021 12:45 PM Schuyler To, PT [facility]   1/11/2021 12:45 PM Schuyler To, PT [facility]   1/19/2021 12:45 PM Schuyler To, PT [facility]

## 2021-01-04 ENCOUNTER — HOSPITAL ENCOUNTER (OUTPATIENT)
Dept: PHYSICAL THERAPY | Age: 26
Discharge: HOME | End: 2021-01-04
Payer: COMMERCIAL

## 2021-01-04 PROCEDURE — 97140 MANUAL THERAPY 1/> REGIONS: CPT

## 2021-01-04 NOTE — PROGRESS NOTES
PT DAILY TREATMENT NOTE    Patient Name: Katlin Almaraz  Date:2021  : 1995  [x]  Patient  Verified  Payor: Anam Paula / Plan: Leighton Figueroa / Product Type: HMO /    In time:1245  Out time:130  Total Treatment Time (min): 45  Total Timed Codes (min): 40  1:1 Treatment Time ([facility] only): 39   Visit #: 17 of 30    Treatment Area: Pelvic pain [R10.2]    SUBJECTIVE  Pain Level (0-10 scale): 1/10  Any medication changes, allergies to medications, adverse drug reactions, diagnosis change, or new procedure performed?: [x] No    [] Yes (see summary sheet for update)  Subjective functional status/changes:   [] No changes reported  Patient reports:  compliance with current HEP. States she felt good all weekend, 0-1/10 pain but last night at work prolonged sitting caused pelvic pain/irritation minimally. OBJECTIVE    40 min Manual Therapy:  Sacrotuberous ligament supine with hip flex, IR/ER; external obt internus STM sidelying bilat, figure 4  Internal STM/MFR throughout PFM primarily obt internus   Rationale: decrease pain, increase tissue extensibility and decrease trigger points to improve the patients ability to manage pain and restore PLOF          With   [] TE   [] TA   [] neuro   [] other: Patient Education: [x] Review HEP    [] Progressed/Changed HEP based on:   [] positioning   [] body mechanics   [] transfers   [] heat/ice application    [] other:        Pain Level (0-10 scale) post treatment: 0    ASSESSMENT/Changes in Function: Patient tolerated today's session well with decreased c/o pain. Patient demonstrated understanding of today's instruction, education, and recommendations. Patient is progressing appropriately towards goals.  Patient demo improve pelvic ROM bilaterally following STM    Patient will continue to benefit from skilled PT services to modify and progress therapeutic interventions, address functional mobility deficits, address ROM deficits, address strength deficits, analyze and address soft tissue restrictions, analyze and cue movement patterns, analyze and modify body mechanics/ergonomics and assess and modify postural abnormalities to attain remaining goals. [x]  See Plan of Care  []  See progress note/recertification  []  See Discharge Summary         Progress towards goals / Updated goals:  Patient will be able to maintain pelvic floor contraction for 10 seconds, 10 repetitions with no accessory substitution or breath holding. Status at eval: [facility] 3 seconds, 4 repetitions   11/13/20: patient doing kegels BID sitting, reports losing contraction at about 7 seconds, 6 reps; to be assessed next session.   12/11/2020 8 seconds, 10 reps; [address]     Patient will tolerate advanced ADLs including social, housework, and sporting/exercise activities with no abdominal pain or limitation. Status at eval: patient unable to run or perform previous exercise 2/2 pelvic discomfort    11/13/20 generally reporting 0-2/10 pain with ADLs, unable to run 2/2 fear of pain and instability  12/17/2020 pt had pain flare up this weekend following increased levels of exercise/ADLs and activity; resolved to 0-1 following MT and glut strengthening     Patient will demonstrate pelvic floor muscle contraction 4/5 and ability to maintain contraction for 10 seconds, 10 repetitions.    Status at [facility]: PFM 3/5, 3 second hold, 4 repetitions, with substitution  12/11/2020 via sEMG decreased motor recruitment and endurance however able to maintain PFM contraction 10 seconds, min VC     UPDATED GOALS:    Patient will tolerate med dilator, dynamic insertion for 10 minutes (or intercourse) with pain no more than 2/10  Status SQ 13/21/13: XXO IKMDEZGCP elicits 7/83 pain, EMUDBFEKTXD 2/83 pain (initial and deep)  12/11/2020 Progressing towards goal in management tools and in therapy, has not had intercourse so unable to assess but using pelvic wand as management tool for initial and deep penetration pain; Pain with internal palpation up to 4/10 and pelvic wand 3/10  12/31/2020 continue to work on pelvic pain; patient reports decreased pain with intercourse       PLAN  []  Upgrade activities as tolerated     [x]  Continue plan of care  [x]  Update interventions per flow sheet       []  Discharge due to:_  []  Other:_  prog next session    Melvin Tidwell, PT 1/4/2021  12:55 PM    Future Appointments   Date Time Provider Viraj Ramirez   1/11/2021 12:45 PM Renee To, PT [facility]   1/19/2021 12:45 PM Rafiq Ervin, PT [facility]

## 2021-01-11 ENCOUNTER — APPOINTMENT (OUTPATIENT)
Dept: PHYSICAL THERAPY | Age: 26
End: 2021-01-11
Payer: COMMERCIAL

## 2021-01-19 ENCOUNTER — HOSPITAL ENCOUNTER (OUTPATIENT)
Dept: PHYSICAL THERAPY | Age: 26
Discharge: HOME | End: 2021-01-19
Payer: COMMERCIAL

## 2021-01-19 PROCEDURE — 97535 SELF CARE MNGMENT TRAINING: CPT

## 2021-01-19 PROCEDURE — 97112 NEUROMUSCULAR REEDUCATION: CPT

## 2021-01-19 PROCEDURE — 97140 MANUAL THERAPY 1/> REGIONS: CPT

## 2021-01-19 NOTE — PROGRESS NOTES
[facility]  [address], [address]  Ph [phone]  Fx [phone]    Pelvic Floor Progress Note  Patient name: Jeremy Bobby Start of Care: 10/20/2020   Referral source: Macario Zuniga CNM : 1995   Medical/Treatment Diagnosis: Pelvic pain [R10.2] Onset Date:recent pregnancy, worsening since 2020 delivery     Prior Hospitalization: see medical history Provider#: [id number]   Medications: Verified on Patient Summary List    Comorbidities: July 15, 2020 vaginal delivery; tearing no known grade \"minor\", breastfeeding, heart palpitations   Prior Level of Function:  prior to pregnancy, no pelvic pain; functional with ADLs and IADLs     Visits from Start of Care: 18    Missed Visits: 2    Established Goals:           Excellent Good         Limited           None  [x] Increase Pelvic MM strength       []  [x]  []  []  [x] Decrease Pelvic MM hypertonus     []  [x]  []  []  [] Decrease Incontinence Episodes    []  []  []  []   [] Improve Voiding Habits        []  []  []  []   [] Decreased Urgency        []  []  []  []  [x] Decrease Pelvic Pain        []  [x]  []  []    Key Functional Changes: Patient progressing appropriately towards functional goals and now with 0-1/10 pelvic pain throughout the day, with ADLs and childcare activities. Patient compliant with current HEP and self-management tools. Patient however still having pain with intercourse and fearful of return to previous level of activity and exacerbation of current condition, s/s. Updated Goals: to be achieved in 8 weeks:   Patient will be able to maintain pelvic floor contraction for 10 seconds, 10 repetitions with no accessory substitution or breath holding.   Status at eval: PF 3 seconds, 4 repetitions   2021 PERF: 3+/6/10//5; PROGRESSING TOWARDS GOAL     Patient will tolerate advanced ADLs including social, housework, and sporting/exercise activities with no abdominal pain or limitation. Status at eval: patient unable to run or perform previous exercise 2/2 pelvic discomfort    1/19/2021 daily 0-1/10 pain with ADLs and now compliant with daily stretches and HEP for management of pain; GOAL MOSTLY MET      Patient will demonstrate pelvic floor muscle contraction 4/5 and ability to maintain contraction for 10 seconds, 10 repetitions. Status at eval: PFM 3/5, 3 second hold, 4 repetitions, with substitution  1/19/2021 PERF: 3+/6/10//5; PROGRESSING TOWARDS GOAL     UPDATED GOALS:    Patient will tolerate med dilator, dynamic insertion for 10 minutes (or intercourse) with pain no more than 2/10  Status TY 44/48/10: ETC TFOEGLDVK elicits 4/58 pain, IIANYAEZYDT 9/87 pain (initial and deep)  1/19/2021 [place] pain 3/10 with initial and deep penetration; PROGRESSiNG TOWARDS GOAL    Patient will demonstrate independence with management tools and exercise program that are beneficial for current condition in order to feel comfortable with PFPT D/C and not fear exacerbation of current condition or s/s returning.    Status 1/19/2021 : patient still fearful of return to exercise outside of current HEP and unaware of what activities to avoid to avoid exacerbation of current condition and symptoms    ASSESSMENT/RECOMMENDATIONS:  [x]Continue therapy per initial plan/protocol at a frequency of  1 x per week for 8 weeks, decreasing frequency while working towards self management and HEP progressions  []Continue therapy with the following recommended changes:_____________________      _____________________________________________________________________  []Discontinue therapy progressing towards or have reached established goals  []Discontinue therapy due to lack of appreciable progress towards goals  []Discontinue therapy due to lack of attendance or compliance  []Await Physician's recommendations/decisions regarding therapy  []Other:________________________________________________________________    Thank you for this referral.   Deisy Moreland, PT 1/19/2021 1:38 PM  NOTE TO PHYSICIAN:  PLEASE COMPLETE THE ORDERS BELOW AND   FAX TO [facility]: [phone]  If you are unable to process this request in 24 hours please contact our office: [phone]      ? I have read the above report and request that my patient continue as recommended. ? I have read the above report and request that my patient continue therapy with the following changes/special instructions:___________________________________________________________  ? I have read the above report and request that my patient be discharged from therapy.

## 2021-01-19 NOTE — PROGRESS NOTES
PT DAILY TREATMENT NOTE    Patient Name: Steff Early  Date:2021  : 1995  [x]  Patient  Verified  Payor: Johanna Zapata / Plan: Arcenio Hamilton / Product Type: HMO /    In time:1245  Out time:130  Total Treatment Time (min): 45  Total Timed Codes (min): 45  1:1 Treatment Time ( [address] only): 39   Visit #: 18 of 30    Treatment Area: Pelvic pain [R10.2]    SUBJECTIVE  Pain Level (0-10 scale): 2/10  Any medication changes, allergies to medications, adverse drug reactions, diagnosis change, or new procedure performed?: [x] No    [] Yes (see summary sheet for update)  Subjective functional status/changes:   [] No changes reported  Patient reports: compliance with current HEP. States she has been doing exercises \"religiously\" and pain remained 0-1/10 for a week and has slowly started to creep up. Returns after 2 weeks 2/2 COVID exposure, tested negative. See prog     OBJECTIVE    15 min Self Care: Thorough review and handout provided on the following: vaginal lubricants and hydration recommendations , nightly coconut oil  Provided with Med dilator, instructions reviewed and provided   Rationale:  Increase awareness and understanding of current condition to improve patients ability to independently and effectively attain goals and progress towards long term management of current condition.      10 min Neuromuscular Re-education:  [x]  See flow sheet : PFM downtraining  Contract/relax/pushaway with exhale  Demo decreased ROM however improving with successive reps   Rationale: increase ROM, improve coordination and increase proprioception  to improve the patients ability to decrease pelvic floor tone and tension    20 min Manual Therapy:  External obt internus release bilat  Deep PFM and OI STM/MFR  MET for Left ant innominate/R post innominate   Rationale: increase ROM, increase tissue extensibility and decrease trigger points to improve the patients ability to progress towards updated and functional goals      With   [] TE   [] TA   [x] neuro   [x] other: Patient Education: [x] Review HEP    [] Progressed/Changed HEP based on:   [] positioning   [] body mechanics   [] transfers   [] heat/ice application    [] other:      Other Objective/Functional Measures: PERF: 3+/6/10//5     Pain Level (0-10 scale) post treatment: 0.5    ASSESSMENT/Changes in Function: Patient tolerated today's session well with no c/o pain. Patient demonstrated understanding of today's instruction, education, and recommendations. Patient is progressing appropriately towards goals. Patient demo delayed and imcomplete relaxation following pelvic floor contraction. Pt demo neutral landmarks following MT. Patient will continue to benefit from skilled PT services to modify and progress therapeutic interventions, address functional mobility deficits, address ROM deficits, address strength deficits, analyze and address soft tissue restrictions, analyze and cue movement patterns, analyze and modify body mechanics/ergonomics and assess and modify postural abnormalities to attain remaining goals. []  See Plan of Care  [x]  See progress note/recertification  []  See Discharge Summary         Progress towards goals / Updated goals:  Patient will be able to maintain pelvic floor contraction for 10 seconds, 10 repetitions with no accessory substitution or breath holding. [place] [facility] [facility] [place] seconds, 4 repetitions   1/19/2021 PERF: 3+/6/10//5; PROGRESSING TOWARDS GOAL     Patient will tolerate advanced ADLs including social, housework, and sporting/exercise activities with no abdominal pain or limitation.    Status at [facility]: patient unable to run or perform previous exercise 2/2 pelvic discomfort    1/19/2021 daily 0-1/10 pain with ADLs and now compliant with daily stretches and HEP for management of pain; GOAL MOSTLY MET      Patient will demonstrate pelvic floor muscle contraction 4/5 and ability to maintain contraction for 10 seconds, 10 repetitions. Status at eval: PFM 3/5, 3 second hold, 4 repetitions, with substitution  1/19/2021 PERF: 3+/6/10//5; PROGRESSING TOWARDS GOAL     UPDATED GOALS:    Patient will tolerate med dilator, dynamic insertion for 10 minutes (or intercourse) with pain no more than 2/10  Status DH 05/52/47: MIN KOGYLWHRL elicits 7/26 pain, DEMNQFBBLCA 3/45 pain (initial and deep)  1/19/2021 [place] pain 3/10 with initial and deep penetration; PROGRESSiNG TOWARDS GOAL    Patient will demonstrate independence with management tools and exercise program that are beneficial for current condition in order to feel comfortable with PFPT D/C and not fear exacerbation of current condition or s/s returning. Status 1/19/2021 : patient still fearful of return to exercise outside of current HEP and unaware of what activities to avoid to avoid exacerbation of current condition and symptoms    PLAN  []  Upgrade activities as tolerated     [x]  Continue plan of care  [x]  Update interventions per flow sheet       []  Discharge due to:_  []  Other:_      Kathy Rendon, PT 1/19/2021  12:48 PM    No future appointments.

## 2021-02-12 ENCOUNTER — HOSPITAL ENCOUNTER (OUTPATIENT)
Dept: PHYSICAL THERAPY | Age: 26
Discharge: HOME | End: 2021-02-12
Payer: COMMERCIAL

## 2021-02-12 PROCEDURE — 97140 MANUAL THERAPY 1/> REGIONS: CPT

## 2021-02-12 NOTE — PROGRESS NOTES
PT DAILY TREATMENT NOTE    Patient Name: Antonietta Goodpasture  Date:2021  : 1995  [x]  Patient  Verified  Payor: Malini Hernandez / Plan: Eric Rodriguez / Product Type: HMO /    In time:815  Out time:845  Total Treatment Time (min): 30  Total Timed Codes (min): 30  1:1 Treatment Time ([facility] only): 30  Visit #: 19 of 30    Treatment Area: Pelvic pain [R10.2]    SUBJECTIVE  Pain Level (0-10 scale): 0  Any medication changes, allergies to medications, adverse drug reactions, diagnosis change, or new procedure performed?: [x] No    [] Yes (see summary sheet for update)  Subjective functional status/changes:   [] No changes reported  Patient reports: compliance with current HEP. States she has had 0- 0.5/10 pain over the last 2 weeks. Has been using dilator with notable improvement in tolerance however not had intercourse. Still fearful of returning to [employer] workouts. Patient arrives 15 minutes late to session. OBJECTIVE    30 min Manual Therapy:  MFR superficial PFM, prolonged holds  Introitus: 1 dimensional stretch and gentle STM/prolonged holds with contract/relax/pushawa   Rationale: decrease pain, increase ROM, increase tissue extensibility and decrease trigger points to improve the patients ability to decrease pain with intercourse and insertion of speculum at annual              With   [] TE   [] TA   [] neuro   [x] other: Patient Education: [x] Review HEP    [] Progressed/Changed HEP based on:   [] positioning   [] body mechanics   [] transfers   [] heat/ice application    [] other:      Other Objective/Functional Measures: improved PFM tone upin initial insertion     Pain Level (0-10 scale) post treatment: 0    ASSESSMENT/Changes in Function: Patient tolerated today's session well with no c/o pain. Patient demonstrated understanding of today's instruction, education, and recommendations. Patient is progressing appropriately towards goals.      Patient will continue to benefit from skilled PT services to modify and progress therapeutic interventions, address functional mobility deficits, address ROM deficits, address strength deficits, analyze and address soft tissue restrictions, analyze and cue movement patterns, analyze and modify body mechanics/ergonomics and assess and modify postural abnormalities to attain remaining goals. [x]  See Plan of Care  []  See progress note/recertification  []  See Discharge Summary         Progress towards goals / Updated goals:  Patient will be able to maintain pelvic floor contraction for 10 seconds, 10 repetitions with no accessory substitution or breath holding. Status at eval: [facility] 3 seconds, 4 repetitions   1/19/2021 PERF: 3+/6/10//5; PROGRESSING TOWARDS GOAL     Patient will tolerate advanced ADLs including social, housework, and sporting/exercise activities with no abdominal pain or limitation. Status at eval: patient unable to run or perform previous exercise 2/2 pelvic discomfort    1/19/2021 daily 0-1/10 pain with ADLs and now compliant with daily stretches and HEP for management of pain; GOAL MOSTLY MET      Patient will demonstrate pelvic floor muscle contraction 4/5 and ability to maintain contraction for 10 seconds, 10 repetitions. Status at eval: PFM 3/5, 3 second hold, 4 repetitions, with substitution  1/19/2021 PERF: 3+/6/10//5; PROGRESSING TOWARDS GOAL     UPDATED GOALS:    Patient will tolerate med dilator, dynamic insertion for 10 minutes (or intercourse) with pain no more than 2/10  Status [place] 78/40/21: STB JYHONAVOG elicits 4/40 pain, ZMTQUYFBIXX 8/38 pain (initial and deep)  2/12/2021 patient using dilator 3 times/week 10-15 minutes, pain generally 0-4/10 and noting less tightness/tenderness with each session     Patient will demonstrate independence with management tools and exercise program that are beneficial for current condition in order to feel comfortable with PFPT D/C and not fear exacerbation of current condition or s/s returning. Status 1/19/2021 : patient still fearful of return to exercise outside of current HEP and unaware of what activities to avoid to avoid exacerbation of current condition and symptoms    PLAN  []  Upgrade activities as tolerated     [x]  Continue plan of care  [x]  Update interventions per flow sheet       []  Discharge due to:_  []  Other:_  prog next session; next session: ladder drills and HIIT    Shelliethomas Norris, PT 2/12/2021  8:07 AM    Future Appointments   Date Time Provider Viraj Ramirez   2/18/2021  9:30 AM Mitzi To, PT [facility]   2/26/2021  9:30 AM Lindsay Ojeda, PT [facility]

## 2021-02-18 ENCOUNTER — HOSPITAL ENCOUNTER (OUTPATIENT)
Dept: PHYSICAL THERAPY | Age: 26
Discharge: HOME | End: 2021-02-18
Payer: COMMERCIAL

## 2021-02-18 PROCEDURE — 97110 THERAPEUTIC EXERCISES: CPT

## 2021-02-18 PROCEDURE — 97530 THERAPEUTIC ACTIVITIES: CPT

## 2021-02-18 NOTE — PROGRESS NOTES
PT DAILY TREATMENT NOTE    Patient Name: Steff Early  [id number]  : 1995  [x]  Patient  Verified  Payor: Johanna Zapata / Plan: Arcenio Hamilton / Product Type: HMO /    In time:1245  Out time:130  Total Treatment Time (min): 45  Total Timed Codes (min): 45  1:1 Treatment Time ( [address] only): 39   Visit #: 20 of 30    Treatment Area: Pelvic pain [R10.2]    SUBJECTIVE  Pain Level (0-10 scale): 0  Any medication changes, allergies to medications, adverse drug reactions, diagnosis change, or new procedure performed?: [x] No    [] Yes (see summary sheet for update)  Subjective functional status/changes:   [] No changes reported  Patient reports: compliance with current HEP. States she has had no pain recently; would like to review some HIIT progressions for return to previous function. OBJECTIVE    35 min Therapeutic Exercise:  [x] See flow sheet :   Rationale: increase strength, improve coordination and increase proprioception to improve the patients ability to return to previous level of exercise       10 min Therapeutic Activity:  [x]  See flow sheet : ed on review to running and exercise; interval and s/s monitoring reviewed   Rationale: increase proprioception  to improve the patients ability to return to sport appropriately without increase in pain or risk of injury             With   [] TE   [x] TA   [] neuro   [] other: Patient Education: [x] Review HEP    [] Progressed/Changed HEP based on:   [] positioning   [] body mechanics   [] transfers   [] heat/ice application    [] other:         Pain Level (0-10 scale) post treatment: 0    ASSESSMENT/Changes in Function: Patient tolerated today's session well with no c/o pain. Patient demonstrated understanding of today's instruction, education, and recommendations. Patient is progressing appropriately towards goals.  Patient demos dynamic valgus on R LE with squats and lateral movements; improved with cue    Patient will continue to benefit from skilled PT services to modify and progress therapeutic interventions, address functional mobility deficits, address ROM deficits, address strength deficits, analyze and address soft tissue restrictions, analyze and cue movement patterns, analyze and modify body mechanics/ergonomics and assess and modify postural abnormalities to attain remaining goals. [x]  See Plan of Care  []  See progress note/recertification  []  See Discharge Summary         Progress towards goals / Updated goals:  Patient will be able to maintain pelvic floor contraction for 10 seconds, 10 repetitions with no accessory substitution or breath holding. Status at eval: [facility] 3 seconds, 4 repetitions   2/18/2021  PERF: 3+/8-9/10//10; GOAL MOSTLY MET     Patient will tolerate advanced ADLs including social, housework, and sporting/exercise activities with no abdominal pain or limitation. Status at eval: patient unable to run or perform previous exercise 2/2 pelvic discomfort     2/18/2021 Patient reports generally 0/10 pain throughout the day and if pain does occur, reports at 0.5/10 and using management tools/exercises to resolve; does not feel limited functionally throughout the day 2/2 pain. GOAL MET     Patient will demonstrate pelvic floor muscle contraction 4/5 and ability to maintain contraction for 10 seconds, 10 repetitions.    Status at eval: PFM 3/5, 3 second hold, 4 repetitions, with substitution  2/18/2021  PERF: 3+/8-9/10//10; GOAL MOSTLY MET     UPDATED GOALS:    Patient will tolerate med dilator, dynamic insertion for 10 minutes (or intercourse) with pain no more than 2/10  Status PY 18/73/42: SZN USXMGGNOO elicits 7/76 pain, PCXAMGCGMUQ 8/34 pain (initial and deep)  2/18/2021 Pain ranging 0-2 with dilator and intercourse; GOAL MET     Patient will demonstrate independence with management tools and exercise program that are beneficial for current condition in order to feel comfortable with PFPT D/C and not fear exacerbation of current condition or s/s returning.    Status 1/19/2021 : patient still fearful of return to exercise outside of current HEP and unaware of what activities to avoid to avoid exacerbation of current condition and symptoms  2/18/2021 Intro to dynamic exercises and plyometics today; patient plan to return to gentle jogging and previous exercise routine prior to DC; if all tolerated and appropriate, plan to DC in next 1-4 weeks; [address]  []  Upgrade activities as tolerated     [x]  Continue plan of care  [x]  Update interventions per flow sheet       []  Discharge due to:_  []  Other:_ core progressions and DC plan     Nelda Blevins PT 2/18/2021  12:45 PM    Future Appointments   Date Time Provider Viraj Ramirez   2/26/2021  9:30 AM Martin Carcamo, DENIZ [facility]

## 2021-02-18 NOTE — PROGRESS NOTES
[facility]  [address] [place], [address] [facility] [place]  Ph [phone]  Fx [phone]    Pelvic Floor Progress Note  Patient name: David Hunter Start of Care: 10/20/2020   Referral source: Elsie Ibrahim CNM : 1995   Medical/Treatment Diagnosis: Pelvic pain [R10.2] Onset Date:recent pregnancy, worsening since 2020 delivery     Prior Hospitalization: see medical history Provider#: [id number]   Medications: Verified on Patient Summary List    Comorbidities: July 15, 2020 vaginal delivery; tearing no known grade \"minor\", breastfeeding, heart palpitations   Prior Level of Function:  prior to pregnancy, no pelvic pain; functional with ADLs and IADLs     Visits from Start of Care: 20    Missed Visits: 2    Established Goals:           Excellent Good         Limited           None  [x] Increase Pelvic MM strength       []  [x]  []  []  [x] Decrease Pelvic MM hypertonus     []  [x]  []  []  [] Decrease Incontinence Episodes    []  []  []  []   [] Improve Voiding Habits        []  []  []  []   [] Decreased Urgency        []  []  []  []  [x] Decrease Pelvic Pain        [x]  []  []  []    Key Functional Changes: Patient no longer limited in ADLs or basic exercise activities 2/2 pain, see goals progress. Currently progressing towards return to previous level of exercise and safe return to sport. Updated Goals: to be achieved in 4 weeks:   Patient will be able to maintain pelvic floor contraction for 10 seconds, 10 repetitions with no accessory substitution or breath holding. Status at eval: [facility] 3 seconds, 4 repetitions   2021  PERF: 3+/8-9/10//10; GOAL MOSTLY MET     Patient will tolerate advanced ADLs including social, housework, and sporting/exercise activities with no abdominal pain or limitation.    Status at eval: patient unable to run or perform previous exercise 2/2 pelvic discomfort     2021 Patient reports generally 0/10 pain throughout the day and if pain does occur, reports at 0.5/10 and using management tools/exercises to resolve; does not feel limited functionally throughout the day 2/2 pain. GOAL MET     Patient will demonstrate pelvic floor muscle contraction 4/5 and ability to maintain contraction for 10 seconds, 10 repetitions. Status at eval: PFM 3/5, 3 second hold, 4 repetitions, with substitution  2/18/2021  PERF: 3+/8-9/10//10; GOAL MOSTLY MET     UPDATED GOALS:    Patient will tolerate med dilator, dynamic insertion for 10 minutes (or intercourse) with pain no more than 2/10  Status AP 06/40/04: QEK TMFKYQVZS elicits 0/61 pain, INJWRODDVTD 7/59 pain (initial and deep)  2/18/2021 Pain ranging 0-2 with dilator and intercourse; GOAL MET     Patient will demonstrate independence with management tools and exercise program that are beneficial for current condition in order to feel comfortable with PFPT D/C and not fear exacerbation of current condition or s/s returning.    Status 1/19/2021 : patient still fearful of return to exercise outside of current HEP and unaware of what activities to avoid to avoid exacerbation of current condition and symptoms  2/18/2021 Intro to dynamic exercises and plyometics today; patient plan to return to gentle jogging and previous exercise routine prior to DC; if all tolerated and appropriate, plan to DC in next 1-4 weeks; PROGRESSING TOWARDS GOAL    ASSESSMENT/RECOMMENDATIONS:  [x]Continue therapy per initial plan/protocol at a frequency of  1 x per week for 4 weeks as appropriate, DC in next 1-4 weeks if pt able to return to sport/exercise without pain  []Continue therapy with the following recommended changes:_____________________      _____________________________________________________________________  []Discontinue therapy progressing towards or have reached established goals  []Discontinue therapy due to lack of appreciable progress towards goals  []Discontinue therapy due to lack of attendance or compliance  []Await Physician's recommendations/decisions regarding therapy  []Other:________________________________________________________________    Thank you for this referral.   Melvin Tidwell, PT 2/18/2021 12:45 PM  NOTE TO PHYSICIAN:  PLEASE COMPLETE THE ORDERS BELOW AND   FAX TO [facility]: [phone]  If you are unable to process this request in 24 hours please contact our office: [phone]      ? I have read the above report and request that my patient continue as recommended. ? I have read the above report and request that my patient continue therapy with the following changes/special instructions:___________________________________________________________  ? I have read the above report and request that my patient be discharged from therapy.

## 2021-02-26 ENCOUNTER — HOSPITAL ENCOUNTER (OUTPATIENT)
Dept: PHYSICAL THERAPY | Age: 26
Discharge: HOME | End: 2021-02-26
Payer: COMMERCIAL

## 2021-02-26 PROCEDURE — 97140 MANUAL THERAPY 1/> REGIONS: CPT

## 2021-02-26 PROCEDURE — 97535 SELF CARE MNGMENT TRAINING: CPT

## 2021-02-26 NOTE — PROGRESS NOTES
PT DAILY TREATMENT NOTE    Patient Name: Cole Shaikh  Date:2021  : 1995  [x]  Patient  Verified  Payor: Saravanan Moreno / Plan: Shirley Cea / Product Type: HMO /    In time:930  Out time:101  Total Treatment Time (min): 40  Total Timed Codes (min): 40  1:1 Treatment Time ( only): 40   Visit #: 21 of 30    Treatment Area: Pelvic pain [R10.2]    SUBJECTIVE  Pain Level (0-10 scale): 1/10  Any medication changes, allergies to medications, adverse drug reactions, diagnosis change, or new procedure performed?: [x] No    [] Yes (see summary sheet for update)  Subjective functional status/changes:   [] No changes reported  Patient reports:  compliance with current HEP. States she has been increasing activity and feeling good and stable overall, felt slight instability with jumping jacks. Reports she did a workout and felt really good for 2 days then went for a walk at the park and started noting SIJ/PS irritation after 25 minutes; since then (about one week) has had a little irritation that she cant seem to get rid of with stretches/exercises; did not attempt to do pelvic wand. OBJECTIVE      10 min Self Care: Thorough review and handout provided on the following: pelvic wand recommendation at obt internus pain onset  Reviewed plan for HEP progressions. Rationale:  Increase awareness and understanding of current condition to improve patients ability to independently and effectively attain goals and progress towards long term management of current condition.        30 min Manual Therapy:  bilat STM obt internus external release sidelying  Supine STM/MFR deep PFM throughout   Rationale: decrease pain, increase ROM, increase tissue extensibility and decrease trigger points to improve the patients ability to restore PLOF    With   [] TE   [] TA   [] neuro   [] other: Patient Education: [x] Review HEP    [] Progressed/Changed HEP based on:   [] positioning   [] body mechanics   [] transfers   [] heat/ice application    [] other:      Other Objective/Functional Measures: neutral pelvic landmarks     Pain Level (0-10 scale) post treatment: 0    ASSESSMENT/Changes in Function: Patient tolerated today's session well with no c/o pain. Patient demonstrated understanding of today's instruction, education, and recommendations. Patient is progressing appropriately towards goals. Patient will benefit from HEP independence and progressions with f/u in 3-4 weeks. Patient will continue to benefit from skilled PT services to modify and progress therapeutic interventions, address functional mobility deficits, address ROM deficits, address strength deficits, analyze and address soft tissue restrictions, analyze and cue movement patterns, analyze and modify body mechanics/ergonomics and assess and modify postural abnormalities to attain remaining goals. [x]  See Plan of Care  []  See progress note/recertification  []  See Discharge Summary         Progress towards goals / Updated goals:  Updated Goals: to be achieved in 4 weeks:              Patient will be able to maintain pelvic floor contraction for 10 seconds, 10 repetitions with no accessory substitution or breath holding. Status at eval: PFMC 3 seconds, 4 repetitions   2/18/2021  PERF: 3+/8-9/10//10; GOAL MOSTLY MET     Patient will tolerate advanced ADLs including social, housework, and sporting/exercise activities with no abdominal pain or limitation. Status at eval: patient unable to run or perform previous exercise 2/2 pelvic discomfort     2/18/2021 Patient reports generally 0/10 pain throughout the day and if pain does occur, reports at 0.5/10 and using management tools/exercises to resolve; does not feel limited functionally throughout the day 2/2 pain. GOAL MET     Patient will demonstrate pelvic floor muscle contraction 4/5 and ability to maintain contraction for 10 seconds, 10 repetitions.    Status at eval: PFM 3/5, 3 second hold, 4 repetitions, with substitution  2/26/2021  PERF: 4-/10/10//10; GOAL MOSTLY MET     UPDATED GOALS:    Patient will tolerate med dilator, dynamic insertion for 10 minutes (or intercourse) with pain no more than 2/10  Status SF 08/39/73: SXA QOCHZBAQG elicits 7/46 pain, BKVXPWUYTUX 3/44 pain (initial and deep)  2/18/2021 Pain ranging 0-2 with dilator and intercourse; GOAL MET     Patient will demonstrate independence with management tools and exercise program that are beneficial for current condition in order to feel comfortable with PFPT D/C and not fear exacerbation of current condition or s/s returning. Status 1/19/2021 : patient still fearful of return to exercise outside of current HEP and unaware of what activities to avoid to avoid exacerbation of current condition and symptoms  2/18/2021 Intro to dynamic exercises and plyometics today; patient plan to return to gentle jogging and previous exercise routine prior to DC; if all tolerated and appropriate, plan to DC in next 1-4 weeks; PROGRESSING TOWARDS GOAL       PLAN  []  Upgrade activities as tolerated     [x]  Continue plan of care  [x]  Update interventions per flow sheet       []  Discharge due to:_  []  Other:_      Seun Organ, PT 2/26/2021  9:37 AM    No future appointments.

## 2021-03-26 ENCOUNTER — HOSPITAL ENCOUNTER (OUTPATIENT)
Dept: PHYSICAL THERAPY | Age: 26
Discharge: HOME | End: 2021-03-26
Payer: COMMERCIAL

## 2021-03-26 PROCEDURE — 97535 SELF CARE MNGMENT TRAINING: CPT

## 2021-03-26 PROCEDURE — 97110 THERAPEUTIC EXERCISES: CPT

## 2021-03-26 PROCEDURE — 97112 NEUROMUSCULAR REEDUCATION: CPT

## 2021-03-26 NOTE — PROGRESS NOTES
[facility]  [address] [place], [address]  Ph [phone]  Fx [phone]    Physical Therapy Discharge Summary    Patient name: Antonietta Goodpasture Start of Care: 10/20/2020   Referral source: Jose Rodriguez CNM : 1995   Medical/Treatment Diagnosis: Pelvic pain [R10.2] Onset Date:recent pregnancy, worsening since 2020 delivery        Prior Hospitalization: see medical history Provider#: [id number]   Medications: Verified on Patient Summary List    Comorbidities: July 15, 2020 vaginal delivery; tearing no known grade \"minor\", breastfeeding, heart palpitations   Prior Level of Function:  prior to pregnancy, no pelvic pain; functional with ADLs and IADLs     Visits from Start of Care: 22    Missed Visits: 2    Reporting Period : 10/20/2020 to 3/26/2021      Summary of Care:    Patient has achieved or is progressing independently towards goals and is ready for DC and independence with updated HEP and progressions. Updated Goals: to be achieved in 4 weeks:              Patient will be able to maintain pelvic floor contraction for 10 seconds, 10 repetitions with no accessory substitution or breath holding. Status at eval: PFMC 3 seconds, 4 repetitions   3/26/2021 kegels 10/10/10 once/day; no UI; GOAL MET     Patient will tolerate advanced ADLs including social, housework, and sporting/exercise activities with no abdominal pain or limitation. Status at eval: patient unable to run or perform previous exercise 2/2 pelvic discomfort     3/26/2021 no pain in the last 4 weeks independence with HEP and progressions. GOAL MET     Patient will demonstrate pelvic floor muscle contraction 4/5 and ability to maintain contraction for 10 seconds, 10 repetitions.    Status at eval: PFM 3/5, 3 second hold, 4 repetitions, with substitution  2021  PERF: 4-/10/10/10; GOAL MOSTLY MET     UPDATED GOALS:    Patient will tolerate med dilator, dynamic insertion for 10 minutes (or intercourse) with pain no more than 2/10  Status OC 70/42/24: GOS GOETZPQUR elicits 1/38 pain, QOQNBOGINNB 6/97 pain (initial and deep)  3/26/2021 Pain ranging 0 with intercourse; GOAL MET     Patient will demonstrate independence with management tools and exercise program that are beneficial for current condition in order to feel comfortable with PFPT D/C and not fear exacerbation of current condition or s/s returning.    Status 1/19/2021 : patient still fearful of return to exercise outside of current HEP and unaware of what activities to avoid to avoid exacerbation of current condition and symptoms  3/26/2021 Patient using pelvic wand once/week as needed for pain management and compliant with HEP; patient ready for DC as she is able to return to all previous activities without pain or UI complaints; GOAL MET    ASSESSMENT/RECOMMENDATIONS:  [x]Discontinue therapy: [x]Patient has reached or is progressing toward set goals      []Patient is non-compliant or has abdicated      []Due to lack of appreciable progress towards set goals    Marcos Dodge, PT 3/26/2021 1:04 PM

## 2021-03-26 NOTE — PROGRESS NOTES
PT DAILY TREATMENT NOTE    Patient Name: Ankita Gale  Date:3/26/2021  : 1995  [x]  Patient  Verified  Payor: Aureliano Fees / Plan: Obie Esparza / Product Type: HMO /    In time:1015  Out time:1100  Total Treatment Time (min): 45  Total Timed Codes (min): 45  1:1 Treatment Time ( [address] only): 39   Visit #: 22 of 30    Treatment Area: Pelvic pain [R10.2]    SUBJECTIVE  Pain Level (0-10 scale): 0  Any medication changes, allergies to medications, adverse drug reactions, diagnosis change, or new procedure performed?: [x] No    [] Yes (see summary sheet for update)  Subjective functional status/changes:   [] No changes reported  Patient reports: compliance with current HEP. States she has been doing really well, no pain; jumped rope yesterday with no issues. Patient comfortable and compliant with current and updated HEP and prepared for DC. OBJECTIVE      15 min Therapeutic Exercise:  [x] See flow sheet :    Rationale: increase ROM, increase strength, improve coordination and increase proprioception to improve the patients ability to restore PLOF      15 min Self Care: Thorough review and handout provided on the following: hypopressive indications and rationale; Ed on activities to increase slowly and monitor s/s  Reviewed DC plan for long term management of current condition   Rationale:  Increase awareness and understanding of current condition to improve patients ability to independently and effectively attain goals and progress towards long term management of current condition. 15 min Neuromuscular Re-education:  [x]  See flow sheet : hypopressive intro, requirec VC to coordinate apnea, initially with difficulty but after VC/TC pt demo proper coordination   Rationale: Increase pelvic floor muscle strength, Improve quality of pelvic floor contractions and Increase core strength in order to Improve ability to perform ADLs.           With   [] TE   [] TA   [x] neuro   [] other: Patient Education: [x] Review HEP    [] Progressed/Changed HEP based on:   [] positioning   [] body mechanics   [] transfers   [] heat/ice application    [] other:        Pain Level (0-10 scale) post treatment: 0    ASSESSMENT/Changes in Function: Patient tolerated today's session well with no c/o pain. Patient demonstrated understanding of today's instruction, education, and recommendations for long term management of current condition. Patient has achieved goals and is ready for DC and independence with updated HEP and progressions. []  See Plan of Care  []  See progress note/recertification  [x]  See Discharge Summary         Progress towards goals / Updated goals:  Updated Goals: to be achieved in 4 weeks:              Patient will be able to maintain pelvic floor contraction for 10 seconds, 10 repetitions with no accessory substitution or breath holding. Status at eval: PFMC 3 seconds, 4 repetitions   3/26/2021 kegels 10/10/10 once/day; no UI; GOAL MET     Patient will tolerate advanced ADLs including social, housework, and sporting/exercise activities with no abdominal pain or limitation. Status at eval: patient unable to run or perform previous exercise 2/2 pelvic discomfort     3/26/2021 no pain in the last 4 weeks independence with HEP and progressions. GOAL MET     Patient will demonstrate pelvic floor muscle contraction 4/5 and ability to maintain contraction for 10 seconds, 10 repetitions.    Status at eval: PFM 3/5, 3 second hold, 4 repetitions, with substitution  2/26/2021  PERF: 4-/10/10//10; GOAL MOSTLY MET     UPDATED GOALS:    Patient will tolerate med dilator, dynamic insertion for 10 minutes (or intercourse) with pain no more than 2/10  Status YL 73/01/15: QNA ROYXCNWZV elicits 6/26 pain, LWWXBEITSCP 4/37 pain (initial and deep)  3/26/2021 Pain ranging 0 with intercourse; GOAL MET     Patient will demonstrate independence with management tools and exercise program that are beneficial for current condition in order to feel comfortable with PFPT D/C and not fear exacerbation of current condition or s/s returning. Status 1/19/2021 : patient still fearful of return to exercise outside of current HEP and unaware of what activities to avoid to avoid exacerbation of current condition and symptoms  3/26/2021 Patient using pelvic wand once/week as needed for pain management and compliant with HEP; patient ready for DC as she is able to return to all previous activities without pain or UI complaints; GOAL MET    PLAN  []  Upgrade activities as tolerated     []  Continue plan of care  []  Update interventions per flow sheet       [x]  Discharge due to:_ Patient has met all goals and is independent and confident with exercise progressions and management tools for long term management of current condition. []  Other:_      Melvin Tidwell, PT 3/26/2021  10:19 AM    No future appointments.

## 2021-05-10 PROBLEM — E55.9 VITAMIN D DEFICIENCY: Status: ACTIVE | Noted: 2018-07-24

## 2021-05-10 PROBLEM — I49.3 VENTRICULAR PREMATURE BEATS: Status: ACTIVE | Noted: 2018-07-24

## 2021-05-22 PROBLEM — Z33.1 IUP (INTRAUTERINE PREGNANCY), INCIDENTAL: Status: RESOLVED | Noted: 2020-07-07 | Resolved: 2021-05-22

## 2021-05-22 PROBLEM — Z3A.37 37 WEEKS GESTATION OF PREGNANCY: Status: RESOLVED | Noted: 2020-07-07 | Resolved: 2021-05-22

## 2021-08-20 ENCOUNTER — HOSPITAL ENCOUNTER (OUTPATIENT)
Dept: NON INVASIVE DIAGNOSTICS | Age: 26
Discharge: HOME | End: 2021-08-20
Payer: COMMERCIAL

## 2021-08-20 DIAGNOSIS — Z98.890 HISTORY OF CARDIAC RADIOFREQUENCY ABLATION: ICD-10-CM

## 2021-08-20 DIAGNOSIS — O09.91 SUPERVISION OF HIGH RISK PREGNANCY IN FIRST TRIMESTER: ICD-10-CM

## 2021-08-20 LAB
ATRIAL RATE: 73 BPM
CALCULATED P AXIS, ECG09: 25 DEGREES
CALCULATED R AXIS, ECG10: 75 DEGREES
CALCULATED T AXIS, ECG11: 53 DEGREES
DIAGNOSIS, 93000: NORMAL
P-R INTERVAL, ECG05: 136 MS
Q-T INTERVAL, ECG07: 386 MS
QRS DURATION, ECG06: 88 MS
QTC CALCULATION (BEZET), ECG08: 425 MS
VENTRICULAR RATE, ECG03: 73 BPM

## 2021-08-20 PROCEDURE — 93005 ELECTROCARDIOGRAM TRACING: CPT

## 2021-10-18 ENCOUNTER — HOSPITAL ENCOUNTER (OUTPATIENT)
Dept: PHYSICAL THERAPY | Age: 26
Discharge: HOME | End: 2021-10-18
Payer: COMMERCIAL

## 2021-10-18 PROCEDURE — 97140 MANUAL THERAPY 1/> REGIONS: CPT

## 2021-10-18 PROCEDURE — 97110 THERAPEUTIC EXERCISES: CPT

## 2021-10-18 PROCEDURE — 97535 SELF CARE MNGMENT TRAINING: CPT

## 2021-10-18 PROCEDURE — 97162 PT EVAL MOD COMPLEX 30 MIN: CPT

## 2021-10-18 NOTE — PROGRESS NOTES
[facility]  [address] [place], [address]  Ph [phone]  Fx [phone]    Plan of Care/ Statement of Necessity for Physical Therapy Services    Patient name: Dameon Hicks Start of Care: 10/18/2021   Referral source: Kiran Saldivar MD : 1995    Medical Diagnosis: Pelvic floor dysfunction [M62.89]   Onset Date:7 weeks current pregnancy ( 20 weeks at IE)    Treatment Diagnosis: Pelvic floor dysfunction [M62.89]   Prior Hospitalization: see medical history Provider#: [id number]   Medications: Verified on Patient summary List    Comorbidities: PMHx/Surgical Hx: cardiac ablation (PVCs), pregnancy/vaginal delivery 2020; current pregnancy   Prior Level of Function: active lifestyle, not functionally limited, no pain with intercourse          The Plan of Care and following information is based on the information from the initial evaluation. Assessment/ key information: Patient is a 27year old female presenting to Physical Therapy with c/o abdominopelvic pain in current pregnancy which  is limiting ability function at previous level. Patient has pubic pain complaints with walking, childcare and bed mobility as well as perineal/vaginal pain with intercourse. Patient presents with decreased strength, ROM, and endurance and decreased strength of postural stabilizers. Patient also presents with soft tissue restrictions, postural deficits, and pelvic obliquities. . I feel patient would benefit from skilled therapeutic intervention to optimize highest functional level possible.      Patient will continue to benefit from skilled PT services to modify and progress therapeutic interventions, address functional mobility deficits, address ROM deficits, address strength deficits, analyze and address soft tissue restrictions, analyze and cue movement patterns, analyze and modify body mechanics/ergonomics and assess and modify postural abnormalities to attain remaining goals. Evaluation Complexity History MEDIUM  Complexity : 1-2 comorbidities / personal factors will impact the outcome/ POC ; Examination MEDIUM Complexity : 3 Standardized tests and measures addressing body structure, function, activity limitation and / or participation in recreation  ;Presentation MEDIUM Complexity : Evolving with changing characteristics  ; Clinical Decision Making MEDIUM Complexity : FOTO score of 26-74 FOTO score = an established functional score where 100 = no disability  Overall Complexity Rating: MEDIUM    Problem List: Pelvic pain/dysfunction, Use of accessory muscles and Other  Treatment Plan may include any combination of the following: Therapeutic exercise, Neuromuscular re-education, Manual therapy, Physical agent/modality and Patient education  Patient / Family readiness to learn indicated by: asking questions, trying to perform skills and interest  Persons(s) to be included in education: patient (P)  Barriers to Learning/Limitations: None  Measures taken if barriers to learning: NA  Patient Goal (s): To decrease pain and maintain/improve my range of motion and function  Patient Self Reported Health Status: good  Rehabilitation Potential: good    Short Term Goals: To be accomplished in 2 weeks:  Patient will report compliance with PSD HEP 3x/day to aid in rehabilitation program.  Status at IE: Pt was given and pt demonstrated and reported and understanding. Patient will increase lumbar flexion ROM to greater than 6 inches from fibular head to aid in completion of ADLs  Status at IE: lacking lumbar flexion , fingertips approx 4 inches from fibular head (approx 10 inches from the ground). 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   Status at IE:Pt reports the ability to walk for no more than 0-2 minutes without increased pain, reports 30 minute walk/stand increases to approx 7/10 pain. Patient will report 50% decrease in pain complaints demonstrated by improved bed mobility (rolling over in bed) no more than 2/10 to improve quality of sleep. Status at eval: Patient reports 4/10 pain rolling over in bed every night. Patient will be able to lift 30# from floor with proper mechanics and no report of pain  Status at eval: Patient unable to perform 25# carseat lifts and in/out of car without report of pain 5/10. Patient will demonstrate independence with management tools and exercise program that are beneficial for current condition in order to feel comfortable with Pelvic floor PT D/C and not fear exacerbation of current condition or symptoms returning. Status at eval : patient fearful of return to exercise and unaware of what activities to avoid to avoid exacerbation of current condition    Frequency / Duration: Patient to be seen 2 times per week for 2 weeks then 1x/week for 8 weeks. Patient/ Caregiver education and instruction: Diagnosis, prognosis, Pain Management and Exercises   [x]  Plan of care has been reviewed with PTA    Certification Period: KYLER To, PT 10/18/2021 12:44 PM    ________________________________________________________________________    I certify that the above Therapy Services are being furnished while the patient is under my care. I agree with the treatment plan and certify that this therapy is necessary.     Physician's Signature:____________________  Date:____________Time:____________                                      Oneyda Miguel MD      Please sign and return to [facility] [address], [address]  Ph [phone]  Fx [phone]

## 2021-10-18 NOTE — PROGRESS NOTES
Physical Therapy Evaluation        Patient Name: Zohreh Hernandez  HNIA:  : 1995  [x]  Patient  Verified  Payor: Steff Woodard / Plan: Algis Bamberger / Product Type: HMO /    In time:1015  Out time:1100  Total Treatment Time (min): 45  Visit #: 1 of 14    Medicare/BCBS Only   Total Timed Codes (min):  25 1:1 Treatment Time:  45       Treatment Area: Pelvic floor dysfunction [M62.89]    SUBJECTIVE  Pain Level (0-10 scale): 3 with ambulation  []constant [x]intermittent []improving [x]worsening []no change since onset    Any medication changes, allergies to medications, adverse drug reactions, diagnosis change, or new procedure performed?: [x] No    [] Yes (see summary sheet for update)  Subjective functional status/changes:     PLOF: active lifestyle, not functionally limited, no pain with intercourse  Limitations to PLOF: Decreased strength, ROM, coordination and endurance, pregnancy/hormonal fluctuations  Mechanism of Injury: pregnancy (currently 20 weeks pregnant)  Current symptoms/Complaints: Patient presents to PT at 20 weeks gestation, c/o pain since 7 weeks current pregnancy. Patient states she is having PS and round ligament pain and some pelvic/vaginal pain mostly on right side and general lower abdominal pain. States she started her stretching and stabilization exercises which has been carrying her through until recently, now feels like she does not know what to do to manage pain; still doing stretches. No UI or and minimal pain with intercourse, initial penetration, every time. Previous Treatment/Compliance: LE stretches, Kegels 10/10/10, 2/4/10  PMHx/Surgical Hx: cardiac ablation (PVCs), pregnancy/vaginal delivery 2020; current pregnancy  Work Hx: mental health counselor  Living Situation: spouse and child  Pt Goals:  \"To decrease pain and maintain/improve my range of motion and function\"  Barriers: []pain []financial []time []transportation []other  Motivation: high  Substance use: []Alcohol []Tobacco []other:   Cognition: A & O x 4    Other:    Current urinary complaint  None    Daytime urinary frequency:  Every 2-3 hour(s) during the day    Nocturia:  2x/ night    Patient has failed previous pelvic floor muscle training? [x] Yes    [] No    Bowel function:  Regular BM, 5-7 per week  Stool Type ([place]): 4  Toileting position/modifications: standard    Fecal Incontinence present? no    Pain complaint:  suprapubic pain/discomfort  lower abdomen  vaginal pain: opening (interoitus) and only with intercourse, pelvic exam  lower right obturator/glut pain    Pain scale:  Verbal Analog scale: average daily pain 3, best day 0, worst pain 6    Pain description:  daily: intermittent, mild, moderate  worsens with: walking, lifting, bed mobility/rolling  improves with: stretches, full body pillow     -Walking 0-2 minutes increases pain; not walking due to pain, slow ambulation 7/10 pain after 30 miuntes  -lifting/carrying car seat in/out of car : 5/10 (25#)  -Rolling over in bed: 4/10 with 100% movement  -STS 3/10, 50% of the time    [place]: 2/10 initial penetration, all positions    Diet: well balanced    Fluid intake:    Fluid  Amount per day   Water 64 + oz/day   Caffeine 0   Alcohol 0             Physical Exam Objective Findings:    Gait:  [] Normal     [x] Abnormal:excessive lumbar lordosis, limited pelvic rotation, hyperextension bilat LE in stance, excessive femoral ER    Active Movements: [] N/A   [] Too acute   [] Other:  ROM % AROM Comments:pain, area   Forward flexion 40-60 50% Fingertips 6 inches below fibular head   Extension 20-30 WFL    SideBend right 20-30 WFL    SideBend left 20-30 WFL    Rotation right 5-10 WFL 1/10 p! Rotation left 5-10 [facility]  2/10 p!        Palpation  [] Min  [x] Mod  [] Severe    Location:adductors, QL, piriformis      Strength   L(0-5) R (0-5) N/T   Hip Flexion (L1,2) 4+ 4+    Knee Extension (L3,4) 5 5 []   Knee Flexion (S1,2) 5 5 []   Abdominals 4-  [] Hip Abduction 4+ 4+ []         Special Tests    [place]:  [place]'s: [x] R    [x] L    [] +    [x] -       Pelvic girdle alignment:  level pelvis, SI joint tenderness, suspect possible hypermobility  obliquity -  left anterior/ right posterior, anterior and posterior- bilateral painful       Postural assessment:  stance with hyperlordotic lumbar spine    Range of Motion:  Patient with significant low back ROM restriction and accommodates with postural and gait changes         20 min [x]Eval                  []Re-Eval       9 min Self Care: Review and handout provided on the following: PFM anatomy, structure and function as it pertains to current s/s, complaints and condition. Reviewed expectations for PFPT and POC. Pregnancy and post-partum recommendations handout  Provided HEP for PSD and ed re: bed mobility, sleeping positions, postural recs  Ktape care instructions   Rationale:  Increase awareness and understanding of current condition to improve patients ability to independently and effectively attain goals and progress towards long term management of current condition.      8 min Therapeutic Exercise:  [] See flow sheet :   Rationale: increase strength to improve the patients ability to decrease PSD and restore PLOF and pelvic landmarks    8 min Manual Therapy:  MET to correct left ant/right post innominate  Ktape SIJ star   Rationale: decrease pain and increase postural awareness to restore pelvic landmark neutral and improve functional mobility/decrease pain          With   [x] TE   [] TA   [] neuro   [x] other: Patient Education: [x] Review HEP    [] Progressed/Changed HEP based on:   [] positioning   [] body mechanics   [] transfers   [] heat/ice application    [] other:           Pain Level (0-10 scale) post treatment: 1-2, no pubic pain; slight pain in perineum    ASSESSMENT/Changes in Function: Patient is a 27year old female presenting to Physical Therapy with c/o abdominopelvic pain in current pregnancy which  is limiting ability function at previous level. Patient has pubic pain complaints with walking, childcare and bed mobility as well as perineal/vaginal pain with intercourse. Patient presents with decreased strength, ROM, and endurance and decreased strength of postural stabilizers. Patient also presents with soft tissue restrictions, postural deficits, and pelvic obliquities. . I feel patient would benefit from skilled therapeutic intervention to optimize highest functional level possible. Patient will continue to benefit from skilled PT services to modify and progress therapeutic interventions, address functional mobility deficits, address ROM deficits, address strength deficits, analyze and address soft tissue restrictions, analyze and cue movement patterns, analyze and modify body mechanics/ergonomics and assess and modify postural abnormalities to attain remaining goals.      [x]  See Plan of Care  []  See progress note/recertification  []  See Discharge Summary        PLAN  []  Upgrade activities as tolerated     [x]  Continue plan of care  []  Update interventions per flow sheet       []  Discharge due to:_  []  Other:_  GOAL: mobilize/stabilize and strengthen: PREGNANCY therex (PSD and SIJ STABS); plan: STM/MFR, MET?, postural review, core progressions (Ball and standing), functional lifting/carrying    Bary Phlegm, PT 10/18/2021  10:21 AM

## 2021-10-22 ENCOUNTER — HOSPITAL ENCOUNTER (OUTPATIENT)
Dept: PHYSICAL THERAPY | Age: 26
Discharge: HOME | End: 2021-10-22
Payer: COMMERCIAL

## 2021-10-22 PROCEDURE — 97110 THERAPEUTIC EXERCISES: CPT

## 2021-10-22 PROCEDURE — 97530 THERAPEUTIC ACTIVITIES: CPT

## 2021-10-22 PROCEDURE — 97112 NEUROMUSCULAR REEDUCATION: CPT

## 2021-10-22 NOTE — PROGRESS NOTES
PT DAILY TREATMENT NOTE    Patient Name: Gerhardt Bowling  [facility]:  : 1995  [x]  Patient  Verified  Payor: Donald White / Plan: Sabrina Mason / Product Type: HMO /    In time:3:19 PM  Out time:4:00 PM  Total Treatment Time (min): 41  Total Timed Codes (min): 41  1:1 Treatment Time ( [address] only): 41   Visit #: 2 of 14    Treatment Area: Pelvic floor dysfunction [M62.89]    SUBJECTIVE  Pain Level (0-10 scale): 3/10  Any medication changes, allergies to medications, adverse drug reactions, diagnosis change, or new procedure performed?: [x] No    [] Yes (see summary sheet for update)  Subjective functional status/changes:   [] No changes reported  Patient reports: good compliance with current HEP. OBJECTIVE    15 min Therapeutic Exercise:  [x] See flow sheet :   Rationale: Increase pelvic floor muscle strength, Improve quality of pelvic floor contractions, Decrease resting tone of the pelvic floor, Increase tissue extensibility of the pelvic floor muscles, Increase core strength, Inhibit abnormal muscle activity and Improve lumbosacral and coccygeal mobility in order to Increase urinary continence, Decrease urinary urgency, Increase ability to delay urination, Decrease frequency of urination, Decrease nocturia, Increase fecal continence, Decrease bowel urgency, Improve frequency and ease of bowel movements, Improve ability to engage in sexual intercourse and Improve ability to perform ADLs.       10 min Therapeutic Activity:  [x]  See flow sheet : mini squats, QP activity   Rationale: Increase pelvic floor muscle strength, Improve quality of pelvic floor contractions, Decrease resting tone of the pelvic floor, Increase tissue extensibility of the pelvic floor muscles, Increase core strength, Inhibit abnormal muscle activity and Improve lumbosacral and coccygeal mobility in order to Increase urinary continence, Decrease urinary urgency, Increase ability to delay urination, Decrease frequency of urination, Decrease nocturia, Increase fecal continence, Decrease bowel urgency, Improve frequency and ease of bowel movements, Improve ability to engage in sexual intercourse, Improve ability to undergo a gynecological exam and Improve ability to perform ADLs. 16 min Neuromuscular Re-education:  [x]  See flow sheet :  Seated swiss ball activities   Rationale: Increase pelvic floor muscle strength, Improve quality of pelvic floor contractions, Decrease resting tone of the pelvic floor, Increase tissue extensibility of the pelvic floor muscles, Increase core strength, Inhibit abnormal muscle activity and Improve lumbosacral and coccygeal mobility in order to Increase urinary continence, Decrease urinary urgency, Increase ability to delay urination, Decrease frequency of urination, Decrease nocturia, Increase fecal continence, Decrease bowel urgency, Improve frequency and ease of bowel movements, Improve ability to engage in sexual intercourse, Improve ability to undergo a gynecological exam and Improve ability to perform ADLs. With   [] TE   [] TA   [] neuro   [] other: Patient Education: [x] Review HEP    [] Progressed/Changed HEP based on:   [] positioning   [] body mechanics   [] transfers   [] heat/ice application    [] other:      Other Objective/Functional Measures:      Pain Level (0-10 scale) post treatment: 2/10    ASSESSMENT/Changes in Function: Patient tolerated today's session well with no c/o pain. Patient demonstrated understanding of today's instruction, education, and recommendations. Patient is progressing appropriately towards goals. Patient puts forth great effort with session. Some difficulty with trunk stabilization on swiss ball with alternating UE/LE lifts though able to improve with use of mirror as visual cue. Patient requires maximal cueing for posture as she presents with lumbar lordosis. Continue to focus on axial elongation.      Patient will continue to benefit from skilled PT services to modify and progress therapeutic interventions, address functional mobility deficits, address ROM deficits, address strength deficits, analyze and address soft tissue restrictions, analyze and cue movement patterns, analyze and modify body mechanics/ergonomics, assess and modify postural abnormalities and instruct in home and community integration to attain remaining goals. [x]  See Plan of Care  []  See progress note/recertification  []  See Discharge Summary         Progress towards goals / Updated goals:  Short Term Goals: To be accomplished in 2 weeks:  Patient will report compliance with PSD HEP 3x/day to aid in rehabilitation program.  Status at IE: Pt was given and pt demonstrated and reported and understanding. 10/22/21: MET, patient reports compliance                  Patient will increase lumbar flexion ROM to greater than 6 inches from fibular head to aid in completion of ADLs  Status at IE: lacking lumbar flexion , fingertips approx 4 inches from fibular head (approx 10 inches from the ground).      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 Status at IE:Pt reports the ability to walk for no more than 0-2 minutes without increased pain, reports 30 minute walk/stand increases to approx 7/10 pain.      Patient will report 50% decrease in pain complaints demonstrated by improved bed mobility (rolling over in bed) no more than 2/10 to improve quality of sleep.   Status at eval: Patient reports 4/10 pain rolling over in bed every night.      Patient will be able to lift 30# from floor with proper mechanics and no report of pain  Status at eval: Patient unable to perform 25# carseat lifts and in/out of car without report of pain 5/10.     Patient will demonstrate independence with management tools and exercise program that are beneficial for current condition in order to feel comfortable with Pelvic floor PT D/C and not fear exacerbation of current condition or symptoms returning. Status at eval : patient fearful of return to exercise and unaware of what activities to avoid to avoid exacerbation of current condition    PLAN  []? Upgrade activities as tolerated     [x]? Continue plan of care  []? Update interventions per flow sheet       []? Discharge due to:_  []?   Other:_  GOAL: mobilize/stabilize and strengthen: PREGNANCY therex (PSD and SIJ STABS); plan: STM/MFR, MET?, postural review, core progressions (Ball and standing), functional lifting/carrying      Joey Rayo 10/22/2021  1:03 PM    Future Appointments   Date Time Provider Viraj Ramirez   10/22/2021  3:30 PM [facility]   10/29/2021  2:45 PM Kalpesh Ren [facility]   11/5/2021  3:30 PM Nisha Puga [facility]   11/10/2021  8:45 AM [facility]   11/15/2021  8:00 AM Nisha Puga [facility]   11/24/2021  3:30 PM Nisha Puga [facility]   12/1/2021  8:00 AM Dewar, Rosendo Mcburney, [facility]

## 2021-10-29 ENCOUNTER — HOSPITAL ENCOUNTER (OUTPATIENT)
Dept: PHYSICAL THERAPY | Age: 26
Discharge: HOME | End: 2021-10-29
Payer: COMMERCIAL

## 2021-10-29 PROCEDURE — 97530 THERAPEUTIC ACTIVITIES: CPT

## 2021-10-29 PROCEDURE — 97110 THERAPEUTIC EXERCISES: CPT

## 2021-10-29 NOTE — PROGRESS NOTES
PT DAILY TREATMENT NOTE    Patient Name: Carina Hooper  [facility]:  : 1995  [x]  Patient  Verified  Payor: Janice Denita / Plan: Clarita Hutson / Product Type: HMO /    In time:253  Out time:   Total Treatment Time (min): 338  Total Timed Codes (min): 45  1:1 Treatment Time ([facility] only): 39   Visit #: 3 of 14    Treatment Dx: Pelvic floor dysfunction [M62.89]    SUBJECTIVE  Pain Level (0-10 scale): 4-5/10 sacral and pubic symphysis regions  Any medication changes, allergies to medications, adverse drug reactions, diagnosis change, or new procedure performed?: [x] No    [] Yes (see summary sheet for update)  Subjective functional status/changes:   [] No changes reported  Patient reported pain with rolling in bed and lifting her son.     OBJECTIVE    Modalities Rationale:     decrease edema, decrease inflammation and decrease pain to improve patient's ability to allow her to ambulate and perform tasks with decreased pain   min [] Estim, type/location:                                      []  att     []  unatt     []  w/US     []  w/ice    []  w/heat    min []  Mechanical Traction: type/lbs                   []  pro   []  sup   []  int   []  cont    []  before manual    []  after manual    min []  Ultrasound, settings/location:      min []  Iontophoresis w/ dexamethasone, location:                                               []  take home patch       []  in clinic   10 min [x]  Ice     []  Heat    location/position:     min []  Vasopneumatic Device, press/temp:    If using vaso (only need to measure limb vaso being performed on)      pre-treatment girth :       post-treatment girth :       measured at (landmark location) :      min []  Other:    [] Skin assessment post-treatment (if applicable):    []  intact    []  redness- no adverse reaction                  []redness  adverse reaction:        30 min Therapeutic Exercise:  [x] See flow sheet :   Rationale: increase ROM and increase strength to improve the patients ability to engaged transversus abdominus and spinal erectors to provide lumbosacral stability    15 min Therapeutic Activity:  [x]  See flow sheet :  Functional lifting   Rationale: increase strength and improve coordination  to improve the patients ability to perform  tasks and ADLs with decreased pain               With   [] TE   [] TA   [] neuro   [] other: Patient Education: [x] Review HEP    [] Progressed/Changed HEP based on:   [] positioning   [] body mechanics   [] transfers   [] heat/ice application    [] other:           Pain Level (0-10 scale) post treatment: 4/10    ASSESSMENT/Changes in Function: Patient tolerated treatment session well today. Patient had no complaints with addition of modified child's pose seated with physioball and functional lifts to exercise program to accomplish decrease in pain with performing  and other ADLs. Patient continues to make good  progress toward goals and would benefit from continued skilled PT intervention to address remaining deficits outlined in goals below. Patient will continue to benefit from skilled PT services to modify and progress therapeutic interventions, address functional mobility deficits, address ROM deficits, address strength deficits, analyze and address soft tissue restrictions, analyze and cue movement patterns, analyze and modify body mechanics/ergonomics, assess and modify postural abnormalities, address imbalance/dizziness and instruct in home and community integration to attain remaining goals. [x]  See Plan of Care  []  See progress note/recertification  []  See Discharge Summary         Progress towards goals / Updated goals:  Short Term Goals: To be accomplished in 2 weeks:  Patient will report compliance with PSD HEP 3x/day to aid in rehabilitation program.  Status at IE: Pt was given and pt demonstrated and reported and understanding.    10/22/21: MET, patient reports compliance                  Patient will increase lumbar flexion ROM to greater than 6 inches from fibular head to aid in completion of ADLs  Status at IE: lacking lumbar flexion , fingertips approx 4 inches from fibular head (approx 10 inches from the ground).       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 Status at IE:Pt reports the ability to walk for no more than 0-2 minutes without increased pain, reports 30 minute walk/stand increases to approx 7/10 pain.      Patient will report 50% decrease in pain complaints demonstrated by improved bed mobility (rolling over in bed) no more than 2/10 to improve quality of sleep. Status at eval: Patient reports 4/10 pain rolling over in bed every night.      Patient will be able to lift 30# from floor with proper mechanics and no report of pain  Status at eval: Patient unable to perform 25# carseat lifts and in/out of car without report of pain 5/10. Current:  Pt tolerated lifting 10# from her knee height to her waist with no increase in pain. (10/29/21)     Patient will demonstrate independence with management tools and exercise program that are beneficial for current condition in order to feel comfortable with Pelvic floor PT D/C and not fear exacerbation of current condition or symptoms returning.    Status at eval : patient fearful of return to exercise and unaware of what activities to avoid to avoid exacerbation of current condition       PLAN  []  Upgrade activities as tolerated     [x]  Continue plan of care  []  Update interventions per flow sheet       []  Discharge due to:_  []  Other:_      Rosette Valdez, PT 10/29/2021  1:17 PM    Future Appointments   Date Time Provider Viraj Ramirez   10/29/2021  2:45 PM Campbell Bentley, PT [facility]   11/5/2021  3:30 PM Mitzi To, PT [facility]   11/10/2021  8:45 AM Leatha Roy [address] THE [facility]   11/15/2021  8:00 AM Karlie Patel, PT [facility]   11/24/2021  3:30 PM Karlie Patel, PT [facility]   12/1/2021  8:00 AM Karuna Arana, PT [facility]

## 2021-11-05 ENCOUNTER — HOSPITAL ENCOUNTER (OUTPATIENT)
Dept: PHYSICAL THERAPY | Age: 26
Discharge: HOME | End: 2021-11-05
Payer: COMMERCIAL

## 2021-11-05 PROCEDURE — 97140 MANUAL THERAPY 1/> REGIONS: CPT

## 2021-11-05 NOTE — PROGRESS NOTES
PT DAILY TREATMENT NOTE    Patient Name: Gerhardt Bowling  Date:2021  : 1995  [x]  Patient  Verified  Payor: Donald White / Plan: Sabrina Mason / Product Type: HMO /    In time:1535  Out time:1615  Total Treatment Time (min): 40  Total Timed Codes (min): 40  1:1 Treatment Time (MC only): 40   Visit #: 4 of 14    Treatment Area: Pelvic floor dysfunction [M62.89]    SUBJECTIVE  Pain Level (0-10 scale): 4-5/10  Any medication changes, allergies to medications, adverse drug reactions, diagnosis change, or new procedure performed?: [x] No    [] Yes (see summary sheet for update)  Subjective functional status/changes:   [] No changes reported  Patient reports: compliance with current HEP. States she is having a lot more LBP and sitting is the only position that \"feels neutral and comfortable\"    OBJECTIVE    40 min Manual Therapy:  MET left ant right post innominate  MFR thoracolumbar fascia,   STM/TP release bilat QL, piriformis, Adductor, glut med, ITB/TFL  Long axis traction bilat s/l  KTape SIJ star   Rationale: decrease pain, increase ROM, increase tissue extensibility, decrease trigger points and increase postural awareness in order to Improve ability to perform ADLs. With   [] TE   [] TA   [] neuro   [x] other: Patient Education: [x] Review HEP    [] Progressed/Changed HEP based on:   [] positioning   [] body mechanics   [] transfers   [] heat/ice application    [] other:      Other Objective/Functional Measures: left ant/right post innominate     Pain Level (0-10 scale) post treatment: 0/10    ASSESSMENT/Changes in Function: Patient tolerated today's session well with no c/o pain. Patient demonstrated understanding of today's instruction, education, and recommendations. Patient is progressing appropriately towards goals.  Pt demo neutral pelvic landmarks following todays session     Patient will continue to benefit from skilled PT services to modify and progress therapeutic interventions, address functional mobility deficits, address ROM deficits, address strength deficits, analyze and address soft tissue restrictions, analyze and cue movement patterns, analyze and modify body mechanics/ergonomics and assess and modify postural abnormalities to attain remaining goals. [x]  See Plan of Care  []  See progress note/recertification  []  See Discharge Summary         Progress towards goals / Updated goals:  Short Term Goals: To be accomplished in 2 weeks:  Patient will report compliance with PSD HEP 3x/day to aid in rehabilitation program.  Status at IE: Pt was given and pt demonstrated and reported and understanding.   10/22/21: MET, patient reports compliance                  Patient will increase lumbar flexion ROM to greater than 6 inches from fibular head to aid in completion of ADLs  Status at IE: lacking lumbar flexion , fingertips approx 4 inches from fibular head (approx 10 inches from the ground).       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 Status at IE:Pt reports the ability to walk for no more than 0-2 minutes without increased pain, reports 30 minute walk/stand increases to approx 7/10 pain. 11/5/2021 worst pain 6-7/10 by end of day with with lifting son     Patient will report 50% decrease in pain complaints demonstrated by improved bed mobility (rolling over in bed) no more than 2/10 to improve quality of sleep. Status at eval: Patient reports 4/10 pain rolling over in bed every night.      Patient will be able to lift 30# from floor with proper mechanics and no report of pain  Status at eval: Patient unable to perform 25# carseat lifts and in/out of car without report of pain 5/10. Current:  Pt tolerated lifting 10# from her knee height to her waist with no increase in pain.   (10/29/21)     Patient will demonstrate independence with management tools and exercise program that are beneficial for current condition in order to feel comfortable with Pelvic floor PT D/C and not fear exacerbation of current condition or symptoms returning.    Status at eval : patient fearful of return to exercise and unaware of what activities to avoid to avoid exacerbation of current condition    PLAN  []  Upgrade activities as tolerated     [x]  Continue plan of care  [x]  Update interventions per flow sheet       []  Discharge due to:_  []  Other:_  GOAL: mobilize/stabilize and strengthen: PREGNANCY therex (PSD and SIJ STABS); plan: STM/MFR, MET?, postural review, core progressions (Ball and standing), functional lifting/carrying      Umer Dumas, PT 11/5/2021  1:04 PM    Future Appointments   Date Time Provider Viraj Ramirez   11/5/2021  3:30 PM Edvin To, PT [facility]   11/12/2021  8:45 AM Justus Barriga, PT [facility]   11/15/2021  8:00 AM Tamy Guerrero, PT [facility]   11/24/2021  3:30 PM Tamy Guerrero PT [facility]   12/1/2021  8:00 AM Tamy Guerrero, [address] [place] [address]

## 2021-11-10 ENCOUNTER — APPOINTMENT (OUTPATIENT)
Dept: PHYSICAL THERAPY | Age: 26
End: 2021-11-10
Payer: COMMERCIAL

## 2021-11-12 ENCOUNTER — HOSPITAL ENCOUNTER (OUTPATIENT)
Dept: PHYSICAL THERAPY | Age: 26
Discharge: HOME | End: 2021-11-12
Payer: COMMERCIAL

## 2021-11-12 PROCEDURE — 97110 THERAPEUTIC EXERCISES: CPT

## 2021-11-12 PROCEDURE — 97535 SELF CARE MNGMENT TRAINING: CPT

## 2021-11-12 NOTE — PROGRESS NOTES
PT DAILY TREATMENT NOTE    Patient Name: Ankita Gale  Date:2021  : 1995  [x]  Patient  Verified  Payor: Aureliano Fees / Plan: Obie Esparza / Product Type: HMO /    In time:856 Out time:930  Total Treatment Time (min): 34  Total Timed Codes (min): 34  1:1 Treatment Time ( only): 34   Visit #: 5 of 14    Treatment Area: Pelvic floor dysfunction [M62.89]    SUBJECTIVE  Pain Level (0-10 scale):  2/10  Any medication changes, allergies to medications, adverse drug reactions, diagnosis change, or new procedure performed?: [x] No    [] Yes (see summary sheet for update)  Subjective functional status/changes:   [] No changes reported  Patient reports: good  compliance with current HEP. Patient reported decrease in symptoms after manual therapy during last appointment. OBJECTIVE    22 min Therapeutic Exercise:  [x] See flow sheet :   Rationale: Increase core strength, Inhibit abnormal muscle activity and Improve lumbosacral and coccygeal mobility in order to Improve ability to perform ADLs. 12 min Self Care: Thorough review and handout provided on the following: Reviewed HEP and recommended   Rationale:  Increase awareness and understanding of current condition to improve patients ability to independently and effectively attain goals and progress towards long term management of current condition. With   [] TE   [] TA   [] neuro   [] other: Patient Education: [x] Review HEP    [] Progressed/Changed HEP based on:   [] positioning   [] body mechanics   [] transfers   [] heat/ice application    [] other:           Pain Level (0-10 scale) post treatment: 210    ASSESSMENT/Changes in Function: Patient tolerated today's session well with no c/o pain. Patient demonstrated understanding of today's instruction, education, and recommendations. Patient is progressing appropriately towards goals.   Muscle energy technique was instructed to allow the patient to manage her SI symptoms at home.    Patient will continue to benefit from skilled PT services to modify and progress therapeutic interventions, address functional mobility deficits, address ROM deficits, address strength deficits, analyze and address soft tissue restrictions, analyze and cue movement patterns, analyze and modify body mechanics/ergonomics, assess and modify postural abnormalities and instruct in home and community integration to attain remaining goals. [x]  See Plan of Care  []  See progress note/recertification  []  See Discharge Summary         Progress towards goals / Updated goals:  Short Term Goals: To be accomplished in 2 weeks:  Patient will report compliance with PSD HEP 3x/day to aid in rehabilitation program.  Status at IE: Pt was given and pt demonstrated and reported and understanding.   10/22/21: MET, patient reports compliance                  Patient will increase lumbar flexion ROM to greater than 6 inches from fibular head to aid in completion of ADLs  Status at IE: lacking lumbar flexion , fingertips approx 4 inches from fibular head (approx 10 inches from the ground).   Current:  2.5 inches proximal to fibular head. 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 Status at IE:Pt reports the ability to walk for no more than 0-2 minutes without increased pain, reports 30 minute walk/stand increases to approx 7/10 pain. 11/5/2021 worst pain 6-7/10 by end of day with with lifting son  Current: Worst pain 5/10 anytime she picks up her son (11/12/21)       Patient will report 50% decrease in pain complaints demonstrated by improved bed mobility (rolling over in bed) no more than 2/10 to improve quality of sleep. Status at eval: Patient reports 4/10 pain rolling over in bed every night.    Current:  Patient reports pain as 6/10 with rolling over bed and getting out of bed (11/12/21)     Patient will be able to lift 30# from floor with proper mechanics and no report of pain  Status at eval: Patient unable to perform 25# carseat lifts and in/out of car without report of pain 5/10. Current:  Pt tolerated lifting 10# from her knee height to her waist with no increase in pain.  (10/29/21)  Current: Pt tolerated lifting 10# from 10 inches (about height of her son when in standing) but 15# increase her pain in lower abdomen 3/10.     Patient will demonstrate independence with management tools and exercise program that are beneficial for current condition in order to feel comfortable with Pelvic floor PT D/C and not fear exacerbation of current condition or symptoms returning.    Status at eval : patient fearful of return to exercise and unaware of what activities to avoid to avoid exacerbation of current condition       PLAN  []  Upgrade activities as tolerated     [x]  Continue plan of care  [x]  Update interventions per flow sheet       []  Discharge due to:_  []  Other:_      Warrick Severin, PT 11/12/2021  8:56 AM    Future Appointments   Date Time Provider Viraj Ramirez   11/15/2021  8:00 AM Ashley Najera [facility]   11/24/2021  3:30 PM Ashley Najera [facility]   12/1/2021  8:00 AM Ashley Najera [facility]   12/9/2021  8:45 AM Rk Garcia [facility]   12/13/2021  8:00 AM Ashley Najera [facility]   12/20/2021  8:00 AM Ashley Najera [facility]   12/27/2021  3:30 PM Ashley Najera [facility]

## 2021-11-12 NOTE — PROGRESS NOTES
[facility]  [address] [place], [address] [facility]  Ph [phone]  Fx [phone]    Physical Therapy Progress Note  Patient name: Tabitha Covarrubias Start of Care: 10/18/2021   Referral source: Tommie Ayon MD : 1995   Medical/Treatment Diagnosis: Pelvic floor dysfunction [M62.89] Onset Date:at about 11 weeks ago   Prior Hospitalization: see medical history Provider#: [id number]   Medications: Verified on Patient Summary List    Comorbidities: PMHx/Surgical Hx: cardiac ablation (PVCs), pregnancy/vaginal delivery 2020; current pregnancy   Prior Level of Function: active lifestyle, not functionally limited, no pain with intercourse    Visits from Start of Care: 5    Missed Visits: 0    Updated Goals/Measure of Progress: To be achieved in 7 weeks:  Short Term Goals: To be accomplished in 2 weeks:  Patient will report compliance with PSD HEP 3x/day to aid in rehabilitation program.  Status at IE: Pt was given and pt demonstrated and reported and understanding.   10/22/21: MET, patient reports compliance                  Patient will increase lumbar flexion ROM to greater than 6 inches from fibular head to aid in completion of ADLs  Status at IE: lacking lumbar flexion , fingertips approx 4 inches from fibular head (approx 10 inches from the ground).   Current:  2.5 inches proximal to fibular head. 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 Status at IE:Pt reports the ability to walk for no more than 0-2 minutes without increased pain, reports 30 minute walk/stand increases to approx 7/10 pain.   2021 worst pain 6-7/10 by end of day with with lifting son  Current:   Worst pain 5/10 anytime she picks up her son (21)        Patient will report 50% decrease in pain complaints demonstrated by improved bed mobility (rolling over in bed) no more than 2/10 to improve quality of sleep. Status at eval: Patient reports 4/10 pain rolling over in bed every night. Current:  Patient reports pain as 6/10 with rolling over bed and getting out of bed (11/12/21)     Patient will be able to lift 30# from floor with proper mechanics and no report of pain  Status at eval: Patient unable to perform 25# carseat lifts and in/out of car without report of pain 5/10. Current:  Pt tolerated lifting 10# from her knee height to her waist with no increase in pain.  (10/29/21)  Current: Pt tolerated lifting 10# from 10 inches (about height of her son when in standing) but 15# increase her pain in lower abdomen 3/10.     Patient will demonstrate independence with management tools and exercise program that are beneficial for current condition in order to feel comfortable with Pelvic floor PT D/C and not fear exacerbation of current condition or symptoms returning.    Status at eval : patient fearful of return to exercise and unaware of what activities to avoid to avoid exacerbation of current condition    Summary of Care/ Key Functional Changes:   Patient will continue to benefit from skilled PT services to modify and progress therapeutic interventions, address functional mobility deficits, address ROM deficits, address strength deficits, analyze and address soft tissue restrictions, analyze and cue movement patterns, analyze and modify body mechanics/ergonomics, assess and modify postural abnormalities and instruct in home and community integration to attain remaining goals      ASSESSMENT/RECOMMENDATIONS:  [x]Continue therapy per initial plan/protocol at a frequency of  1 x per week for 7 weeks  []Continue therapy with the following recommended changes:_____________________ _____________________________ ________________________________________  []Discontinue therapy progressing towards or have reached established goals  []Discontinue therapy due to lack of appreciable progress towards goals  []Discontinue therapy due to lack of attendance or compliance  []Await Physician's recommendations/decisions regarding therapy  []Other:________________________________________________________________    Thank you for this referral.   Kemi Diaz, PT 11/12/2021 12:33 PM

## 2021-11-15 ENCOUNTER — APPOINTMENT (OUTPATIENT)
Dept: PHYSICAL THERAPY | Age: 26
End: 2021-11-15
Payer: COMMERCIAL

## 2021-11-24 ENCOUNTER — HOSPITAL ENCOUNTER (OUTPATIENT)
Dept: PHYSICAL THERAPY | Age: 26
Discharge: HOME | End: 2021-11-24
Payer: COMMERCIAL

## 2021-11-24 PROCEDURE — 97110 THERAPEUTIC EXERCISES: CPT

## 2021-11-24 PROCEDURE — 97530 THERAPEUTIC ACTIVITIES: CPT

## 2021-11-24 NOTE — PROGRESS NOTES
PT DAILY TREATMENT NOTE    Patient Name: Cole Shaikh  Date:2021  : 1995  [x]  Patient  Verified  Payor: Saravanan Moreno / Plan: Shirley Cea / Product Type: HMO /    In time: 333 Out time: 415  Total Treatment Time (min): 42  Total Timed Codes (min): 42  1:1 Treatment Time ( only): 42   Visit #: 6 of 14    Treatment Area: Pelvic floor dysfunction [M62.89]    SUBJECTIVE  Pain Level (0-10 scale): 2/10  Any medication changes, allergies to medications, adverse drug reactions, diagnosis change, or new procedure performed?: [x] No    [] Yes (see summary sheet for update)  Subjective functional status/changes:   [] No changes reported  Patient reports: good  compliance with current HEP. Patient reports walking as a 5/10 . Patient reports lifting as 7/10. OBJECTIVE    32 min Therapeutic Exercise:  [x] See flow sheet :   Rationale: Increase core strength, Inhibit abnormal muscle activity and Improve lumbosacral and coccygeal mobility in order to Improve ability to perform ADLs and decrease pain. 10 min Therapeutic Activity:  [x]  See flow sheet :   Rationale: Increase core strength, Inhibit abnormal muscle activity and Improve lumbosacral and coccygeal mobility in order to Improve ability to perform ADLs. And to increase the ability to perform                With   [] TE   [] TA   [] neuro   [] other: Patient Education: [x] Review HEP    [] Progressed/Changed HEP based on:   [] positioning   [] body mechanics   [] transfers   [] heat/ice application    [] other:           Pain Level (0-10 scale) post treatment: 210    ASSESSMENT/Changes in Function: Patient tolerated today's session well with no c/o pain. Patient demonstrated understanding of today's instruction, education, and recommendations. Patient is progressing appropriately towards goals. Patient continues to have difficulty with pelvic pain and pubic symphysis.       Patient will continue to benefit from skilled PT services to modify and progress therapeutic interventions, address functional mobility deficits, address ROM deficits, address strength deficits, analyze and address soft tissue restrictions, analyze and cue movement patterns, analyze and modify body mechanics/ergonomics, assess and modify postural abnormalities and instruct in home and community integration to attain remaining goals. [x]  See Plan of Care  []  See progress note/recertification  []  See Discharge Summary         Progress towards goals / Updated goals:  Short Term Goals: To be accomplished in 2 weeks:  Patient will report compliance with PSD HEP 3x/day to aid in rehabilitation program.  Status at IE: Pt was given and pt demonstrated and reported and understanding.   10/22/21: MET, patient reports compliance                  Patient will increase lumbar flexion ROM to greater than 6 inches from fibular head to aid in completion of ADLs  Status at IE: lacking lumbar flexion , fingertips approx 4 inches from fibular head (approx 10 inches from the ground).   Current:  2.5 inches proximal to fibular head. (11/12/21)  Current:  10.5 inches fingertips to floor. 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 Status at IE:Pt reports the ability to walk for no more than 0-2 minutes without increased pain, reports 30 minute walk/stand increases to approx 7/10 pain.   11/5/2021 worst pain 6-7/10 by end of day with with lifting son  Current: Worst pain 5/10 anytime she picks up her son (11/12/21)  Current :          Patient will report 50% decrease in pain complaints demonstrated by improved bed mobility (rolling over in bed) no more than 2/10 to improve quality of sleep. Status at eval: Patient reports 4/10 pain rolling over in bed every night.    Current:  Patient reports pain as 6/10 with rolling over bed and getting out of bed (11/12/21)     Patient will be able to lift 30# from floor with proper mechanics and no report of pain  Status at eval: Patient unable to perform 25# carseat lifts and in/out of car without report of pain 5/10. Current:  Pt tolerated lifting 10# from her knee height to her waist with no increase in pain.  (10/29/21)  Current: Pt tolerated lifting 10# from 10 inches (about height of her son when in standing) but 15# increase her pain in lower abdomen 3/10.     Patient will demonstrate independence with management tools and exercise program that are beneficial for current condition in order to feel comfortable with Pelvic floor PT D/C and not fear exacerbation of current condition or symptoms returning.    Status at eval : patient fearful of return to exercise and unaware of what activities to avoid to avoid exacerbation of current condition       PLAN  []  Upgrade activities as tolerated     [x]  Continue plan of care  [x]  Update interventions per flow sheet       []  Discharge due to:_  []  Other:_      Rosie Lindquist PT 11/24/2021  10:41 AM    Future Appointments   Date Time Provider Viraj Ramirez   11/24/2021  3:30 PM Shani Aleman PT [facility]   12/1/2021  8:00 AM Patrick Zaragoza [facility]   12/9/2021  8:45 AM Shani Aleman [facility]   12/13/2021  8:00 AM Patrick Zaragoza [facility]   12/20/2021  8:00 AM Patrick Zaragoza [facility]   12/27/2021  3:30 PM Patrick Zaragoza [facility]

## 2021-12-01 ENCOUNTER — HOSPITAL ENCOUNTER (OUTPATIENT)
Dept: PHYSICAL THERAPY | Age: 26
Discharge: HOME | End: 2021-12-01
Payer: COMMERCIAL

## 2021-12-01 PROCEDURE — 97140 MANUAL THERAPY 1/> REGIONS: CPT

## 2021-12-01 NOTE — PROGRESS NOTES
PT DAILY TREATMENT NOTE    Patient Name: Joshua Cloud  Date:2021  : 1995  [x]  Patient  Verified  Payor: Sharita Mari / Plan: Nunu Fulton / Product Type: HMO /    In time:0800  Out EDPL:2837  Total Treatment Time (min): 45  Total Timed Codes (min): 40  1:1 Treatment Time ([facility] only): 40   Visit #: 7 of 14    Treatment Area: Other specified disorders of muscle [M62.89]    SUBJECTIVE  Pain Level (0-10 scale): 4/10  Any medication changes, allergies to medications, adverse drug reactions, diagnosis change, or new procedure performed?: [x] No    [] Yes (see summary sheet for update)  Subjective functional status/changes:   [] No changes reported  Patient reports: compliance with current HEP. Patient states she is having a lot of pubic pain, got stuck rolling over last night and had to have her  help her roll over. OBJECTIVE    5 min Therapeutic Exercise:  [x] See flow sheet :   Rationale: Improve lumbosacral and coccygeal mobility in order to Improve ability to perform ADLs. 40 min Manual Therapy:   MFR thoracolumbar fascia,   STM/TP release bilat QL, piriformis, Adductor, glut med, ITB/TFL  Long axis traction bilat s/l  KTape SIJ star and belly support   Rationale: decrease pain, increase ROM, increase tissue extensibility, decrease trigger points and increase postural awareness in order to Improve ability to perform ADLs. With   [] TE   [] TA   [] neuro   [x] other: Patient Education: [x] Review HEP    [] Progressed/Changed HEP based on:   [] positioning   [] body mechanics   [] transfers   [] heat/ice application    [] other:        Pain Level (0-10 scale) post treatment: 2/10    ASSESSMENT/Changes in Function: Patient tolerated today's session well with no c/o pain. Patient demonstrated understanding of today's instruction, education, and recommendations. Patient is progressing appropriately towards goals.       Patient will continue to benefit from skilled PT services to modify and progress therapeutic interventions, address functional mobility deficits, address ROM deficits, address strength deficits, analyze and address soft tissue restrictions, analyze and cue movement patterns, analyze and modify body mechanics/ergonomics and assess and modify postural abnormalities to attain remaining goals. [x]  See Plan of Care  []  See progress note/recertification  []  See Discharge Summary         Progress towards goals / Updated goals:  Short Term Goals: To be accomplished in 2 weeks:  Patient will report compliance with PSD HEP 3x/day to aid in rehabilitation program.  Status at IE: Pt was given and pt demonstrated and reported and understanding.   10/22/21: MET, patient reports compliance                  Patient will increase lumbar flexion ROM to greater than 6 inches from fibular head to aid in completion of ADLs  Status at IE: lacking lumbar flexion , fingertips approx 4 inches from fibular head (approx 10 inches from the ground).   Current:  2.5 inches proximal to fibular head.  (11/12/21)  Current:  10.5 inches fingertips to floor. 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 Status at IE:Pt reports the ability to walk for no more than 0-2 minutes without increased pain, reports 30 minute walk/stand increases to approx 7/10 pain.   11/5/2021 worst pain 6-7/10 by end of day with with lifting son  Current:  Worst pain 5/10 anytime she picks up her son (11/12/21)          Patient will report 50% decrease in pain complaints demonstrated by improved bed mobility (rolling over in bed) no more than 2/10 to improve quality of sleep.   Status at eval: Patient reports 4/10 pain rolling over in bed every night.   Current:  Patient reports pain as 6/10 with rolling over bed and getting out of bed (11/12/21)  12/1/2021 Pt reports decreased LBP for several weeks but reports sharp 10/10 pain last night rolling over, spouse had to help her roll, then pain subsided     Patient will be able to lift 30# from floor with proper mechanics and no report of pain  Status at eval: Patient unable to perform 25# carseat lifts and in/out of car without report of pain 5/10. Current:  Pt tolerated lifting 10# from her knee height to her waist with no increase in pain.  (10/29/21)  Current: Pt tolerated lifting 10# from 10 inches (about height of her son when in standing) but 15# increase her pain in lower abdomen 3/10.     Patient will demonstrate independence with management tools and exercise program that are beneficial for current condition in order to feel comfortable with Pelvic floor PT D/C and not fear exacerbation of current condition or symptoms returning.    Status at eval : patient fearful of return to exercise and unaware of what activities to avoid to avoid exacerbation of current condition    PLAN  []  Upgrade activities as tolerated     [x]  Continue plan of care  [x]  Update interventions per flow sheet       []  Discharge due to:_  []  Other:_  Continue MT and glut strength, add glut strengthening/ball stabs to OLGA LIDIA Meade PT 12/1/2021  8:05 AM    Future Appointments   Date Time Provider Viraj Ramirez   12/13/2021  8:00 AM Ulises Baca PT [facility]   12/20/2021  8:00 AM Ulises Baca PT [facility]   12/27/2021  3:30 PM Ulises Baca PT [facility]

## 2021-12-09 ENCOUNTER — APPOINTMENT (OUTPATIENT)
Dept: PHYSICAL THERAPY | Age: 26
End: 2021-12-09
Payer: COMMERCIAL

## 2021-12-13 ENCOUNTER — HOSPITAL ENCOUNTER (OUTPATIENT)
Dept: PHYSICAL THERAPY | Age: 26
Discharge: HOME | End: 2021-12-13
Payer: COMMERCIAL

## 2021-12-13 PROCEDURE — 97140 MANUAL THERAPY 1/> REGIONS: CPT

## 2021-12-13 NOTE — PROGRESS NOTES
[facility]  [address] [place], [address] [facility] [place]  Ph [phone]  Fx [phone]    Patient name: Gerhardt Bowling Start of Care: 10/18/2021   Referral source: Alva Olson MD : 1995   Medical/Treatment Diagnosis: Pelvic floor dysfunction [M62.89] Onset Date:at about 11 weeks ago   Prior Hospitalization: see medical history Provider#: [id number]   Medications: Verified on Patient Summary List     Comorbidities: PMHx/Surgical Hx: cardiac ablation (PVCs), pregnancy/vaginal delivery 2020; current pregnancy   Prior Level of Function: active lifestyle, not functionally limited, no pain with intercourse     Visits from Start of Care: 8                                             Missed Visits: 0         Updated Goals/Measure of Progress: To be achieved in 8 weeks:    Progress towards goals / Updated goals:  Short Term Goals: To be accomplished in 2 weeks:  Patient will report compliance with PSD HEP 3x/day to aid in rehabilitation program.  Status at IE: Pt was given and pt demonstrated and reported and understanding.   10/22/21: MET, patient reports compliance                  Patient will increase lumbar flexion ROM to greater than 6 inches from fibular head to aid in completion of ADLs  Status at IE: lacking lumbar flexion , fingertips approx 4 inches from fibular head (approx 10 inches from the ground).   Current:  2.5 inches proximal to fibular head.  (21)  Current:  10.5 inches fingertips to floor.  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   Status at IE:Pt reports the ability to walk for no more than 0-2 minutes without increased pain, reports 30 minute walk/stand increases to approx 7/10 pain.   2021 worst pain 6-7/10 by end of day with with lifting son  11/12 Worst pain 5/10 anytime she picks up her son (11/12/21)  12/13/2021 pain about the same as previos report, 5-6/10 with lifting and walking however has been able to walk and exercise more \"without getting flared up\"; PROGRESSING TOWARDS GOAL              Patient will report 50% decrease in pain complaints demonstrated by improved bed mobility (rolling over in bed) no more than 2/10 to improve quality of sleep. Status at eval: Patient reports 4/10 pain rolling over in bed every night.   Current:  Patient reports pain as 6/10 with rolling over bed and getting out of bed (11/12/21)  12/1/2021 Pt reports decreased LBP for several weeks but reports sharp 10/10 pain last night rolling over, spouse had to help her roll, then pain subsided  12/13/2021 progressing slowly towards goal, pt has had minimal 2/10 LBP for the last few weeks; primarily complaint pubic pain with lifting, walking and bed mobility, highest pain 6/10; PROGRESSING        Patient will be able to lift 30# from floor with proper mechanics and no report of pain  Status at eval: Patient unable to perform 25# carseat lifts and in/out of car without report of pain 5/10. Current:  Pt tolerated lifting 10# from her knee height to her waist with no increase in pain.  (10/29/21)  Current: Pt tolerated lifting 10# from 10 inches (about height of her son when in standing) but 15# increase her pain in lower abdomen 3/10.  12/13/2021 pt tolerating 25# lift with no increase in LBP however lifting son causing 5/10 pain; PROGRESSING TOWARDS GOAL        Patient will demonstrate independence with management tools and exercise program that are beneficial for current condition in order to feel comfortable with Pelvic floor PT D/C and not fear exacerbation of current condition or symptoms returning.    Status at eval : patient fearful of return to exercise and unaware of what activities to avoid to avoid exacerbation of current condition  12/13/2021 pt compliant with basic HEP and swiss ball progressions, reports she feels PT once/week is \"keeping me functional and in the last two weeks, it hasn't gotten any worse which I think is a really good thing\"; progressing towards goal    Summary of Care/ Key Functional Changes: Pt has made improvements in ROM and lifting tolerance; due to progressive nature of pregnancy, reports she is pleased with lack of increase in pain with ADLs and walking for exercise. Patient will continue to benefit from skilled PT services to modify and progress therapeutic interventions, address functional mobility deficits, address ROM deficits, address strength deficits, analyze and address soft tissue restrictions, analyze and cue movement patterns, analyze and modify body mechanics/ergonomics, assess and modify postural abnormalities and instruct in home and community integration to attain remaining goals    ASSESSMENT/RECOMMENDATIONS:  [x]Continue therapy per initial plan/protocol at a frequency of  1 x per week for 8 weeks  []Continue therapy with the following recommended changes:_____________________      _____________________________________________________________________  []Discontinue therapy progressing towards or have reached established goals  []Discontinue therapy due to lack of appreciable progress towards goals  []Discontinue therapy due to lack of attendance or compliance  []Await Physician's recommendations/decisions regarding therapy  []Other:________________________________________________________________    Thank you for this referral.   Nelda Blevins, PT 12/13/2021 12:43 PM  NOTE TO PHYSICIAN:  [address] TO [facility] Physical Therapy: [phone]  If you are unable to process this request in 24 hours please contact our office: [phone] I have read the above report and request that my patient continue as recommended.   ? I have read the above report and request that my patient continue therapy with the following changes/special instructions:___________________________________________________________  ? I have read the above report and request that my patient be discharged from therapy.

## 2021-12-13 NOTE — PROGRESS NOTES
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 States she has started adding ball exercises to her routine \"which has been helping and my pain has not been any worse than before, in the last 2 weeks, so that is good\"    OBJECTIVE    5 min Self Care: Thorough review progress towards goals to date and ed provided during review   Rationale:  Increase awareness and understanding of current condition to improve patients ability to independently and effectively attain goals and progress towards long term management of current condition. 40 min Manual Therapy:  MFR thoracolumbar fascia,   STM/TP release bilat QL, piriformis, Adductor, glut med, ITB/TFL  Long axis traction bilat s/l  KTape SIJ star and belly support  MET to correct left ant/right post innominate   Rationale: decrease pain, increase ROM, increase tissue extensibility, decrease trigger points and increase postural awareness in order to Improve ability to perform ADLs.             With   [] TE   [] TA   [] neuro   [] other: Patient Education: [x] Review HEP    [] Progressed/Changed HEP based on:   [] positioning   [] body mechanics   [] transfers   [] heat/ice application    [] other:      Other Objective/Functional Measures: left ant/right post innominate     Pain Level (0-10 scale) post treatment: 2/10    ASSESSMENT/Changes in Function: Patient tolerated today's session well with no c/o pain. Patient demonstrated understanding of today's instruction, education, and recommendations. Patient is progressing appropriately towards goals. Pt demo neutral pelvic landmarks following MT     Patient will continue to benefit from skilled PT services to modify and progress therapeutic interventions, address functional mobility deficits, address ROM deficits, address strength deficits, analyze and address soft tissue restrictions, analyze and cue movement patterns, analyze and modify body mechanics/ergonomics and assess and modify postural abnormalities to attain remaining goals. []  See Plan of Care  [x]  See progress note/recertification  []  See Discharge Summary         Progress towards goals / Updated goals:  Short Term Goals: To be accomplished in 2 weeks:  Patient will report compliance with PSD HEP 3x/day to aid in rehabilitation program.  Status at IE: Pt was given and pt demonstrated and reported and understanding.   10/22/21: MET, patient reports compliance                  Patient will increase lumbar flexion ROM to greater than 6 inches from fibular head to aid in completion of ADLs  Status at IE: lacking lumbar flexion , fingertips approx 4 inches from fibular head (approx 10 inches from the ground).   Current:  2.5 inches proximal to fibular head.  (11/12/21)  Current:  10.5 inches fingertips to floor.  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   Status at IE:Pt reports the ability to walk for no more than 0-2 minutes without increased pain, reports 30 minute walk/stand increases to approx 7/10 pain.   11/5/2021 worst pain 6-7/10 by end of day with with lifting son  11/12 Worst pain 5/10 anytime she picks up her son (11/12/21)  12/13/2021 pain about the same as previos report, 5-6/10 with lifting and walking however has been able to walk and exercise more \"without getting flared up\"; PROGRESSING TOWARDS GOAL             Patient will report 50% decrease in pain complaints demonstrated by improved bed mobility (rolling over in bed) no more than 2/10 to improve quality of sleep. Status at eval: Patient reports 4/10 pain rolling over in bed every night.   Current:  Patient reports pain as 6/10 with rolling over bed and getting out of bed (11/12/21)  12/1/2021 Pt reports decreased LBP for several weeks but reports sharp 10/10 pain last night rolling over, spouse had to help her roll, then pain subsided  12/13/2021 progressing slowly towards goal, pt has had minimal 2/10 LBP for the last few weeks; primarily complaint pubic pain with lifting, walking and bed mobility, highest pain 6/10; PROGRESSING       Patient will be able to lift 30# from floor with proper mechanics and no report of pain  Status at eval: Patient unable to perform 25# carseat lifts and in/out of car without report of pain 5/10. Current:  Pt tolerated lifting 10# from her knee height to her waist with no increase in pain.  (10/29/21)  Current: Pt tolerated lifting 10# from 10 inches (about height of her son when in standing) but 15# increase her pain in lower abdomen 3/10.  12/13/2021 pt tolerating 25# lift with no increase in LBP however lifting son causing 5/10 pain; PROGRESSING TOWARDS GOAL       Patient will demonstrate independence with management tools and exercise program that are beneficial for current condition in order to feel comfortable with Pelvic floor PT D/C and not fear exacerbation of current condition or symptoms returning.    Status at eval : patient fearful of return to exercise and unaware of what activities to avoid to avoid exacerbation of current condition  12/13/2021 pt compliant with basic HEP and swiss ball progressions, reports she feels PT once/week is \"keeping me functional and in the last two weeks, it hasn't gotten any worse which I think is a really good thing\"; progressing towards goal    PLAN  []  Upgrade activities as tolerated     [x]  Continue plan of care  [x]  Update interventions per flow sheet       []  Discharge due to:_  []  Other:_      Palmer Cope, PT 12/13/2021  8:45 AM    Future Appointments   Date Time Provider Viraj Ramirez   12/20/2021  8:00 AM Salome To, PT [facility]   12/27/2021  3:30 PM Raquel Craft, PT [facility]

## 2021-12-19 NOTE — PROGRESS NOTES
Assumed care of pt.  0815-lactation in room. 0850-assessment completed. Denies needs. 1020-awake in bed. Denies needs. 1125-discharge instructions reviewed with pt who verbalized understanding and signed. 1235-discharged home with infant via wheelchair. Private car

## 2021-12-20 ENCOUNTER — HOSPITAL ENCOUNTER (OUTPATIENT)
Dept: PHYSICAL THERAPY | Age: 26
Discharge: HOME | End: 2021-12-20
Payer: COMMERCIAL

## 2021-12-20 PROCEDURE — 97140 MANUAL THERAPY 1/> REGIONS: CPT

## 2021-12-20 NOTE — PROGRESS NOTES
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 Patient states \"my back and hips are feeling really good\" but reports some increase in obturator \"locking up\" in the last day or two. OBJECTIVE      40 min Manual Therapy:  MFR thoracolumbar fascia,   STM/TP release bilat QL, piriformis, Adductor, glut med, ITB/TFL  Manual psoas stretch bilat s/l  KTape SIJ star and belly support  bilat OI TP release   Rationale: decrease pain, increase ROM, increase tissue extensibility, decrease trigger points and increase postural awareness in order to Improve ability to perform ADLs. With   [] TE   [] TA   [] neuro   [x] other: Patient Education: [x] Review HEP    [] Progressed/Changed HEP based on:   [] positioning   [] body mechanics   [] transfers   [] heat/ice application    [] other:         Pain Level (0-10 scale) post treatment: 1/10    ASSESSMENT/Changes in Function: Patient tolerated today's session well with no c/o pain. Patient demonstrated understanding of today's instruction, education, and recommendations. Patient is progressing appropriately towards goals.       Patient will continue to benefit from skilled PT services to modify and progress therapeutic interventions, address functional mobility deficits, address ROM deficits, address strength deficits, analyze and address soft tissue restrictions, analyze and cue movement patterns, analyze and modify body mechanics/ergonomics and assess and modify postural abnormalities to attain remaining goals. [x]  See Plan of Care  []  See progress note/recertification  []  See Discharge Summary         Progress towards goals / Updated goals:  Progress towards goals / Updated goals:  Short Term Goals: To be accomplished in 2 weeks:  Patient will report compliance with PSD HEP 3x/day to aid in rehabilitation program.  Status at IE: Pt was given and pt demonstrated and reported and understanding.   10/22/21: MET, patient reports compliance                  Patient will increase lumbar flexion ROM to greater than 6 inches from fibular head to aid in completion of ADLs  Status at IE: lacking lumbar flexion , fingertips approx 4 inches from fibular head (approx 10 inches from the ground).   Current:  2.5 inches proximal to fibular head.  (11/12/21)  Current:  10.5 inches fingertips to floor.  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 Status at IE:Pt reports the ability to walk for no more than 0-2 minutes without increased pain, reports 30 minute walk/stand increases to approx 7/10 pain.   11/5/2021 worst pain 6-7/10 by end of day with with lifting son  11/12 Worst pain 5/10 anytime she picks up her son (11/12/21)  12/13/2021 pain about the same as previos report, 5-6/10 with lifting and walking however has been able to walk and exercise more \"without getting flared up\";  PROGRESSING TOWARDS GOAL              Patient will report 50% decrease in pain complaints demonstrated by improved bed mobility (rolling over in bed) no more than 2/10 to improve quality of sleep.  Status at eval: Patient reports 4/10 pain rolling over in bed every night.   Current:  Patient reports pain as 6/10 with rolling over bed and getting out of bed (11/12/21)  12/1/2021 Pt reports decreased LBP for several weeks but reports sharp 10/10 pain last night rolling over, spouse had to help her roll, then pain subsided  12/13/2021 progressing slowly towards goal, pt has had minimal 2/10 LBP for the last few weeks; primarily complaint pubic pain with lifting, walking and bed mobility, highest pain 6/10; PROGRESSING        Patient will be able to lift 30# from floor with proper mechanics and no report of pain  Status at eval: Patient unable to perform 25# carseat lifts and in/out of car without report of pain 5/10. Current:  Pt tolerated lifting 10# from her knee height to her waist with no increase in pain.  (10/29/21)  Current: Pt tolerated lifting 10# from 10 inches (about height of her son when in standing) but 15# increase her pain in lower abdomen 3/10.  12/13/2021 pt tolerating 25# lift with no increase in LBP however lifting son causing 5/10 pain; PROGRESSING TOWARDS GOAL        Patient will demonstrate independence with management tools and exercise program that are beneficial for current condition in order to feel comfortable with Pelvic floor PT D/C and not fear exacerbation of current condition or symptoms returning.    Status at eval : patient fearful of return to exercise and unaware of what activities to avoid to avoid exacerbation of current condition  12/13/2021 pt compliant with basic HEP and swiss ball progressions, reports she feels PT once/week is \"keeping me functional and in the last two weeks, it hasn't gotten any worse which I think is a really good thing\"; progressing towards goal     Summary of Care/ Key Functional Changes: Pt has made improvements in ROM and lifting tolerance; due to progressive nature of pregnancy, reports she is pleased with lack of increase in pain with ADLs and walking for exercise.    Patient will continue to benefit from skilled PT services to modify and progress therapeutic interventions, address functional mobility deficits, address ROM deficits, address strength deficits, analyze and address soft tissue restrictions, analyze and cue movement patterns, analyze and modify body mechanics/ergonomics, assess and modify postural abnormalities and instruct in home and community integration to attain remaining goals       PLAN  []  Upgrade activities as tolerated     [x]  Continue plan of care  [x]  Update interventions per flow sheet       []  Discharge due to:_  []  Other:_  Continue MT (check OI and adductors), MET?, add glut strengthening/ball stabs to OLGA LIDIA Dumas PT 12/20/2021  8:05 AM    Future Appointments   Date Time Provider Viraj Ramirez   12/27/2021  3:30 PM Tamy Guerrero PT [facility]   1/7/2022  8:00 AM Tamy Guerrero PT [facility]   1/14/2022  8:00 AM Justus Barriga PT [facility]

## 2021-12-27 ENCOUNTER — HOSPITAL ENCOUNTER (OUTPATIENT)
Dept: PHYSICAL THERAPY | Age: 26
Discharge: HOME | End: 2021-12-27
Payer: COMMERCIAL

## 2021-12-27 PROCEDURE — 97140 MANUAL THERAPY 1/> REGIONS: CPT

## 2021-12-27 NOTE — PROGRESS NOTES
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 Patient states \"I have really been feeling pretty good\"     OBJECTIVE      40 min Manual Therapy:  MFR thoracolumbar fascia,   STM/TP release bilat QL, piriformis, Adductor, glut med, ITB/TFL  Manual psoas stretch bilat s/l  KTape SIJ star and belly support  bilat OI TP release   Rationale: decrease pain, increase ROM, increase tissue extensibility, decrease trigger points and increase postural awareness in order to Improve ability to perform ADLs. With   [] TE   [] TA   [] neuro   [] other: Patient Education: [x] Review HEP    [] Progressed/Changed HEP based on:   [] positioning   [] body mechanics   [] transfers   [] heat/ice application    [] other:         Pain Level (0-10 scale) post treatment: 1-2/10    ASSESSMENT/Changes in Function: Patient tolerated today's session well with no c/o pain. Patient demonstrated understanding of today's instruction, education, and recommendations. Patient is progressing appropriately towards goals.       Patient will continue to benefit from skilled PT services to modify and progress therapeutic interventions, address functional mobility deficits, address ROM deficits, address strength deficits, analyze and address soft tissue restrictions, analyze and cue movement patterns, analyze and modify body mechanics/ergonomics and assess and modify postural abnormalities to attain remaining goals. [x]  See Plan of Care  []  See progress note/recertification  []  See Discharge Summary         Progress towards goals / Updated goals:  Progress towards goals / Updated goals:  Short Term Goals: To be accomplished in 2 weeks:  Patient will report compliance with PSD HEP 3x/day to aid in rehabilitation program.  Status at IE: Pt was given and pt demonstrated and reported and understanding.   10/22/21: MET, patient reports compliance                  Patient will increase lumbar flexion ROM to greater than 6 inches from fibular head to aid in completion of ADLs  Status at IE: lacking lumbar flexion , fingertips approx 4 inches from fibular head (approx 10 inches from the ground).   Current:  2.5 inches proximal to fibular head.  (11/12/21)  Current:  10.5 inches fingertips to floor.  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 Status at IE:Pt reports the ability to walk for no more than 0-2 minutes without increased pain, reports 30 minute walk/stand increases to approx 7/10 pain.   11/5/2021 worst pain 6-7/10 by end of day with with lifting son  11/12 Worst pain 5/10 anytime she picks up her son (11/12/21)  12/13/2021 pain about the same as previos report, 5-6/10 with lifting and walking however has been able to walk and exercise more \"without getting flared up\"; PROGRESSING TOWARDS GOAL              Patient will report 50% decrease in pain complaints demonstrated by improved bed mobility (rolling over in bed) no more than 2/10 to improve quality of sleep.   Status at eval: Patient reports 4/10 pain rolling over in bed every night.   Current:  Patient reports pain as 6/10 with rolling over bed and getting out of bed (11/12/21)  12/1/2021 Pt reports decreased LBP for several weeks but reports sharp 10/10 pain last night rolling over, spouse had to help her roll, then pain subsided  12/13/2021 progressing slowly towards goal, pt has had minimal 2/10 LBP for the last few weeks; primarily complaint pubic pain with lifting, walking and bed mobility, highest pain 6/10; PROGRESSING  12/27/2021 5/10 worst pain; no LBP and \"tolerable pubic pain\" generally 2-3/10        Patient will be able to lift 30# from floor with proper mechanics and no report of pain  Status at eval: Patient unable to perform 25# carseat lifts and in/out of car without report of pain 5/10. Current:  Pt tolerated lifting 10# from her knee height to her waist with no increase in pain.  (10/29/21)  Current: Pt tolerated lifting 10# from 10 inches (about height of her son when in standing) but 15# increase her pain in lower abdomen 3/10.  12/13/2021 pt tolerating 25# lift with no increase in LBP however lifting son causing 5/10 pain; PROGRESSING TOWARDS GOAL        Patient will demonstrate independence with management tools and exercise program that are beneficial for current condition in order to feel comfortable with Pelvic floor PT D/C and not fear exacerbation of current condition or symptoms returning.    Status at eval : patient fearful of return to exercise and unaware of what activities to avoid to avoid exacerbation of current condition  12/13/2021 pt compliant with basic HEP and swiss ball progressions, reports she feels PT once/week is \"keeping me functional and in the last two weeks, it hasn't gotten any worse which I think is a really good thing\"; progressing towards goal    PLAN  []  Upgrade activities as tolerated     [x]  Continue plan of care  [x]  Update interventions per flow sheet       []  Discharge due to:_  []  Other:_  Continue MT (check OI and adductors), MET?, add glut strengthening/ball stabs to HEP    Seun Organ, PT 12/27/2021  3:35 PM    Future Appointments   Date Time Provider Viraj Ramirez   1/7/2022  8:00 AM Rohan To, [address] [facility]   1/14/2022  8:00 AM Taylor Barbosa, [address] [facility]

## 2022-01-07 ENCOUNTER — HOSPITAL ENCOUNTER (OUTPATIENT)
Dept: PHYSICAL THERAPY | Age: 27
Discharge: HOME | End: 2022-01-07
Payer: COMMERCIAL

## 2022-01-07 PROCEDURE — 97535 SELF CARE MNGMENT TRAINING: CPT

## 2022-01-07 PROCEDURE — 97140 MANUAL THERAPY 1/> REGIONS: CPT

## 2022-01-07 NOTE — PROGRESS NOTES
[facility]  [address], [address] [facility] [place]  Ph [phone]  Fx [phone]    Pelvic Floor Progress Note  Patient name: Nona Gilman Start of Care: 10/18/2021   Referral Jacqui Spence MD : 1995   Medical/Treatment Diagnosis: Pelvic floor dysfunction [M62.89] Onset Date:at about 11 weeks ago   Prior Hospitalization: see medical history Provider#: [id number]   Medications: Verified on Patient Summary List     Comorbidities: PMHx/Surgical Hx: cardiac ablation (PVCs), pregnancy/vaginal delivery 2020; current pregnancy   Prior Level of Function: active lifestyle, not functionally limited, no pain with intercourse    Visits from Start of Care: 11    Missed Visits: 0    Updated Goals/Measure of Progress: To be achieved in 8 weeks:  Short Term Goals: To be accomplished in 2 weeks:  Patient will report compliance with PSD HEP 3x/day to aid in rehabilitation program.  Status at IE: Pt was given and pt demonstrated and reported and understanding.   10/22/21: MET, patient reports compliance                  Patient will increase lumbar flexion ROM to greater than 6 inches from fibular head to aid in completion of ADLs  Status at IE: lacking lumbar flexion , fingertips approx 4 inches from fibular head (approx 10 inches from the ground).   Current:  2.5 inches proximal to fibular head.  (21)  Current:  10.5 inches fingertips to floor.  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   Status at IE:Pt reports the ability to walk for no more than 0-2 minutes without increased pain, reports 30 minute walk/stand increases to approx 7/10 pain.   2021 worst pain 6-7/10 by end of day with with lifting son   Worst pain 5/10 anytime she picks up her son (11/12/21)  12/13/2021 pain about the same as previos report, 5-6/10 with lifting and walking however has been able to walk and exercise more \"without getting flared up\"; PROGRESSING TOWARDS GOAL  1/7/22: PROGRESSING  patient is able to stand/walk for 30 minutes with c/o 4-5/10 pain              Patient will report 50% decrease in pain complaints demonstrated by improved bed mobility (rolling over in bed) no more than 2/10 to improve quality of sleep. Status at eval: Patient reports 4/10 pain rolling over in bed every night.   Current:  Patient reports pain as 6/10 with rolling over bed and getting out of bed (11/12/21)  12/1/2021 Pt reports decreased LBP for several weeks but reports sharp 10/10 pain last night rolling over, spouse had to help her roll, then pain subsided  12/13/2021 progressing slowly towards goal, pt has had minimal 2/10 LBP for the last few weeks; primarily complaint pubic pain with lifting, walking and bed mobility, highest pain 6/10; PROGRESSING  12/27/2021 5/10 worst pain; no LBP and \"tolerable pubic pain\" generally 2-3/10  1/7/22: continues to report worst pain at 7-8/10 pain        Patient will be able to lift 30# from floor with proper mechanics and no report of pain  Status at [facility]: Patient unable to perform 25# carseat lifts and in/out of car without report of pain 5/10.   Current:  Pt tolerated lifting 10# from her knee height to her waist with no increase in pain.  (10/29/21)  Current: Pt tolerated lifting 10# from 10 inches (about height of her son when in standing) but 15# increase her pain in lower abdomen 3/10.  12/13/2021 pt tolerating 25# lift with no increase in LBP however lifting son causing 5/10 pain; PROGRESSING TOWARDS GOAL  1/7/22: patient able to lift 23# lb son with c/o \"uncomfortable\" PROGRESSING        Patient will demonstrate independence with management tools and exercise program that are beneficial for current condition in order to feel comfortable with Pelvic floor PT D/C and not fear exacerbation of current condition or symptoms returning. Status at eval : patient fearful of return to exercise and unaware of what activities to avoid to avoid exacerbation of current condition  12/13/2021 pt compliant with basic HEP and swiss ball progressions, reports she feels PT once/week is \"keeping me functional and in the last two weeks, it hasn't gotten any worse which I think is a really good thing\"; progressing towards goal  1/7/22: PROGRESSING, patient is compliant with HEP and reports that therapy is helping her to maintain her current QOL    Key Functional Changes:            Excellent Good         Limited           None  [x] Increase Pelvic MM strength       []  [x]  []  []  [] Decrease Pelvic MM hypertonus     []  []  []  []  [] Decrease Incontinence Episodes    []  []  []  []   [] Improve Voiding Habits        []  []  []  []   [] Decreased Urgency        []  []  []  []  [x] Decrease Pelvic Pain        []  []  [x]  []      ASSESSMENT/RECOMMENDATIONS:  Patient is making appreciable progress towards goals. Patient is currently x31 weeks pregnant. Patient reports that pelvic floor PT has been helpful in maintaining her QOL as well as easing with work and childcare duties. Patient is being complaint with HEP. Patient continues to reports pubic symphysis pain as well as difficulty with prolonged standing/ambulation. Patient would benefit from continued, skilled pelvic floor PT, 1x8 weeks.     [x]Continue therapy per initial plan/protocol at a frequency of  1 x per week for 8 weeks  []Continue therapy with the following recommended changes:_____________________      _____________________________________________________________________  []Discontinue therapy progressing towards or have reached established goals  []Discontinue therapy due to lack of appreciable progress towards goals  []Discontinue therapy due to lack of attendance or compliance  []Await Physician's recommendations/decisions regarding therapy  []Other:________________________________________________________________    Thank you for this referral.   Sisi Bruce 1/7/2022 8:44 AM  NOTE TO PHYSICIAN:  PLEASE COMPLETE THE ORDERS BELOW AND   FAX TO [facility]: [phone]  If you are unable to process this request in 24 hours please contact our office: [phone]      ? I have read the above report and request that my patient continue as recommended. ? I have read the above report and request that my patient continue therapy with the following changes/special instructions:___________________________________________________________  ? I have read the above report and request that my patient be discharged from therapy.

## 2022-01-07 NOTE — PROGRESS NOTES
PT DAILY TREATMENT NOTE    Patient Name: Rosa Smiley  [id number]  : 1995  [x]  Patient  Verified  Payor: Ofe Kinney / Plan: Javi Lewis / Product Type: HMO /    In time:8:10 AM  Out time:8:43 AM  Total Treatment Time (min): 33  Total Timed Codes (min): 33  1:1 Treatment Time ( [address] only): 33   Visit #: 11 of 14    Treatment Area: Other specified disorders of muscle [M62.89]    SUBJECTIVE  Pain Level (0-10 scale): 3/10  Any medication changes, allergies to medications, adverse drug reactions, diagnosis change, or new procedure performed?: [x] No    [] Yes (see summary sheet for update)  Subjective functional status/changes:   [] No changes reported  Patient reports: good compliance with current HEP. Patient reports she had brief [place] contractions last night, but that they stopped. She reports that OBGYN is aware of this. OBJECTIVE        10 min Self Care: Thorough review and handout provided on the following:   KT tape SIJ star for stability and KT tape abdominal support   Rationale:  Increase awareness and understanding of current condition to improve patients ability to independently and effectively attain goals and progress towards long term management of current condition. 23 min Manual Therapy: In right and left s/l:  -STM/DTM to bilateral QLs, upper glutes, and lateral ITB   Rationale: decrease pain, increase ROM and increase tissue extensibility in order to Increase urinary continence, Decrease urinary urgency, Increase ability to delay urination, Decrease frequency of urination, Decrease nocturia, Increase fecal continence, Decrease bowel urgency, Improve frequency and ease of bowel movements, Improve ability to engage in sexual intercourse, Improve ability to undergo a gynecological exam and Improve ability to perform ADLs.             With   [] TE   [] TA   [] neuro   [] other: Patient Education: [x] Review HEP    [] Progressed/Changed HEP based on:   [] positioning   [] body mechanics   [] transfers   [] heat/ice application    [] other:      Other Objective/Functional Measures:    -patient x10 minutes tardy for appointment    Pain Level (0-10 scale) post treatment: 3/10    ASSESSMENT/Changes in Function: Patient tolerated today's session well with no c/o pain. Patient demonstrated understanding of today's instruction, education, and recommendations. Patient is progressing appropriately towards goals. Patient is making appreciable progress towards goals. Patient is currently x31 weeks pregnant. Patient reports that pelvic floor PT has been helpful in maintaining her QOL as well as easing with work and childcare duties. Patient is being complaint with HEP. Patient continues to reports pubic symphysis pain as well as difficulty with prolonged standing/ambulation. Patient would benefit from continued, skilled pelvic floor PT, 1x8 weeks. Patient will continue to benefit from skilled PT services to modify and progress therapeutic interventions, address functional mobility deficits, address ROM deficits, address strength deficits, analyze and address soft tissue restrictions, analyze and cue movement patterns, analyze and modify body mechanics/ergonomics, assess and modify postural abnormalities and instruct in home and community integration to attain remaining goals.      [x]  See Plan of Care  [x]  See progress note/recertification  []  See Discharge Summary         Progress towards goals / Updated goals:  Short Term Goals: To be accomplished in 2 weeks:  Patient will report compliance with PSD HEP 3x/day to aid in rehabilitation program.  Status at IE: Pt was given and pt demonstrated and reported and understanding.   10/22/21: MET, patient reports compliance                  Patient will increase lumbar flexion ROM to greater than 6 inches from fibular head to aid in completion of ADLs  Status at IE: lacking lumbar flexion , fingertips approx 4 inches from fibular head (approx 10 inches from the ground).   Current:  2.5 inches proximal to fibular head.  (11/12/21)  Current:  10.5 inches fingertips to floor.  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 Status at IE:Pt reports the ability to walk for no more than 0-2 minutes without increased pain, reports 30 minute walk/stand increases to approx 7/10 pain.   11/5/2021 worst pain 6-7/10 by end of day with with lifting son  11/12 Worst pain 5/10 anytime she picks up her son (11/12/21)  12/13/2021 pain about the same as previos report, 5-6/10 with lifting and walking however has been able to walk and exercise more \"without getting flared up\"; PROGRESSING TOWARDS GOAL  1/7/22: PROGRESSING  patient is able to stand/walk for 30 minutes with c/o 4-5/10 pain              Patient will report 50% decrease in pain complaints demonstrated by improved bed mobility (rolling over in bed) no more than 2/10 to improve quality of sleep.   Status at eval: Patient reports 4/10 pain rolling over in bed every night.   Current:  Patient reports pain as 6/10 with rolling over bed and getting out of bed (11/12/21)  12/1/2021 Pt reports decreased LBP for several weeks but reports sharp 10/10 pain last night rolling over, spouse had to help her roll, then pain subsided  12/13/2021 progressing slowly towards goal, pt has had minimal 2/10 LBP for the last few weeks; primarily complaint pubic pain with lifting, walking and bed mobility, highest pain 6/10; PROGRESSING  12/27/2021 5/10 worst pain; no LBP and \"tolerable pubic pain\" generally 2-3/10  1/7/22: continues to report worst pain at 7-8/10 pain        Patient will be able to lift 30# from floor with proper mechanics and no report of pain  Status at [facility]: Patient unable to perform 25# carseat lifts and in/out of car without report of pain 5/10. Current:  Pt tolerated lifting 10# from her knee height to her waist with no increase in pain.  (10/29/21)  Current: Pt tolerated lifting 10# from 10 inches (about height of her son when in standing) but 15# increase her pain in lower abdomen 3/10.  12/13/2021 pt tolerating 25# lift with no increase in LBP however lifting son causing 5/10 pain; PROGRESSING TOWARDS GOAL  1/7/22: patient able to lift 23# lb son with c/o \"uncomfortable\" PROGRESSING        Patient will demonstrate independence with management tools and exercise program that are beneficial for current condition in order to feel comfortable with Pelvic floor PT D/C and not fear exacerbation of current condition or symptoms returning. Status at [facility] : patient fearful of return to exercise and unaware of what activities to avoid to avoid exacerbation of current condition  12/13/2021 pt compliant with basic HEP and swiss ball progressions, reports she feels PT once/week is \"keeping me functional and in the last two weeks, it hasn't gotten any worse which I think is a really good thing\"; progressing towards goal  1/7/22: PROGRESSING, patient is compliant with HEP and reports that therapy is helping her to maintain her current QOL     PLAN  []? Upgrade activities as tolerated     [x]? Continue plan of care  [x]? Update interventions per flow sheet       []? Discharge due to:_  [x]?   Other:_  Continue MT (check OI and adductors), MET?, add glut strengthening/ball stabs to HEP        Augusto Malone 1/7/2022  7:48 AM    Future Appointments   Date Time Provider Viraj Ramirez   1/7/2022  8:00 AM Ludin Quintanilla [facility]   1/14/2022  8:00 AM Whit Rich PT [facility]

## 2022-01-14 ENCOUNTER — TRANSCRIBE ORDER (OUTPATIENT)
Dept: REGISTRATION | Age: 27
End: 2022-01-14

## 2022-01-14 ENCOUNTER — HOSPITAL ENCOUNTER (OUTPATIENT)
Dept: NON INVASIVE DIAGNOSTICS | Age: 27
Discharge: HOME | End: 2022-01-14
Payer: COMMERCIAL

## 2022-01-14 ENCOUNTER — HOSPITAL ENCOUNTER (OUTPATIENT)
Dept: PHYSICAL THERAPY | Age: 27
Discharge: HOME | End: 2022-01-14
Payer: COMMERCIAL

## 2022-01-14 DIAGNOSIS — R00.8 OTHER ABNORMALITIES OF HEART BEAT: Primary | ICD-10-CM

## 2022-01-14 DIAGNOSIS — R00.8 OTHER ABNORMALITIES OF HEART BEAT: ICD-10-CM

## 2022-01-14 LAB
ATRIAL RATE: 71 BPM
CALCULATED P AXIS, ECG09: 48 DEGREES
CALCULATED R AXIS, ECG10: 61 DEGREES
CALCULATED T AXIS, ECG11: 49 DEGREES
DIAGNOSIS, 93000: NORMAL
P-R INTERVAL, ECG05: 128 MS
Q-T INTERVAL, ECG07: 374 MS
QRS DURATION, ECG06: 88 MS
QTC CALCULATION (BEZET), ECG08: 406 MS
VENTRICULAR RATE, ECG03: 71 BPM

## 2022-01-14 PROCEDURE — 97140 MANUAL THERAPY 1/> REGIONS: CPT

## 2022-01-14 PROCEDURE — 93005 ELECTROCARDIOGRAM TRACING: CPT

## 2022-01-14 PROCEDURE — 97112 NEUROMUSCULAR REEDUCATION: CPT

## 2022-01-14 NOTE — PROGRESS NOTES
PT DAILY TREATMENT NOTE    Patient Name: Mirna Mariee  Date:2022  : 1995  [x]  Patient  Verified  Payor: Bhumika Sifuentes / Plan: Pedro Pablo Martin / Product Type: HMO /    In time:800  Out time:849  Total Treatment Time (min): 49  Total Timed Codes (min): 49  1:1 Treatment Time ([address] only): 49   Visit #: 12 of 20    Treatment Area: Other specified disorders of muscle [M62.89]    SUBJECTIVE  Pain Level (0-10 scale): 4/10  Any medication changes, allergies to medications, adverse drug reactions, diagnosis change, or new procedure performed?: [x] No    [] Yes (see summary sheet for update)  Subjective functional status/changes:   [] No changes reported  Patient reports: good  compliance with current HEP. OBJECTIVE      49 min Manual Therapy:  STM, trigger point, and myofascial release to QL, TFL, glut medius, and right obturator internus   Rationale: decrease pain, increase ROM and increase tissue extensibility in order to Improve ability to perform ADLs and  activities with increased ease. With   [] TE   [] TA   [] neuro   [] other: Patient Education: [x] Review HEP    [] Progressed/Changed HEP based on:   [] positioning   [] body mechanics   [] transfers   [] heat/ice application    [] other:         Pain Level (0-10 scale) post treatment: 4/10    ASSESSMENT/Changes in Function: Patient tolerated today's session well with no c/o pain. Patient demonstrated understanding of today's instruction, education, and recommendations. Patient is progressing appropriately towards goals. Patient is 32 weeks into gestation. She continues to report muscle \"tightness\" and \"soreness\" in right hip. Trigger points were addressed in right and left gluteus medius and right obturator internus release.     Patient will continue to benefit from skilled PT services to modify and progress therapeutic interventions, address functional mobility deficits, address ROM deficits, address strength deficits, analyze and address soft tissue restrictions, analyze and cue movement patterns and assess and modify postural abnormalities to attain remaining goals. [x]  See Plan of Care  []  See progress note/recertification  []  See Discharge Summary         Progress towards goals / Updated goals:  Short Term Goals: To be accomplished in 2 weeks:  Patient will report compliance with PSD HEP 3x/day to aid in rehabilitation program.  Status at IE: Pt was given and pt demonstrated and reported and understanding.   10/22/21: MET, patient reports compliance                  Patient will increase lumbar flexion ROM to greater than 6 inches from fibular head to aid in completion of ADLs  Status at IE: lacking lumbar flexion , fingertips approx 4 inches from fibular head (approx 10 inches from the ground).   Current:  2.5 inches proximal to fibular head.  (11/12/21)  Current:  10.5 inches fingertips to floor.  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 Status at IE:Pt reports the ability to walk for no more than 0-2 minutes without increased pain, reports 30 minute walk/stand increases to approx 7/10 pain.   11/5/2021 worst pain 6-7/10 by end of day with with lifting son  11/12 Worst pain 5/10 anytime she picks up her son (11/12/21)  12/13/2021 pain about the same as previos report, 5-6/10 with lifting and walking however has been able to walk and exercise more \"without getting flared up\"; PROGRESSING TOWARDS GOAL  1/7/22: Catr[place] Ring is able to stand/walk for 30 minutes with c/o 4-5/10 pain              Patient will report 50% decrease in pain complaints demonstrated by improved bed mobility (rolling over in bed) no more than 2/10 to improve quality of sleep.   Status at eval: Patient reports 4/10 pain rolling over in bed every night.   Current:  Patient reports pain as 6/10 with rolling over bed and getting out of bed (11/12/21)  12/1/2021 Pt reports decreased LBP for several weeks but reports sharp 10/10 pain last night rolling over, spouse had to help her roll, then pain subsided  12/13/2021 progressing slowly towards goal, pt has had minimal 2/10 LBP for the last few weeks; primarily complaint pubic pain with lifting, walking and bed mobility, highest pain 6/10; PROGRESSING  12/27/2021 5/10 worst pain; no LBP and \"tolerable pubic pain\" generally 2-3/10  1/7/22: continues to report worst pain at 7-8/10 pain  1/14/22  Patient reports decrease in pain to 4/10 1/14/2022 Progressing        Patient will be able to lift 30# from floor with proper mechanics and no report of pain  Status at eval: Patient unable to perform 25# carseat lifts and in/out of car without report of pain 5/10. Current:  Pt tolerated lifting 10# from her knee height to her waist with no increase in pain.  (10/29/21)  Current: Pt tolerated lifting 10# from 10 inches (about height of her son when in standing) but 15# increase her pain in lower abdomen 3/10.  12/13/2021 pt tolerating 25# lift with no increase in LBP however lifting son causing 5/10 pain; PROGRESSING TOWARDS GOAL  1/7/22: patient able to lift 23# lb son with c/o \"uncomfortable\" PROGRESSING        Patient will demonstrate independence with management tools and exercise program that are beneficial for current condition in order to feel comfortable with Pelvic floor PT D/C and not fear exacerbation of current condition or symptoms returning.    Status at eval : patient fearful of return to exercise and unaware of what activities to avoid to avoid exacerbation of current condition  12/13/2021 pt compliant with basic HEP and swiss ball progressions, reports she feels PT once/week is \"keeping me functional and in the last two weeks, it hasn't gotten any worse which I think is a really good thing\"; progressing towards goal  1/7/22: PROGRESSING, patient is compliant with HEP and reports that therapy is helping her to maintain her current QOL         PLAN  []  Upgrade activities as tolerated     [x]  Continue plan of care  [x]  Update interventions per flow sheet       []  Discharge due to:_  []  Other:_      Winter Ocampo PT 1/14/2022  8:57 AM    Future Appointments   Date Time Provider Viraj Ramirez   1/21/2022  8:00 AM Isaias Johnson PT [facility]   1/28/2022  1:15 PM Isaias Johnson PT [facility]   2/4/2022  8:00 AM Isaias Johnson PT [facility]   2/11/2022  8:00 AM Isaias Johnson PT [facility]   2/16/2022  8:00 AM Edwar Jesus [facility]   2/25/2022  8:00 AM Isaias Johnson PT [facility]

## 2022-01-21 ENCOUNTER — APPOINTMENT (OUTPATIENT)
Dept: PHYSICAL THERAPY | Age: 27
End: 2022-01-21
Payer: COMMERCIAL

## 2022-01-21 ENCOUNTER — HOSPITAL ENCOUNTER (OUTPATIENT)
Dept: PHYSICAL THERAPY | Age: 27
Discharge: HOME | End: 2022-01-21
Payer: COMMERCIAL

## 2022-01-21 PROCEDURE — 97110 THERAPEUTIC EXERCISES: CPT

## 2022-01-21 PROCEDURE — 97530 THERAPEUTIC ACTIVITIES: CPT

## 2022-01-21 PROCEDURE — 97140 MANUAL THERAPY 1/> REGIONS: CPT

## 2022-01-21 NOTE — PROGRESS NOTES
PT DAILY TREATMENT NOTE    Patient Name: Kaitlin Pablo  Date:2022  : 1995  [x]  Patient  Verified  Payor: Lio Phillips / Plan: Didi Jang / Product Type: HMO /    In time:1145  Out time:1230  Total Treatment Time (min): 45  Total Timed Codes (min): 45  1:1 Treatment Time ([address] only): 45   Visit #: 13 of 20    Treatment Area: Other specified disorders of muscle [M62.89]    SUBJECTIVE  Pain Level (0-10 scale): 4/10  Any medication changes, allergies to medications, adverse drug reactions, diagnosis change, or new procedure performed?: [x] No    [] Yes (see summary sheet for update)  Subjective functional status/changes:   [] No changes reported  Patient reports: good compliance with current HEP. Patient reports doing exercises at physical therapy flares her symptoms    OBJECTIVE      8 min Therapeutic Activity:  [x]  See flow sheet :  Functional lifts with 10#, 15#, and 20#        37 min Manual Therapy:  Trigger point release and MFR of hip adductors, piriformis, and glut med   Rationale: decrease pain, increase ROM, increase tissue extensibility and decrease trigger points in order to Improve ability to perform ADLs. With   [] TE   [] TA   [] neuro   [] other: Patient Education: [x] Review HEP    [] Progressed/Changed HEP based on:   [] positioning   [] body mechanics   [] transfers   [] heat/ice application    [] other:           Pain Level (0-10 scale) post treatment: 4/10    ASSESSMENT/Changes in Function: Patient tolerated today's session well with no c/o pain. Patient demonstrated understanding of today's instruction, education, and recommendations. Patient is progressing appropriately towards goals. Patient is 34 weeks into her pregnancy. She continues to have pubic symphysis pain that she rates as 4/10. Patient reports being compliant at home with her exercises.     Patient will continue to benefit from skilled PT services to modify and progress therapeutic interventions, address functional mobility deficits, address ROM deficits, address strength deficits, analyze and address soft tissue restrictions, analyze and cue movement patterns, analyze and modify body mechanics/ergonomics and assess and modify postural abnormalities to attain remaining goals. [x]  See Plan of Care  []  See progress note/recertification  []  See Discharge Summary         Progress towards goals / Updated goals:  Short Term Goals: To be accomplished in 2 weeks:  Patient will report compliance with PSD HEP 3x/day to aid in rehabilitation program.  Status at IE: Pt was given and pt demonstrated and reported and understanding.   10/22/21: MET, patient reports compliance                  Patient will increase lumbar flexion ROM to greater than 6 inches from fibular head to aid in completion of ADLs  Status at IE: lacking lumbar flexion , fingertips approx 4 inches from fibular head (approx 10 inches from the ground).   Current:  2.5 inches proximal to fibular head.  (11/12/21)  Current:  10.5 inches fingertips to floor.  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 Status at IE:Pt reports the ability to walk for no more than 0-2 minutes without increased pain, reports 30 minute walk/stand increases to approx 7/10 pain.   11/5/2021 worst pain 6-7/10 by end of day with with lifting son  11/12 Worst pain 5/10 anytime she picks up her son (11/12/21)  12/13/2021 pain about the same as previos report, 5-6/10 with lifting and walking however has been able to walk and exercise more \"without getting flared up\";  PROGRESSING TOWARDS GOAL  1/7/22: Seamus Garcia is able to stand/walk for 30 minutes with c/o 4-5/10 pain              Patient will report 50% decrease in pain complaints demonstrated by improved bed mobility (rolling over in bed) no more than 2/10 to improve quality of sleep. Status at eval: Patient reports 4/10 pain rolling over in bed every night.   Current:  Patient reports pain as 6/10 with rolling over bed and getting out of bed (11/12/21)  12/1/2021 Pt reports decreased LBP for several weeks but reports sharp 10/10 pain last night rolling over, spouse had to help her roll, then pain subsided  12/13/2021 progressing slowly towards goal, pt has had minimal 2/10 LBP for the last few weeks; primarily complaint pubic pain with lifting, walking and bed mobility, highest pain 6/10; PROGRESSING  12/27/2021 5/10 worst pain; no LBP and \"tolerable pubic pain\" generally 2-3/10  1/7/22: continues to report worst pain at 7-8/10 pain  1/14/22  Patient reports decrease in pain to 4/10 1/14/2022 Progressing        Patient will be able to lift 30# from floor with proper mechanics and no report of pain  Status at eval: Patient unable to perform 25# carseat lifts and in/out of car without report of pain 5/10. Current:  Pt tolerated lifting 10# from her knee height to her waist with no increase in pain.  (10/29/21)  Current: Pt tolerated lifting 10# from 10 inches (about height of her son when in standing) but 15# increase her pain in lower abdomen 3/10.  12/13/2021 pt tolerating 25# lift with no increase in LBP however lifting son causing 5/10 pain; PROGRESSING TOWARDS GOAL  1/7/22: patient able to lift 23# lb son with c/o \"uncomfortable\" PROGRESSING  1/21/22 Patient is able to lift 10#, 15# and 20# without increased pain Progressing        Patient will demonstrate independence with management tools and exercise program that are beneficial for current condition in order to feel comfortable with Pelvic floor PT D/C and not fear exacerbation of current condition or symptoms returning.    Status at eval : patient fearful of return to exercise and unaware of what activities to avoid to avoid exacerbation of current condition  12/13/2021 pt compliant with basic HEP and swiss ball progressions, reports she feels PT once/week is \"keeping me functional and in the last two weeks, it hasn't gotten any worse which I think is a really good thing\"; progressing towards goal  1/7/22: PROGRESSING, patient is compliant with HEP and reports that therapy is helping her to maintain her current QOL          PLAN  []  Upgrade activities as tolerated     [x]  Continue plan of care  [x]  Update interventions per flow sheet       []  Discharge due to:_  []  Other:_      Liz Simmons, PT 1/21/2022  10:58 AM    Future Appointments   Date Time Provider Viraj Ramirez   1/21/2022 11:45 AM Dg Marie PT [facility]   1/28/2022  1:15 PM Dg Marie PT [facility]   2/4/2022  8:00 AM Dg Marie PT [facility]   2/11/2022  8:00 AM Dg Marie [facility]   2/16/2022  8:00 AM Alonso Andrews [facility]   2/25/2022  8:00 AM Dg Marie PT [facility]

## 2022-01-27 ENCOUNTER — TELEPHONE (OUTPATIENT)
Dept: PHYSICAL THERAPY | Age: 27
End: 2022-01-27

## 2022-01-28 ENCOUNTER — TELEPHONE (OUTPATIENT)
Dept: PHYSICAL THERAPY | Age: 27
End: 2022-01-28

## 2022-01-28 ENCOUNTER — APPOINTMENT (OUTPATIENT)
Dept: PHYSICAL THERAPY | Age: 27
End: 2022-01-28
Payer: COMMERCIAL

## 2022-02-04 ENCOUNTER — HOSPITAL ENCOUNTER (OUTPATIENT)
Dept: PHYSICAL THERAPY | Age: 27
Discharge: HOME | End: 2022-02-04
Payer: COMMERCIAL

## 2022-02-04 PROCEDURE — 97112 NEUROMUSCULAR REEDUCATION: CPT

## 2022-02-04 NOTE — PROGRESS NOTES
[facility]  [address] Karlo Valdovinos, [address]  Ph [phone]  Fx [phone]    Physical Therapy Progress Note  Patient name: Nona Gilman Start of Care: 10/18/2021   Referral Aliya Denis MD : 1995   Medical/Treatment Diagnosis: Pelvic floor dysfunction [M62.89] Onset Date:at about 11 weeks ago   Prior Hospitalization: see medical history Provider#: [id number]   Medications: Verified on Patient Summary List     Comorbidities: PMHx/Surgical Hx: cardiac ablation (PVCs), pregnancy/vaginal delivery 2020; current pregnancy   Prior Level of Function: active lifestyle, not functionally limited, no pain with intercourse           Visits from Start of Care: 14    Missed Visits: 0    Updated Goals/Measure of Progress: To be achieved in 6 weeks:  Short Term Goals:   Patient will report compliance with PSD HEP 3x/day to aid in rehabilitation program.  Status at IE: Pt was given and pt demonstrated and reported and understanding.   10/22/21: MET, patient reports compliance                  Patient will increase lumbar flexion ROM to greater than 6 inches from fibular head to aid in completion of ADLs  Status at IE: lacking lumbar flexion , fingertips approx 4 inches from fibular head (approx 10 inches from the ground).   Current:  2.5 inches proximal to fibular head.  (21)  Current:  10.5 inches fingertips to floor.  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   Status at IE:Pt reports the ability to walk for no more than 0-2 minutes without increased pain, reports 30 minute walk/stand increases to approx 7/10 pain.   2021 worst pain 6-7/10 by end of day with with lifting son   Worst pain 5/10 anytime she picks up her son (11/12/21)  12/13/2021 pain about the same as previos report, 5-6/10 with lifting and walking however has been able to walk and exercise more \"without getting flared up\"; PROGRESSING TOWARDS GOAL  1/7/22: Jonathan Tan is able to stand/walk for 30 minutes with c/o 4-5/10 pain              Patient will report 50% decrease in pain complaints demonstrated by improved bed mobility (rolling over in bed) no more than 2/10 to improve quality of sleep. Status at [facility]: Patient reports 4/10 pain rolling over in bed every night.   Current:  Patient reports pain as 6/10 with rolling over bed and getting out of bed (11/12/21)  12/1/2021 Pt reports decreased LBP for several weeks but reports sharp 10/10 pain last night rolling over, spouse had to help her roll, then pain subsided  12/13/2021 progressing slowly towards goal, pt has had minimal 2/10 LBP for the last few weeks; primarily complaint pubic pain with lifting, walking and bed mobility, highest pain 6/10; PROGRESSING  12/27/2021 5/10 worst pain; no LBP and \"tolerable pubic pain\" generally 2-3/10  1/7/22: continues to report worst pain at 7-8/10 pain  1/14/22  Patient reports decrease in pain to 4/10 1/14/2022 Progressing  2/4/2022 Patient reports pain as high as 5/10 in pubic symphysis.       Patient will be able to lift 30# from floor with proper mechanics and no report of pain  Status at [facility]: Patient unable to perform 25# carseat lifts and in/out of car without report of pain 5/10.   Current:  Pt tolerated lifting 10# from her knee height to her waist with no increase in pain.  (10/29/21)  Current: Pt tolerated lifting 10# from 10 inches (about height of her son when in standing) but 15# increase her pain in lower abdomen 3/10.  12/13/2021 pt tolerating 25# lift with no increase in LBP however lifting son causing 5/10 pain; PROGRESSING TOWARDS GOAL  1/7/22: patient able to lift 23# lb son with c/o \"uncomfortable\" PROGRESSING  1/21/22 Patient is able to lift 10#, 15# and 20# without increased pain Progressing  2/4/2022  Patient is able to lift 10#, 15# and 20# without increased pain Progressing          Patient will demonstrate independence with management tools and exercise program that are beneficial for current condition in order to feel comfortable with Pelvic floor PT D/C and not fear exacerbation of current condition or symptoms returning. Status at [facility] : patient fearful of return to exercise and unaware of what activities to avoid to avoid exacerbation of current condition  12/13/2021 pt compliant with basic HEP and swiss ball progressions, reports she feels PT once/week is \"keeping me functional and in the last two weeks, it hasn't gotten any worse which I think is a really good thing\"; progressing towards goal  1/7/22: PROGRESSING, patient is compliant with HEP and reports that therapy is helping her to maintain her current QOL   2/4/2022 patient is compliant with HEP and reports that therapy is helping her to maintain her current QOL            Summary of Care/ Key Functional Changes: Patient is a 28 y/o female who has approximately 5 weeks left in her 2nd pregnancy. Patient continues to have c/o pubic symphysis pain. She is compliant with her HEP and reports that continuing physical therapy helps maintain her QOL.         ASSESSMENT/RECOMMENDATIONS:  [x]Continue therapy per initial plan/protocol at a frequency of  1 x per week for 6 weeks  []Continue therapy with the following recommended changes:_____________________ _____________________________ ________________________________________  []Discontinue therapy progressing towards or have reached established goals  []Discontinue therapy due to lack of appreciable progress towards goals  []Discontinue therapy due to lack of attendance or compliance  []Await Physician's recommendations/decisions regarding therapy  []Other:________________________________________________________________    Thank you for this referral.   Zoya Cain, PT 2/4/2022 2:08 PM

## 2022-02-04 NOTE — PROGRESS NOTES
PT DAILY TREATMENT NOTE    Patient Name: Jose Galicia  KLUX:[birth year]  : 1995  [x]  Patient  Verified  Payor: Joni Garnett / Plan: Jonathon Langston / Product Type: HMO /    In time:804 Out time:848  Total Treatment Time (min): 44  Total Timed Codes (min): 44  1:1 Treatment Time ([address] only): 44   Visit #: 14 of 20    Treatment Area: Other specified disorders of muscle [M62.89]    SUBJECTIVE  Pain Level (0-10 scale): 5/10  Any medication changes, allergies to medications, adverse drug reactions, diagnosis change, or new procedure performed?: [x] No    [] Yes (see summary sheet for update)  Subjective functional status/changes:   [] No changes reported  Patient reports: mixed compliance with current HEP this week due to her son's illness and work. OBJECTIVE      44 min Manual Therapy:  MFR, Trigger Point release; gluteals, abduction   Rationale: decrease pain, increase ROM, increase tissue extensibility and decrease trigger points in order to Improve ability to perform ADLs. With   [] TE   [] TA   [] neuro   [] other: Patient Education: [x] Review HEP    [] Progressed/Changed HEP based on:   [] positioning   [] body mechanics   [] transfers   [] heat/ice application    [] other:           Pain Level (0-10 scale) post treatment: 5/10    ASSESSMENT/Changes in Function: Patient tolerated today's session well with no c/o pain. Patient demonstrated understanding of today's instruction, education, and recommendations. Patient is progressing appropriately towards goals. Patient has ~5 weeks left in her pregnancy. She continues to have pain in pubic symphysis and gluteal region. Patient reports better QOL with therapy although the pain levels have not reduced.      Patient will continue to benefit from skilled PT services to modify and progress therapeutic interventions, address functional mobility deficits, analyze and address soft tissue restrictions and analyze and cue movement patterns to attain remaining goals. [x]  See Plan of Care  []  See progress note/recertification  []  See Discharge Summary         Progress towards goals / Updated goals:  Short Term Goals: To be accomplished in 2 weeks:  Patient will report compliance with PSD HEP 3x/day to aid in rehabilitation program.  Status at IE: Pt was given and pt demonstrated and reported and understanding.   10/22/21: MET, patient reports compliance                  Patient will increase lumbar flexion ROM to greater than 6 inches from fibular head to aid in completion of ADLs  Status at IE: lacking lumbar flexion , fingertips approx 4 inches from fibular head (approx 10 inches from the ground).   Current:  2.5 inches proximal to fibular head.  (11/12/21)  Current:  10.5 inches fingertips to floor.  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 Status at IE:Pt reports the ability to walk for no more than 0-2 minutes without increased pain, reports 30 minute walk/stand increases to approx 7/10 pain.   11/5/2021 worst pain 6-7/10 by end of day with with lifting son  11/12 Worst pain 5/10 anytime she picks up her son (11/12/21)  12/13/2021 pain about the same as previos report, 5-6/10 with lifting and walking however has been able to walk and exercise more \"without getting flared up\"; PROGRESSING TOWARDS GOAL  1/7/22: Nile Trinh is able to stand/walk for 30 minutes with c/o 4-5/10 pain              Patient will report 50% decrease in pain complaints demonstrated by improved bed mobility (rolling over in bed) no more than 2/10 to improve quality of sleep.   Status at eval: Patient reports 4/10 pain rolling over in bed every night.   Current:  Patient reports pain as 6/10 with rolling over bed and getting out of bed (11/12/21)  12/1/2021 Pt reports decreased LBP for several weeks but reports sharp 10/10 pain last night rolling over, spouse had to help her roll, then pain subsided  12/13/2021 progressing slowly towards goal, pt has had minimal 2/10 LBP for the last few weeks; primarily complaint pubic pain with lifting, walking and bed mobility, highest pain 6/10; PROGRESSING  12/27/2021 5/10 worst pain; no LBP and \"tolerable pubic pain\" generally 2-3/10  1/7/22: continues to report worst pain at 7-8/10 pain  1/14/22  Patient reports decrease in pain to 4/10 1/14/2022 Progressing  2/4/2022 Patient reports pain as high as 5/10 in pubic symphysis.       Patient will be able to lift 30# from floor with proper mechanics and no report of pain  Status at eval: Patient unable to perform 25# carseat lifts and in/out of car without report of pain 5/10. Current:  Pt tolerated lifting 10# from her knee height to her waist with no increase in pain.  (10/29/21)  Current: Pt tolerated lifting 10# from 10 inches (about height of her son when in standing) but 15# increase her pain in lower abdomen 3/10.  12/13/2021 pt tolerating 25# lift with no increase in LBP however lifting son causing 5/10 pain; PROGRESSING TOWARDS GOAL  1/7/22: patient able to lift 23# lb son with c/o \"uncomfortable\" PROGRESSING  1/21/22 Patient is able to lift 10#, 15# and 20# without increased pain Progressing        Patient will demonstrate independence with management tools and exercise program that are beneficial for current condition in order to feel comfortable with Pelvic floor PT D/C and not fear exacerbation of current condition or symptoms returning.    Status at eval : patient fearful of return to exercise and unaware of what activities to avoid to avoid exacerbation of current condition  12/13/2021 pt compliant with basic HEP and swiss ball progressions, reports she feels PT once/week is \"keeping me functional and in the last two weeks, it hasn't gotten any worse which I think is a really good thing\"; progressing towards goal  1/7/22: PROGRESSING, patient is compliant with HEP and reports that therapy is helping her to maintain her current QOL       PLAN  []  Upgrade activities as tolerated     [x]  Continue plan of care  [x]  Update interventions per flow sheet       []  Discharge due to:_  []  Other:_      Dora Ascencio, PT 2/4/2022  8:02 AM    Future Appointments   Date Time Provider Viraj Ramirez   2/11/2022  8:00 AM Rhianna Medellin PT [facility]   2/16/2022  8:00 AM Floyd Gomes [facility]   2/25/2022  8:00 AM Rhianna Medellin PT [facility]

## 2022-02-08 ENCOUNTER — HOSPITAL ENCOUNTER (EMERGENCY)
Age: 27
Discharge: ANOTHER HOSPITAL | End: 2022-02-08
Attending: EMERGENCY MEDICINE
Payer: COMMERCIAL

## 2022-02-08 VITALS
RESPIRATION RATE: 18 BRPM | DIASTOLIC BLOOD PRESSURE: 85 MMHG | TEMPERATURE: 96.9 F | SYSTOLIC BLOOD PRESSURE: 113 MMHG | OXYGEN SATURATION: 100 % | HEART RATE: 84 BPM

## 2022-02-08 DIAGNOSIS — O99.119 THROMBOCYTOPENIA AFFECTING PREGNANCY (HCC): Primary | ICD-10-CM

## 2022-02-08 DIAGNOSIS — D69.6 THROMBOCYTOPENIA AFFECTING PREGNANCY (HCC): Primary | ICD-10-CM

## 2022-02-08 PROBLEM — Z3A.35 35 WEEKS GESTATION OF PREGNANCY: Status: ACTIVE | Noted: 2022-02-08

## 2022-02-08 LAB
ABO + RH BLD: NORMAL
ALBUMIN SERPL-MCNC: 2.7 G/DL (ref 3.4–5)
ALBUMIN/GLOB SERPL: 0.8 {RATIO} (ref 0.8–1.7)
ALP SERPL-CCNC: 119 U/L (ref 45–117)
ALT SERPL-CCNC: 14 U/L (ref 13–56)
ANION GAP BLD CALC-SCNC: 9 MMOL/L (ref 10–20)
ANION GAP SERPL CALC-SCNC: 6 MMOL/L (ref 3–18)
APTT PPP: 27.7 SEC (ref 23–36.4)
AST SERPL-CCNC: 15 U/L (ref 10–38)
BASOPHILS # BLD: 0 K/UL (ref 0–0.1)
BASOPHILS NFR BLD: 0 % (ref 0–2)
BILIRUB SERPL-MCNC: 0.3 MG/DL (ref 0.2–1)
BLOOD GROUP ANTIBODIES SERPL: NORMAL
BUN SERPL-MCNC: 7 MG/DL (ref 7–18)
BUN/CREAT SERPL: 13 (ref 12–20)
CA-I BLD-MCNC: 1.01 MMOL/L (ref 1.12–1.32)
CALCIUM SERPL-MCNC: 8.5 MG/DL (ref 8.5–10.1)
CHLORIDE BLD-SCNC: 107 MMOL/L (ref 98–107)
CHLORIDE SERPL-SCNC: 106 MMOL/L (ref 100–111)
CO2 BLD-SCNC: 22.9 MMOL/L (ref 21–32)
CO2 SERPL-SCNC: 26 MMOL/L (ref 21–32)
CREAT BLD-MCNC: 0.45 MG/DL (ref 0.51–1.19)
CREAT BLD-MCNC: 0.5 MG/DL (ref 0.6–1.3)
CREAT SERPL-MCNC: 0.55 MG/DL (ref 0.6–1.3)
DIFFERENTIAL METHOD BLD: ABNORMAL
EOSINOPHIL # BLD: 0 K/UL (ref 0–0.4)
EOSINOPHIL NFR BLD: 0 % (ref 0–5)
ERYTHROCYTE [DISTWIDTH] IN BLOOD BY AUTOMATED COUNT: 12.4 % (ref 11.6–14.5)
GLOBULIN SER CALC-MCNC: 3.6 G/DL (ref 2–4)
GLUCOSE BLD-MCNC: 68 MG/DL (ref 65–100)
GLUCOSE SERPL-MCNC: 68 MG/DL (ref 74–99)
HCT VFR BLD AUTO: 33.1 % (ref 35–45)
HGB BLD-MCNC: 11 G/DL (ref 12–16)
IMM GRANULOCYTES # BLD AUTO: 0.1 K/UL (ref 0–0.04)
IMM GRANULOCYTES NFR BLD AUTO: 1 % (ref 0–0.5)
INR PPP: 1 (ref 0.8–1.2)
LACTATE BLD-SCNC: 0.7 MMOL/L (ref 0.4–2)
LYMPHOCYTES # BLD: 2.1 K/UL (ref 0.9–3.6)
LYMPHOCYTES NFR BLD: 18 % (ref 21–52)
MCH RBC QN AUTO: 30.6 PG (ref 24–34)
MCHC RBC AUTO-ENTMCNC: 33.2 G/DL (ref 31–37)
MCV RBC AUTO: 92.2 FL (ref 78–100)
MONOCYTES # BLD: 0.8 K/UL (ref 0.05–1.2)
MONOCYTES NFR BLD: 7 % (ref 3–10)
NEUTS SEG # BLD: 8.9 K/UL (ref 1.8–8)
NEUTS SEG NFR BLD: 74 % (ref 40–73)
NRBC # BLD: 0 K/UL (ref 0–0.01)
NRBC BLD-RTO: 0 PER 100 WBC
PLATELET # BLD AUTO: <2 K/UL (ref 135–420)
PLATELET COMMENTS,PCOM: ABNORMAL
POTASSIUM BLD-SCNC: 3.4 MMOL/L (ref 3.5–5.1)
POTASSIUM SERPL-SCNC: 3.6 MMOL/L (ref 3.5–5.5)
PROT SERPL-MCNC: 6.3 G/DL (ref 6.4–8.2)
PROTHROMBIN TIME: 12.3 SEC (ref 11.5–15.2)
RBC # BLD AUTO: 3.59 M/UL (ref 4.2–5.3)
RBC MORPH BLD: ABNORMAL
SERVICE CMNT-IMP: ABNORMAL
SODIUM BLD-SCNC: 138 MMOL/L (ref 136–145)
SODIUM SERPL-SCNC: 138 MMOL/L (ref 136–145)
SPECIMEN EXP DATE BLD: NORMAL
SPECIMEN SITE: ABNORMAL
WBC # BLD AUTO: 11.9 K/UL (ref 4.6–13.2)

## 2022-02-08 PROCEDURE — 86900 BLOOD TYPING SEROLOGIC ABO: CPT

## 2022-02-08 PROCEDURE — 85730 THROMBOPLASTIN TIME PARTIAL: CPT

## 2022-02-08 PROCEDURE — 85025 COMPLETE CBC W/AUTO DIFF WBC: CPT

## 2022-02-08 PROCEDURE — 80047 BASIC METABLC PNL IONIZED CA: CPT

## 2022-02-08 PROCEDURE — 85610 PROTHROMBIN TIME: CPT

## 2022-02-08 PROCEDURE — 80053 COMPREHEN METABOLIC PANEL: CPT

## 2022-02-08 PROCEDURE — 82565 ASSAY OF CREATININE: CPT

## 2022-02-08 PROCEDURE — 99283 EMERGENCY DEPT VISIT LOW MDM: CPT

## 2022-02-08 RX ORDER — SODIUM CHLORIDE 9 MG/ML
250 INJECTION, SOLUTION INTRAVENOUS AS NEEDED
Status: DISCONTINUED | OUTPATIENT
Start: 2022-02-08 | End: 2022-02-08 | Stop reason: HOSPADM

## 2022-02-08 NOTE — ED PROVIDER NOTES
EMERGENCY DEPARTMENT HISTORY AND PHYSICAL EXAM    Date: 2/8/2022  Patient Name: Orestes Swanson    History of Presenting Illness     Chief Complaint   Patient presents with    Rash    Gum Problem         History Provided By: Patient    4:13 PM  Orestes Swanson is a 27 y.o. female with PMHX of G2, P1, currently 35 weeks pregnant, followed by Dr. Nick Roach, fully vaccinated for COVID-19 who presents to the emergency department C/O bleeding gums and rash. Patient reports she has had a normal pregnancy and has not been ill recently. She was brushing her teeth when she started having some bleeding from her gums and that has persisted throughout the day. She states she then noticed that she started to get a rash on her arms and legs that is a fine red raised rash. She denies bleeding from anywhere else, fever, headache, chest pain, shortness of breath, abdominal pain, bowel or urinary complaints, vaginal bleeding. Reports normal fetal movement. PCP: Jorge Cowan NP    Current Facility-Administered Medications   Medication Dose Route Frequency Provider Last Rate Last Admin    0.9% sodium chloride infusion 250 mL  250 mL IntraVENous PRN Carola Borjas MD         Current Outpatient Medications   Medication Sig Dispense Refill    pyridoxine, vitamin B6, (VITAMIN B-6) 100 mg tablet Take 100 mg by mouth daily.  ondansetron hcl (Zofran) 4 mg tablet take 1 tablet by oral route 2 times every day prn nausea and vomiting (Patient not taking: Reported on 7/30/2021)      acetaminophen (Tylenol Extra Strength) 500 mg tablet Take 1,000 mg by mouth every six (6) hours as needed for Pain. Indications: backache      PNV No12-Iron-FA-DSS-OM-3 29 mg iron-1 mg -50 mg CPKD Take  by mouth.          Past History       Past Medical History:  Past Medical History:   Diagnosis Date    Dermoid cyst     left ovary    Heart abnormality 2019    Heart ablation    History of abnormal cervical Pap smear 2021    Migraines no aura    Ovarian cyst 2020    PVC's (premature ventricular contractions)        Past Surgical History:  Past Surgical History:   Procedure Laterality Date    HX OTHER SURGICAL      cyst removal L eye 1996    HX SVT ABLATION N/A 2019    Cardiac Ablation due to PVC's. Family History:  Family History   Problem Relation Age of Onset   Verl Raw Other Son         Hydronephrosis    Thyroid Disease Paternal Grandmother     Thyroid Disease Paternal Aunt        Social History:  Social History     Tobacco Use    Smoking status: Never Smoker    Smokeless tobacco: Never Used   Vaping Use    Vaping Use: Never used   Substance Use Topics    Alcohol use: Not Currently     Comment: socially    Drug use: Never       Allergies: Allergies   Allergen Reactions    Amitriptyline Swelling, Rash and Itching         Review of Systems   Review of Systems   Constitutional: Negative for fever. Respiratory: Negative for shortness of breath. Cardiovascular: Negative for chest pain. Gastrointestinal: Negative for abdominal pain. Skin: Positive for rash. Hematological: Bruises/bleeds easily. All other systems reviewed and are negative.         Physical Exam     Vitals:    02/08/22 1545   BP: 113/85   Pulse: 84   Resp: 18   Temp: 96.9 °F (36.1 °C)   SpO2: 100%     Physical Exam    Nursing notes and vital signs reviewed    Constitutional: Non toxic appearing, moderate distress  Head: Normocephalic, Atraumatic  Eyes: EOMI  ENT: Slight oozing of dark blood from lower gums, clear posterior pharynx without erythema, sick, edema  Neck: Supple  Cardiovascular: Regular rate and rhythm, no murmurs, rubs, or gallops  Chest: Normal work of breathing and chest excursion bilaterally  Lungs: Clear to ausculation bilaterally  Abdomen: Soft, non tender, gravid, normoactive bowel sounds  Back: No evidence of trauma or deformity  Extremities: No evidence of trauma or deformity, no LE edema  Skin: Warm and dry, normal cap refill, raised, dark red to purple petechiae primarily over lower legs but also scattered over upper extremities that are nontender, nonpruritic, nonblanching  Neuro: Alert and appropriate  Psychiatric: Normal mood and affect      Diagnostic Study Results     Labs -     Recent Results (from the past 12 hour(s))   CBC WITH AUTOMATED DIFF    Collection Time: 02/08/22  4:14 PM   Result Value Ref Range    WBC 11.9 4.6 - 13.2 K/uL    RBC 3.59 (L) 4.20 - 5.30 M/uL    HGB 11.0 (L) 12.0 - 16.0 g/dL    HCT 33.1 (L) 35.0 - 45.0 %    MCV 92.2 78.0 - 100.0 FL    MCH 30.6 24.0 - 34.0 PG    MCHC 33.2 31.0 - 37.0 g/dL    RDW 12.4 11.6 - 14.5 %    PLATELET <2 (LL) 070 - 420 K/uL    NRBC 0.0 0  WBC    ABSOLUTE NRBC 0.00 0.00 - 0.01 K/uL    NEUTROPHILS 74 (H) 40 - 73 %    LYMPHOCYTES 18 (L) 21 - 52 %    MONOCYTES 7 3 - 10 %    EOSINOPHILS 0 0 - 5 %    BASOPHILS 0 0 - 2 %    IMMATURE GRANULOCYTES 1 (H) 0.0 - 0.5 %    ABS. NEUTROPHILS 8.9 (H) 1.8 - 8.0 K/UL    ABS. LYMPHOCYTES 2.1 0.9 - 3.6 K/UL    ABS. MONOCYTES 0.8 0.05 - 1.2 K/UL    ABS. EOSINOPHILS 0.0 0.0 - 0.4 K/UL    ABS. BASOPHILS 0.0 0.0 - 0.1 K/UL    ABS. IMM.  GRANS. 0.1 (H) 0.00 - 0.04 K/UL    DF AUTOMATED      PLATELET COMMENTS DECREASED PLATELETS      RBC COMMENTS NORMOCYTIC, NORMOCHROMIC     PROTHROMBIN TIME + INR    Collection Time: 02/08/22  4:14 PM   Result Value Ref Range    Prothrombin time 12.3 11.5 - 15.2 sec    INR 1.0 0.8 - 1.2     METABOLIC PANEL, COMPREHENSIVE    Collection Time: 02/08/22  4:14 PM   Result Value Ref Range    Sodium 138 136 - 145 mmol/L    Potassium 3.6 3.5 - 5.5 mmol/L    Chloride 106 100 - 111 mmol/L    CO2 26 21 - 32 mmol/L    Anion gap 6 3.0 - 18 mmol/L    Glucose 68 (L) 74 - 99 mg/dL    BUN 7 7.0 - 18 MG/DL    Creatinine 0.55 (L) 0.6 - 1.3 MG/DL    BUN/Creatinine ratio 13 12 - 20      GFR est AA >60 >60 ml/min/1.73m2    GFR est non-AA >60 >60 ml/min/1.73m2    Calcium 8.5 8.5 - 10.1 MG/DL    Bilirubin, total 0.3 0.2 - 1.0 MG/DL    ALT (SGPT) 14 13 - 56 U/L    AST (SGOT) 15 10 - 38 U/L    Alk. phosphatase 119 (H) 45 - 117 U/L    Protein, total 6.3 (L) 6.4 - 8.2 g/dL    Albumin 2.7 (L) 3.4 - 5.0 g/dL    Globulin 3.6 2.0 - 4.0 g/dL    A-G Ratio 0.8 0.8 - 1.7     PTT    Collection Time: 02/08/22  4:14 PM   Result Value Ref Range    aPTT 27.7 23.0 - 36.4 SEC   CREATININE, POC    Collection Time: 02/08/22  5:05 PM   Result Value Ref Range    Creatinine (POC) 0.45 (L) 0.51 - 1.19 mg/dL    GFRAA, POC >60 >60 ml/min/1.73m2    GFRNA, POC >60 >60 ml/min/1.73m2   CHEM8,LACTIC ACID,POC    Collection Time: 02/08/22  5:21 PM   Result Value Ref Range    Calcium, ionized (POC) 1.01 (L) 1.12 - 1.32 mmol/L    Sodium (POC) 138 136 - 145 mmol/L    Potassium (POC) 3.4 (L) 3.5 - 5.1 mmol/L    Chloride (POC) 107 98 - 107 mmol/L    CO2 (POC) 22.9 21 - 32 mmol/L    Glucose (POC) 68 65 - 100 mg/dL    Creatinine (POC) 0.50 (L) 0.6 - 1.3 mg/dL    GFRAA, POC >60 >60 ml/min/1.73m2    GFRNA, POC >60 >60 ml/min/1.73m2    Lactic Acid (POC) 0.70 0.40 - 2.00 mmol/L    Sample source VENOUS BLOOD      Performed by JESSICA MADDOX        Anion gap (POC) 9 (L) 10 - 20 mmol/L       Radiologic Studies -   No orders to display     CT Results  (Last 48 hours)    None        CXR Results  (Last 48 hours)    None          Medications given in the ED-  Medications   0.9% sodium chloride infusion 250 mL (has no administration in time range)         Medical Decision Making   I am the first provider for this patient. I reviewed the vital signs, available nursing notes, past medical history, past surgical history, family history and social history. Vital Signs-Reviewed the patient's vital signs. Pulse Oximetry Analysis - 100% on room air, not hypoxic     Records Reviewed: Nursing Notes and Old Medical Records    Provider Notes (Medical Decision Making): Denver Brown is a 27 y.o. female presenting for spontaneous bleeding from her gums and petechiae.   Patient hemodynamically stable without other complaints. Normal fetal heart tones on bedside ultrasound and benign abdomen. Labs reveal a platelets of less than 2. Patient slightly anemic but otherwise labs are benign including liver and kidney function. We do not have any platelets in stock at our blood bank here. OB/GYN has come down to emergently evaluate patient and we have arranged for emergent transfer to facility with [facility] capabilities and or extensive blood products on hand. Patient and her  understand and agree with this plan. Procedures:  Procedures    ED Course:   CONSULT NOTE:   5:24 PM  Dr. Corrine Montoya spoke with Dr. Tamera Negrete   Specialty: Annette Flores  Discussed pt's hx, disposition, and available diagnostic and imaging results over the telephone. Reviewed care plans. Recommends transfer to [facility].  Will come to the ED to evaluate patient as soon as possible. I have discussed with the patient the rationale for blood component transfusion; its benefits in treating or preventing fatigue, organ damage, or death; and its risk which includes mild transfusion reactions, rare risk of blood borne infection, or more serious but rare reactions. I have discussed the alternatives to transfusion, including the risk and consequences of not receiving transfusion. The patient had an opportunity to ask questions and had agreed to proceed with transfusion of blood components. 5:30PM  Dr. Tamera Negrete is at bedside evaluating patient. CONSULT NOTE:   5:39 PM  Dr. Corrine Montoya spoke with Dr. Tasia Ramos  Specialty: M[facility] at [facility]  Discussed pt's hx, disposition, and available diagnostic and imaging results over the telephone. Reviewed care plans. Accepts for emergent transfer to L&D. Wen Odom Diagnosis and Disposition     Critical Care: 5:44 PM  I have spent 40 minutes of critical care time involved in lab review, consultations with specialist, family decision-making, and documentation.   During this entire length of time I was immediately available to the patient. Critical Care: The reason for providing this level of medical care for this critically ill patient was due a critical illness that impaired one or more vital organ systems such that there was a high probability of imminent or life threatening deterioration in the patients condition. This care involved high complexity decision making to assess, manipulate, and support vital system functions, to treat this degreee vital organ system failure and to prevent further life threatening deterioration of the patients condition. CLINICAL IMPRESSION:    1. Thrombocytopenia affecting pregnancy ([address]        PLAN:  1. Transfer emergently to [facility] L&D  _______________________________      Please note that this dictation was completed with Rain, the computer voice recognition software. Quite often unanticipated grammatical, syntax, homophones, and other interpretive errors are inadvertently transcribed by the computer software. Please disregard these errors. Please excuse any errors that have escaped final proofreading.

## 2022-02-08 NOTE — ED NOTES
Pt stable at this time. Report given to CASTILLO brady at [facility] here for patient given report they verbally understood and has taken patient to [place] general L and D unit.

## 2022-02-08 NOTE — ED TRIAGE NOTES
Pt arrives to ed reporting a rash that she noticed all over her body today. Pt also reports that her gums have been bleeding since this morning. Pt was seen at urgent care and was instructed to come here for further eval. Pt is 35 weeks pregnant denies complaints with pregnancy.

## 2022-02-08 NOTE — CONSULTS
History & Physical    Name: Brionna Guerin MRN: [id number]  SSN: [id number]    YOB: 1995  Age: 27 y.o. Sex: female        Subjective:     Estimated Date of Delivery: 3/10/22  OB History    Para Term  AB Living   2 1 1 0 0 1   SAB IAB Ectopic Molar Multiple Live Births   0 0 0 0 0 1      # Outcome Date GA Lbr Gibran/2nd Weight Sex Delivery Anes PTL Lv   2 Current            1 Term 07/15/20 38w4d 12:35 / 01:29 3.345 kg Franz Duverney       Ms. Gilman presents to the ED with complaint of bleeding gums and rash. She is 35w5d. Pregnancy complicated by Hx PVCs s/p ablation (f/b cards), L sided dermoid cyst, hx abnormal pap, migraines. She initially called the office with above complaints. She was asked to go to urgent care, who then sent her to the ED. Plts were found to be <2. Normal BP, normal preE labs, no fever, no mentation changes. She denies bleeding, leaking, cramping, rare contractions. Reports good fetal movement. Plt at beginning of pregnancy (2021) - 231  Plt in 2021 - 159  Plt today in ED <2    Last platelets [facility] system include plt in 2020 when she delivered her last child. Plt were 146 at that time. Past Medical History:   Diagnosis Date    Dermoid cyst     left ovary    Heart abnormality 2019    Heart ablation    History of abnormal cervical Pap smear     Migraines     no aura    Ovarian cyst     PVC's (premature ventricular contractions)     Thrombocytopenia complicating pregnancy ([address] 2022     Past Surgical History:   Procedure Laterality Date    HX OTHER SURGICAL      cyst removal L eye     HX SVT ABLATION N/A 2019    Cardiac Ablation due to PVC's.      Social History     Occupational History    Not on file   Tobacco Use    Smoking status: Never Smoker    Smokeless tobacco: Never Used   Vaping Use    Vaping Use: Never used   Substance and Sexual Activity    Alcohol use: Not Currently     Comment: socially    Drug use: Never    Sexual activity: Yes     Partners: Male     Birth control/protection: None     Family History   Problem Relation Age of Onset    Other Son         Hydronephrosis    Thyroid Disease Paternal Grandmother     Thyroid Disease Paternal Aunt        Allergies   Allergen Reactions    Amitriptyline Swelling, Rash and Itching     Prior to Admission medications    Medication Sig Start Date End Date Taking? Authorizing Provider   pyridoxine, vitamin B6, (VITAMIN B-6) 100 mg tablet Take 100 mg by mouth daily. Provider, Historical   ondansetron hcl (Zofran) 4 mg tablet take 1 tablet by oral route 2 times every day prn nausea and vomiting  Patient not taking: Reported on 7/30/2021 5/21/20   Provider, Historical   acetaminophen (Tylenol Extra Strength) 500 mg tablet Take 1,000 mg by mouth every six (6) hours as needed for Pain. Indications: backache    Provider, Historical   PNV No12-Iron-FA-DSS-OM-3 29 mg iron-1 mg -50 mg CPKD Take  by mouth. Provider, Historical        Review of Systems: Pertinent items are noted in HPI. Objective:     Vitals:  Vitals:    02/08/22 1545   BP: 113/85   Pulse: 84   Resp: 18   Temp: 96.9 °F (36.1 °C)   SpO2: 100%        Physical Exam:  Patient without distress.  Normal mentation  Abdomen: soft, nontender, without guarding, without rebound, petechiae to umbilicus  Lower Extremities: petechiae bilaterally  Membranes:  Intact  Fetal Heart Rate: 125 per ED note    Prenatal Labs:   Lab Results   Component Value Date/Time    ABO/Rh(D) O POSITIVE 02/08/2022 04:14 PM    Rubella, External Immune 12/16/2019 12:00 AM    GrBStrep, External Negative 06/29/2020 12:00 AM    HBsAg, External Negative 12/16/2019 12:00 AM    HIV, External Negative 12/16/2019 12:00 AM    RPR, External Non-reactive 12/16/2019 12:00 AM    Gonorrhea, External Negative 12/16/2019 12:00 AM    Chlamydia, External Negative 12/16/2019 12:00 AM    ABO,Rh O positive 12/16/2019 12:00 AM Assessment/Plan:     Active Problems: Thrombocytopenia complicating pregnancy ([facility] [address] (2/8/2022)      35 weeks gestation of pregnancy (2/8/2022)         Plan: Likely ITP vs TTP. Transfer center called by ED physician Dr. Nadeen Barr, who spoke with Dr. Tasia Ramos who accepted patient for transfer to [facility] for further eval/management secondary to resource availability at sending [facility]). Office records printed. I spoke with Dr. Tasia Ramos and provided more background on patient. Transport will be here before L&D staff here able to do NST. [id number] Chanell Royal per him to transport prior to NST. Plan discussed with patient, questions answered.      Signed By:  Carlyn Dillon MD     February 8, 2022

## 2022-02-09 ENCOUNTER — TELEPHONE (OUTPATIENT)
Dept: PHYSICAL THERAPY | Age: 27
End: 2022-02-09

## 2022-02-11 ENCOUNTER — APPOINTMENT (OUTPATIENT)
Dept: PHYSICAL THERAPY | Age: 27
End: 2022-02-11
Payer: COMMERCIAL

## 2022-02-16 ENCOUNTER — APPOINTMENT (OUTPATIENT)
Dept: PHYSICAL THERAPY | Age: 27
End: 2022-02-16
Payer: COMMERCIAL

## 2022-02-25 ENCOUNTER — APPOINTMENT (OUTPATIENT)
Dept: PHYSICAL THERAPY | Age: 27
End: 2022-02-25
Payer: COMMERCIAL
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
## 2022-03-18 PROBLEM — D69.6 THROMBOCYTOPENIA COMPLICATING PREGNANCY (HCC): Status: ACTIVE | Noted: 2022-02-08

## 2022-03-18 PROBLEM — O99.119 THROMBOCYTOPENIA COMPLICATING PREGNANCY (HCC): Status: ACTIVE | Noted: 2022-02-08

## 2022-03-18 PROBLEM — I49.3 FREQUENT PVCS: Status: ACTIVE | Noted: 2018-07-24

## 2022-03-18 PROBLEM — E55.9 VITAMIN D DEFICIENCY: Status: ACTIVE | Noted: 2018-07-24

## 2022-03-19 PROBLEM — Z3A.35 35 WEEKS GESTATION OF PREGNANCY: Status: ACTIVE | Noted: 2022-02-08

## 2022-03-22 NOTE — PROGRESS NOTES
[facility]  [address], [address] [facility]  Ph [phone]  Fx [phone]    Physical Therapy Discharge Summary    Patient name: Nona Gilman Start of Care: 10/18/2021   Referral Rossy Sun MD : 1995   Medical/Treatment Diagnosis: Pelvic floor dysfunction [M62.89] Onset Date:at about 11 weeks ago   Prior Hospitalization: see medical history Provider#: [id number]   Medications: Verified on Patient Summary List     Comorbidities: PMHx/Surgical Hx: cardiac ablation (PVCs), pregnancy/vaginal delivery 2020; current pregnancy   Prior Level of Function: active lifestyle, not functionally limited, no pain with intercourse            Visits from Start of Care: 14    Missed Visits: 0    Reporting Period : 10/18/2021 to 2022    Goals/Measure of Progress:  Short Term Goals:   Patient will report compliance with PSD HEP 3x/day to aid in rehabilitation program.  Status at IE: Pt was given and pt demonstrated and reported and understanding.   10/22/21: MET, patient reports compliance                  Patient will increase lumbar flexion ROM to greater than 6 inches from fibular head to aid in completion of ADLs  Status at IE: lacking lumbar flexion , fingertips approx 4 inches from fibular head (approx 10 inches from the ground).   Current:  2.5 inches proximal to fibular head.  (21)  Current:  10.5 inches fingertips to floor.  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   Status at IE:Pt reports the ability to walk for no more than 0-2 minutes without increased pain, reports 30 minute walk/stand increases to approx 7/10 pain.   2021 worst pain 6-7/10 by end of day with with lifting son   Worst pain 5/10 anytime she picks up her son (11/12/21)  12/13/2021 pain about the same as previos report, 5-6/10 with lifting and walking however has been able to walk and exercise more \"without getting flared up\"; PROGRESSING TOWARDS GOAL  1/7/22: Alicia Obando is able to stand/walk for 30 minutes with c/o 4-5/10 pain       Patient will report 50% decrease in pain complaints demonstrated by improved bed mobility (rolling over in bed) no more than 2/10 to improve quality of sleep. Status at [facility]: Patient reports 4/10 pain rolling over in bed every night.   Current:  Patient reports pain as 6/10 with rolling over bed and getting out of bed (11/12/21)  12/1/2021 Pt reports decreased LBP for several weeks but reports sharp 10/10 pain last night rolling over, spouse had to help her roll, then pain subsided  12/13/2021 progressing slowly towards goal, pt has had minimal 2/10 LBP for the last few weeks; primarily complaint pubic pain with lifting, walking and bed mobility, highest pain 6/10; PROGRESSING  12/27/2021 5/10 worst pain; no LBP and \"tolerable pubic pain\" generally 2-3/10  1/7/22: continues to report worst pain at 7-8/10 pain  1/14/22  Patient reports decrease in pain to 4/10 1/14/2022 Progressing  2/4/2022 Patient reports pain as high as 5/10 in pubic symphysis.           Patient will be able to lift 30# from floor with proper mechanics and no report of pain  Status at [facility]: Patient unable to perform 25# carseat lifts and in/out of car without report of pain 5/10.   Current:  Pt tolerated lifting 10# from her knee height to her waist with no increase in pain.  (10/29/21)  Current: Pt tolerated lifting 10# from 10 inches (about height of her son when in standing) but 15# increase her pain in lower abdomen 3/10.  12/13/2021 pt tolerating 25# lift with no increase in LBP however lifting son causing 5/10 pain; PROGRESSING TOWARDS GOAL  1/7/22: patient able to lift 23# lb son with c/o \"uncomfortable\" PROGRESSING  1/21/22 Patient is able to lift 10#, 15# and 20# without increased pain Progressing  2/4/2022  Patient is able to lift 10#, 15# and 20# without increased pain Progressing     Patient will demonstrate independence with management tools and exercise program that are beneficial for current condition in order to feel comfortable with Pelvic floor PT D/C and not fear exacerbation of current condition or symptoms returning. Status at [facility] : patient fearful of return to exercise and unaware of what activities to avoid to avoid exacerbation of current condition  12/13/2021 pt compliant with basic HEP and swiss ball progressions, reports she feels PT once/week is \"keeping me functional and in the last two weeks, it hasn't gotten any worse which I think is a really good thing\"; progressing towards goal  1/7/22: PROGRESSING, patient is compliant with HEP and reports that therapy is helping her to maintain her current QOL   2/4/2022 patient is compliant with HEP and reports that therapy is helping her to maintain her current QOL       Assessment/ Summary of Care: Patient is a 27year old female who was treated for symphysis pubis dysfunction. Patient was progressing with therapy . She was placed on a 30 day hold and has not been cleared by her physician to return to therapy. She will be discharged at this time.     RECOMMENDATIONS:  [x]Discontinue therapy: [x]Patient has reached or is progressing toward set goals      []Patient is non-compliant or has abdicated      []Due to lack of appreciable progress towards set goals    Manuel Florez, PT 3/22/2022 12:16 PM

## 2023-12-08 ENCOUNTER — HOSPITAL ENCOUNTER (OUTPATIENT)
Facility: HOSPITAL | Age: 28
Setting detail: RECURRING SERIES
Discharge: HOME | End: 2023-12-11
Payer: COMMERCIAL

## 2023-12-08 PROCEDURE — 97162 PT EVAL MOD COMPLEX 30 MIN: CPT

## 2023-12-08 NOTE — PROGRESS NOTES
[facility]  [address], [address] [facility]  Ph [phone]  Fx [phone]    Plan of Care/ Statement of Necessity for Physical Therapy Services    Patient name: Misael Dailey Start of Care: 2023   Referral source: Jamee Harrell MD : 1995    Medical Diagnosis: Other symptoms and signs involving the genitourinary system [R39.89]   Onset Date:23    Treatment Diagnosis: Other symptoms and signs involving the genitourinary system [R39.89]   Prior Hospitalization: see medical history Provider#: [id number]   Medications: Verified on Patient summary List    Comorbidities: cardiac ablation (PVCs), pregnancy/vaginal deliveries 2020 and ; 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 Otherwise, typically is active / running/ exercises           The Plan of Care and following information is based on the information from the initial evaluation. Assessment/ key information: Patient is a 25year old female presenting to Physical Therapy with c/o left pelvis pain that began insidiously in 2023, which is limiting her ability to sit, exercise, housework >30 min, walk >30 min, and have deep vaginal penetration. She also reports occasional stress urinary incontinence. Pt has been seen in orthopedic PT since May, and while her symptoms have improved, they have reached a plateau; pt is here to rule in or out internal pelvic involvement as she is a mother of two children, with traumatic vaginal deliveries. Imaging is WNL of the hip. Pt demonstrates poor joint mobility through her SIJ, sacrum, and coccyx.  She presents with fair pelvic floor strength; however, has poor ability to relax after a few reps and only engages the left side of her pelvic floor with trace contraction on the right, has posterior stiffness (especially 4-6 o clock) at the
reports bladder continence 75% of the time with cough/sneeze/laugh & walking to the toilet. Eval: Pt reports MICHAEL 1x/month     Pt demonstrates PFMC 4/5 & ability to maintain contraction for 10 sec, 10 reps to improve strength to decrease reported symptoms. Eval: PERF (Performance/Endurance/Repetitions//Flicks): 1/8/4//8; Breath holding and adductor substitution noted; only engages left side of pelvic floor    Pt will have 5/5 bilateral LE strength to allow for pain free  and exercise. 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 Eval: FOTO 21, a little bit of difficulty  FOTO score=established functional score where 100=no disability    Pt demonstrates independence with management tools & exercise program that are beneficial for current condition in order to feel comfortable with Pelvic floor PT D/C & not fear.   Eval: pt fearful of return to exercise & unaware of what activities to avoid to avoid exacerbation of current condition    PLAN  []  Upgrade activities as tolerated     [x]  Continue plan of care  []  Update interventions per flow sheet       []  Discharge due to:_  []  Other:_      Fidelia Kee, PT 12/8/2023  7:31 AM

## 2023-12-28 ENCOUNTER — TELEPHONE (OUTPATIENT)
Facility: HOSPITAL | Age: 28
End: 2023-12-28

## 2023-12-29 ENCOUNTER — APPOINTMENT (OUTPATIENT)
Facility: HOSPITAL | Age: 28
End: 2023-12-29
Payer: COMMERCIAL

## 2024-01-05 ENCOUNTER — HOSPITAL ENCOUNTER (OUTPATIENT)
Facility: HOSPITAL | Age: 29
Setting detail: RECURRING SERIES
Discharge: HOME | End: 2024-01-08
Payer: COMMERCIAL

## 2024-01-05 PROCEDURE — 97535 SELF CARE MNGMENT TRAINING: CPT

## 2024-01-05 PROCEDURE — 97110 THERAPEUTIC EXERCISES: CPT

## 2024-01-05 PROCEDURE — 97112 NEUROMUSCULAR REEDUCATION: CPT

## 2024-01-05 NOTE — PROGRESS NOTES
[facility]  [address] [place] [address]  Ph [phone]  Fx [phone]    Physical Therapy Progress Note  Patient name: Haley Paz Start of Care: 2023   Referral source: Kishore Wolfe MD : 1995               Medical Diagnosis: Other symptoms and signs involving the genitourinary system [R39.89]    Onset Date:23               Treatment Diagnosis: Other symptoms and signs involving the genitourinary system [R39.89]   Prior Hospitalization: see medical history Provider#: [id number]   Medications: Verified on Patient summary List    Comorbidities: cardiac ablation (PVCs), pregnancy/vaginal deliveries 2020 and ; 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 Otherwise, typically is active / running/ exercises     Visits from Start of Care: 2    Missed Visits: 1    Updated Goals/Measure of Progress: To be achieved in 10 treatments:  Short term goals: To be achieved in 6 treatments::  Pt will be independent and compliant with HEP to maximize benefits of PT intervention and improve quality of life.  Eval: Need to dispense  Current: initiated HEP. Progressing 24     Pt will be able to use XS dilator and/or pelvic wand without pain to improve ability to participate in vaginal penetration activities, such as OBGYN exam or intercourse with spouse, to improve quality of life  Eval: Pain with deep vaginal penetration; TTP with trigger points and spasms through all left sided levator ani, Superficial Transverse Perineal, and bulbocavernosus  Current: pt educated on pelvic wand usage. PROGRESSING 24     Pt will be able to maintain PFMC for 6 sec, 6 reps with no accessory substitution or breath holding, and with good relaxation, to improve strength to decrease reported symptoms.  Eval: PERF (Performance/Endurance/Repetitions//Flicks): 
1/5/24     Pt demonstrates PFMC 4/5 & ability to maintain contraction for 10 sec, 10 reps to improve strength to decrease reported symptoms.   Eval: PERF (Performance/Endurance/Repetitions//Flicks): 2/5/4//3; Breath holding and adductor substitution noted; only engages left side of pelvic floor  Current: pt provided with [facility] HEP. Not assessed today. 1/5/24     Pt will have 5/5 bilateral LE strength to allow for pain free  and exercise.  Eval: Strength    L(0-5) R (0-5) N/T   Hip Flexion (L1,2) 4+ 5 []    Knee Extension (L3,4) 4+ 5 []    Ankle Dorsiflexion (L4) 5 5 []    Great Toe Extension (L5) 5 5 []    Ankle Plantarflexion (S1) 5 5 []    Knee Flexion (S1,2) 4+ 5 []    Hip IR 4 5 []    Hip ER 4 5 []    Hip Abduction 4 5 []    Current: progressing 1/5/24    L(0-5) R (0-5) N/T   Hip Flexion (L1,2) 4+ 5 []    Knee Extension (L3,4) 5 5 []    Ankle Dorsiflexion (L4) 5 5 []    Great Toe Extension (L5) 5 5 []    Ankle Plantarflexion (S1) 5 5 []    Knee Flexion (S1,2) 4+ 5 []    Hip IR 4 5 []    Hip ER 4 5 []    Hip Abduction 4+ 5 []         Pt will be able to sit and walk for >60 min to improve functional community mobility, ability to drive, 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 Sitting, time varies. \"Shifting all the time when I'm sitting\". PROGRESSING 1/5/24     Pt demonstrates improvement of current complaints evidenced by an improvement in FOTO score to indicate no difficulty in house hold chores.  Eval: FOTO 21, a little bit of difficulty  Current: will assess at fifth visit  FOTO score=established functional score where 100=no disability     Pt demonstrates independence with management tools & exercise program that are beneficial for current condition in order to feel comfortable with Pelvic floor PT D/C & not fear.  Eval: pt fearful of return to exercise & unaware of what activities to avoid to avoid exacerbation

## 2024-01-12 ENCOUNTER — TELEPHONE (OUTPATIENT)
Facility: HOSPITAL | Age: 29
End: 2024-01-12

## 2024-01-12 ENCOUNTER — HOSPITAL ENCOUNTER (OUTPATIENT)
Facility: HOSPITAL | Age: 29
Setting detail: RECURRING SERIES
Discharge: HOME | End: 2024-01-15
Payer: COMMERCIAL

## 2024-01-12 PROCEDURE — 97112 NEUROMUSCULAR REEDUCATION: CPT

## 2024-01-12 PROCEDURE — 97530 THERAPEUTIC ACTIVITIES: CPT

## 2024-01-12 PROCEDURE — 97110 THERAPEUTIC EXERCISES: CPT

## 2024-01-12 NOTE — PROGRESS NOTES
PHYSICAL / OCCUPATIONAL THERAPY - DAILY TREATMENT NOTE     Patient Name: Haley Paz    Date: 2024    : 1995  Insurance: Payor: HERMELINDA / Plan: [employer] / Product Type: *No Product type* /      Patient  verified Yes     Visit #   Current / Total 3 12   Time   In / Out 1:13 1:52   Pain   In / Out 1 1   Subjective Functional Status/Changes: Patient reports hip pain continues to improve; states it is more intermittent than constant now. Patient also reports compliance with HEP.   Changes to:  Allergies, Med Hx, Sx Hx?   Yes - saw doctor yesterday for AVN of right hip- instructed patient low impact activities only- no running and jumping indefinitely     TREATMENT AREA =  Other symptoms and signs involving the genitourinary system [R39.89]    OBJECTIVE    Therapeutic Procedures:  Tx Min Billable or 1:1 Min (if diff from Tx Min) Procedure, Rationale, Specifics   15  43427 Therapeutic Exercise (timed):  increase ROM, strength, coordination, balance, and proprioception to improve patient's ability to progress to PLOF and address remaining functional goals. (see flow sheet as applicable)    Details if applicable:       15  03095 Neuromuscular Re-Education (timed):  improve balance, coordination, kinesthetic sense, posture, core stability and proprioception to improve patient's ability to develop conscious control of individual muscles and awareness of position of extremities in order to progress to PLOF and address remaining functional goals. (see flow sheet as applicable)    Details if applicable:     9  91149 Therapeutic Activity (timed):  use of dynamic activities replicating functional movements to increase ROM, strength, coordination, balance, and proprioception in order to improve patient's ability to progress to PLOF and address remaining functional goals.  (see flow sheet as applicable)     Details if applicable:           Details if applicable:            Details if applicable:

## 2024-01-17 ENCOUNTER — APPOINTMENT (OUTPATIENT)
Facility: HOSPITAL | Age: 29
End: 2024-01-17
Payer: COMMERCIAL

## 2024-01-18 ENCOUNTER — HOSPITAL ENCOUNTER (OUTPATIENT)
Facility: HOSPITAL | Age: 29
Setting detail: RECURRING SERIES
Discharge: HOME | End: 2024-01-21
Payer: COMMERCIAL

## 2024-01-18 PROCEDURE — 97112 NEUROMUSCULAR REEDUCATION: CPT

## 2024-01-18 PROCEDURE — 97530 THERAPEUTIC ACTIVITIES: CPT

## 2024-01-18 NOTE — PROGRESS NOTES
PHYSICAL / OCCUPATIONAL THERAPY - DAILY TREATMENT NOTE     Patient Name: Haley Paz    Date: 2024    : 1995  Insurance: Payor: HERMELINDA / Plan: [employer] / Product Type: *No Product type* /      Patient  verified Yes     Visit #   Current / Total 4 12   Time   In / Out 5:15 5:50   Pain   In / Out 1 1   Subjective Functional Status/Changes: Patient reports compliance with HEP and decreased hip pain   Changes to:  Allergies, Med Hx, Sx Hx?   no       TREATMENT AREA =  Other symptoms and signs involving the genitourinary system [R39.89]    OBJECTIVE      Therapeutic Procedures:  Tx Min Billable or 1:1 Min (if diff from Tx Min) Procedure, Rationale, Specifics   10  96044 Therapeutic Exercise (timed):  increase ROM, strength, coordination, balance, and proprioception to improve patient's ability to progress to PLOF and address remaining functional goals. (see flow sheet as applicable)    Details if applicable:       15  11487 Neuromuscular Re-Education (timed):  improve balance, coordination, kinesthetic sense, posture, core stability and proprioception to improve patient's ability to develop conscious control of individual muscles and awareness of position of extremities in order to progress to PLOF and address remaining functional goals. (see flow sheet as applicable)    Details if applicable:     10  16071 Therapeutic Activity (timed):  use of dynamic activities replicating functional movements to increase ROM, strength, coordination, balance, and proprioception in order to improve patient's ability to progress to PLOF and address remaining functional goals.  (see flow sheet as applicable)     Details if applicable:           Details if applicable:            Details if applicable:     35  [facility] Totals Reminder: bill using total billable min of TIMED therapeutic procedures (example: do not include dry needle or estim unattended, both untimed codes, in totals to left)  8-22 min = 1

## 2024-01-19 ENCOUNTER — APPOINTMENT (OUTPATIENT)
Facility: HOSPITAL | Age: 29
End: 2024-01-19
Payer: COMMERCIAL

## 2024-01-22 ENCOUNTER — TELEPHONE (OUTPATIENT)
Facility: HOSPITAL | Age: 29
End: 2024-01-22

## 2024-01-23 ENCOUNTER — APPOINTMENT (OUTPATIENT)
Facility: HOSPITAL | Age: 29
End: 2024-01-23
Payer: COMMERCIAL

## 2024-01-24 ENCOUNTER — APPOINTMENT (OUTPATIENT)
Facility: HOSPITAL | Age: 29
End: 2024-01-24
Payer: COMMERCIAL

## 2024-02-02 ENCOUNTER — HOSPITAL ENCOUNTER (OUTPATIENT)
Facility: HOSPITAL | Age: 29
Setting detail: RECURRING SERIES
Discharge: HOME | End: 2024-02-05
Payer: COMMERCIAL

## 2024-02-02 PROCEDURE — 97112 NEUROMUSCULAR REEDUCATION: CPT

## 2024-02-02 PROCEDURE — 97530 THERAPEUTIC ACTIVITIES: CPT

## 2024-02-02 PROCEDURE — 97110 THERAPEUTIC EXERCISES: CPT

## 2024-02-02 NOTE — PROGRESS NOTES
[facility] [place] [address]  Ph [phone]  Fx [phone]    Physical Therapy Progress Note  Patient name: Haley Paz Start of Care: 2023   Referral source: Kishore Wolfe MD : 1995               Medical Diagnosis: Other symptoms and signs involving the genitourinary system [R39.89]    Onset Date:23               Treatment Diagnosis: Other symptoms and signs involving the genitourinary system [R39.89]   Prior Hospitalization: see medical history Provider#: [id number]   Medications: Verified on Patient summary List    Comorbidities: cardiac ablation (PVCs), pregnancy/vaginal deliveries 2020 and ; 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 Otherwise, typically is active / running/ exercises      Visits from Start of Care: 5    Missed Visits: 1    Updated Goals/Measure of Progress:    Short term goals: To be achieved in 6 treatments::  Pt will be independent and compliant with HEP to maximize benefits of PT intervention and improve quality of life.  Eval: Need to dispense  Current: initiated HEP. Progressing 24: patient reports compliance PROGRESSING  PN 2: patient reports compliance with HEP PROGRESSING     Pt will be able to use XS dilator and/or pelvic wand without pain to improve ability to participate in vaginal penetration activities, such as OBGYN exam or intercourse with spouse, to improve quality of life  Eval: Pain with deep vaginal penetration; TTP with trigger points and spasms through all left sided levator ani, Superficial Transverse Perineal, and bulbocavernosus  Current: pt educated on pelvic wand usage. PROGRESSING 24: patient reports has been using pelvic wand PROGRESSING  : patient reports decrease in tenderness with wand usage PROGRESSING  PN 2/2: wand has still been

## 2024-02-02 NOTE — PROGRESS NOTES
PHYSICAL / OCCUPATIONAL THERAPY - DAILY TREATMENT NOTE     Patient Name: Haley Paz    Date: 2024    : 1995  Insurance: Payor: [employer] / Plan: [facility] / Product Type: *No Product type* /      Patient  verified Yes     Visit #   Current / Total 5 12   Time   In / Out 1:52 2:30   Pain   In / Out 0 0   Subjective Functional Status/Changes: Patient reports decreased pain recently and no new complaints   Changes to:  Allergies, Med Hx, Sx Hx?   no       TREATMENT AREA =  Other symptoms and signs involving the genitourinary system [R39.89]    OBJECTIVE    Therapeutic Procedures:  Tx Min Billable or 1:1 Min (if diff from Tx Min) Procedure, Rationale, Specifics   10  64916 Therapeutic Exercise (timed):  increase ROM, strength, coordination, balance, and proprioception to improve patient's ability to progress to PLOF and address remaining functional goals. (see flow sheet as applicable)    Details if applicable:       15  53972 Neuromuscular Re-Education (timed):  improve balance, coordination, kinesthetic sense, posture, core stability and proprioception to improve patient's ability to develop conscious control of individual muscles and awareness of position of extremities in order to progress to PLOF and address remaining functional goals. (see flow sheet as applicable)    Details if applicable:     13  03438 Therapeutic Activity (timed):  use of dynamic activities replicating functional movements to increase ROM, strength, coordination, balance, and proprioception in order to improve patient's ability to progress to PLOF and address remaining functional goals.  (see flow sheet as applicable)     Details if applicable:           Details if applicable:            Details if applicable:     38  [facility] Totals Reminder: bill using total billable min of TIMED therapeutic procedures (example: do not include dry needle or estim unattended, both untimed codes, in totals to left)  8-22 min = 1

## 2024-02-09 ENCOUNTER — HOSPITAL ENCOUNTER (OUTPATIENT)
Facility: HOSPITAL | Age: 29
Setting detail: RECURRING SERIES
Discharge: HOME | End: 2024-02-12
Payer: COMMERCIAL

## 2024-02-09 PROCEDURE — 97530 THERAPEUTIC ACTIVITIES: CPT

## 2024-02-09 PROCEDURE — 97110 THERAPEUTIC EXERCISES: CPT

## 2024-02-09 PROCEDURE — 97112 NEUROMUSCULAR REEDUCATION: CPT

## 2024-02-09 NOTE — PROGRESS NOTES
through all left sided levator ani, Superficial Transverse Perineal, and bulbocavernosus  Current: pt educated on pelvic wand usage. PROGRESSING 1/5/24 1/12: patient reports has been using pelvic wand PROGRESSING  1/18: patient reports decrease in tenderness with wand usage PROGRESSING  PN 2/2: wand has still been helpful PROGRESSING     Pt will be able to maintain PFMC for 6 sec, 6 reps with no accessory substitution or breath holding, and with good relaxation, to improve strength to decrease reported symptoms.  Eval: PERF (Performance/Endurance/Repetitions//Flicks): 2/5/4//3; Breath holding and adductor substitution noted; only engages left side of pelvic floor  Current: pt provided with PFMC HEP. Not assessed today. 1/5/24  PN 2/2: 3/5/7/8 PROGRESSING     Pt will report pain that is no worse than 3/10 throughout the week to improve quality of life  Eval: 7/10 at worst  Current: 2/10 pain at worst MET 1/5/24     Pt will have full lumbar ROM without pain to allow for improved ability to complete housework and ADLs  Eval:   ROM % AROM Comments:pain, area   Forward flexion 40-60 WNL     Extension 20-30 WNL     SideBend right 20-30 25% loss P!   SideBend left 20-30 25% loss P!   Sideglide right 25% loss     Sideglide left 25% loss      Current: nearly met 1/5/24  ROM % AROM Comments:pain, area   Forward flexion 40-60 WNL     Extension 20-30 WNL     SideBend right 20-30 WNL P!   SideBend left 20-30 WNL     Sideglide right WNL     Sideglide left WNL        PN 2/2: WNL     Long term goals: To be achieved in 12 treatments::  Pt reports bladder continence 75% of the time with cough/sneeze/laugh & walking to the toilet.   Eval: Pt reports MICHAEL 1x/month   Current: pt denies UI in the past month PROGRESSING 1/5/24  PN 2/2: two episodes of leakage in the past 2 weeks, both when coughing with full bladder PROGRESSING  2/9: no recent episodes of leakage PROGRESSING     Pt demonstrates PFMC 4/5 & ability to maintain contraction

## 2024-02-15 ENCOUNTER — TELEPHONE (OUTPATIENT)
Facility: HOSPITAL | Age: 29
End: 2024-02-15

## 2024-02-16 ENCOUNTER — HOSPITAL ENCOUNTER (OUTPATIENT)
Facility: HOSPITAL | Age: 29
Setting detail: RECURRING SERIES
Discharge: HOME | End: 2024-02-19
Payer: COMMERCIAL

## 2024-02-16 PROCEDURE — 97112 NEUROMUSCULAR REEDUCATION: CPT

## 2024-02-16 PROCEDURE — 97110 THERAPEUTIC EXERCISES: CPT

## 2024-02-16 PROCEDURE — 97535 SELF CARE MNGMENT TRAINING: CPT

## 2024-02-16 NOTE — PROGRESS NOTES
SideBend right 20-30 25% loss P!   SideBend left 20-30 25% loss P!   Sideglide right 25% loss     Sideglide left 25% loss      Current: nearly met 1/5/24  ROM % AROM Comments:pain, area   Forward flexion 40-60 WNL     Extension 20-30 WNL     SideBend right 20-30 WNL P!   SideBend left 20-30 WNL     Sideglide right WNL     Sideglide left WNL        PN 2/2: WNL     Long term goals: To be achieved in 12 treatments::  Pt reports bladder continence 75% of the time with cough/sneeze/laugh & walking to the toilet.   Eval: Pt reports MICHAEL 1x/month   Current: pt denies UI in the past month PROGRESSING 1/5/24  PN 2/2: two episodes of leakage in the past 2 weeks, both when coughing with full bladder PROGRESSING  2/9: no recent episodes of leakage PROGRESSING     Pt demonstrates PFMC 4/5 & ability to maintain contraction for 10 sec, 10 reps to improve strength to decrease reported symptoms.   Eval: PERF (Performance/Endurance/Repetitions//Flicks): 2/5/4//3; Breath holding and adductor substitution noted; only engages left side of pelvic floor  Current: pt provided with PFMC HEP. Not assessed today. 1/5/24  PN 2/2: 3/5/7/8 PROGRESSING      Pt will have 5/5 bilateral LE strength to allow for pain free  and exercise.  Eval: Strength    L(0-5) R (0-5) N/T   Hip Flexion (L1,2) 4+ 5 []    Knee Extension (L3,4) 4+ 5 []    Ankle Dorsiflexion (L4) 5 5 []    Great Toe Extension (L5) 5 5 []    Ankle Plantarflexion (S1) 5 5 []    Knee Flexion (S1,2) 4+ 5 []    Hip IR 4 5 []    Hip ER 4 5 []    Hip Abduction 4 5 []    Current: progressing 1/5/24    L(0-5) R (0-5) N/T   Hip Flexion (L1,2) 4+ 5 []    Knee Extension (L3,4) 5 5 []    Ankle Dorsiflexion (L4) 5 5 []    Great Toe Extension (L5) 5 5 []    Ankle Plantarflexion (S1) 5 5 []    Knee Flexion (S1,2) 4+ 5 []    Hip IR 4 5 []    Hip ER 4 5 []    Hip Abduction 4+ 5 []          Pt will be able to sit and walk for >60 min to improve functional community mobility, ability to drive,

## 2024-03-01 ENCOUNTER — HOSPITAL ENCOUNTER (OUTPATIENT)
Facility: HOSPITAL | Age: 29
Setting detail: RECURRING SERIES
Discharge: HOME | End: 2024-03-04
Payer: COMMERCIAL

## 2024-03-01 PROCEDURE — 97112 NEUROMUSCULAR REEDUCATION: CPT

## 2024-03-01 PROCEDURE — 97110 THERAPEUTIC EXERCISES: CPT

## 2024-03-01 PROCEDURE — 97535 SELF CARE MNGMENT TRAINING: CPT

## 2024-03-01 NOTE — PROGRESS NOTES
Physical Therapy Discharge Instructions    [facility]  [address] [place] [address]  Ph [phone]  Fx [phone]      Patient: Haley Paz  : 1995      Continue Home Exercise Program 1 times per day for 4-6 weeks, then decrease to 4-5 times per week      Follow up with MD:     [] Upon completion of therapy     [x] As needed      Recommendations:     [x]   Return to activity with home program    []   Return to activity with the following modifications:       []Post Rehab Program    []Join Independent aquatic program     []Return to/join local gym          Yanira Ascencio PTA 3/1/2024 1:31 PM    
be able to maintain PFMC for 6 sec, 6 reps with no accessory substitution or breath holding, and with good relaxation, to improve strength to decrease reported symptoms.  Eval: PERF (Performance/Endurance/Repetitions//Flicks): 2/5/4//3; Breath holding and adductor substitution noted; only engages left side of pelvic floor  Current: pt provided with [facility] HEP. Not assessed today. 1/5/24  PN 2/2: 3/5/7/8 PROGRESSING  Current: pt to increase long holds x8 seconds as part of HEP PROGRESSING 2/16/24  Current: pt reports compliance with [facility] HEP. Defers internal today. 3/1/24     Pt will report pain that is no worse than 3/10 throughout the week to improve quality of life  Eval: 7/10 at worst  Current: 2/10 pain at worst MET 1/5/24     Pt will have full lumbar ROM without pain to allow for improved ability to complete housework and ADLs  Eval:   ROM % AROM Comments:pain, area   Forward flexion 40-60 WNL     Extension 20-30 WNL     SideBend right 20-30 25% loss P!   SideBend left 20-30 25% loss P!   Sideglide right 25% loss     Sideglide left 25% loss      Current: nearly met 1/5/24  ROM % AROM Comments:pain, area   Forward flexion 40-60 WNL     Extension 20-30 WNL     SideBend right 20-30 WNL P!   SideBend left 20-30 WNL     Sideglide right WNL     Sideglide left WNL        PN 2/2: WNL  Current: MET 3/1/24  ROM % AROM Comments:pain, area   Forward flexion 40-60 100%     Extension 20-30 100%     SideBend right 20-30 100%    SideBend left 20-30 100%     Sideglide right 100%     Sideglide left 100%          Long term goals: To be achieved in 12 treatments::  Pt reports bladder continence 75% of the time with cough/sneeze/laugh & walking to the toilet.   Eval: Pt reports MICHAEL 1x/month   Current: pt denies UI in the past month PROGRESSING 1/5/24  PN 2/2: two episodes of leakage in the past 2 weeks, both when coughing with full bladder PROGRESSING  2/9: no recent episodes of leakage PROGRESSING  Current: 100% continence reported

## 2024-03-15 ENCOUNTER — APPOINTMENT (OUTPATIENT)
Facility: HOSPITAL | Age: 29
End: 2024-03-15
Payer: COMMERCIAL